# Patient Record
Sex: FEMALE | Race: AMERICAN INDIAN OR ALASKA NATIVE | Employment: UNEMPLOYED | URBAN - METROPOLITAN AREA
[De-identification: names, ages, dates, MRNs, and addresses within clinical notes are randomized per-mention and may not be internally consistent; named-entity substitution may affect disease eponyms.]

---

## 2017-05-23 ENCOUNTER — TRANSFERRED RECORDS (OUTPATIENT)
Dept: HEALTH INFORMATION MANAGEMENT | Facility: CLINIC | Age: 62
End: 2017-05-23

## 2017-05-23 ENCOUNTER — MEDICAL CORRESPONDENCE (OUTPATIENT)
Dept: HEALTH INFORMATION MANAGEMENT | Facility: CLINIC | Age: 62
End: 2017-05-23

## 2017-05-23 LAB
HBA1C MFR BLD: 5.9 % (ref 0–5.7)
HEP C HIM: NORMAL
PHQ9 SCORE: 3

## 2017-05-24 ENCOUNTER — TRANSFERRED RECORDS (OUTPATIENT)
Dept: HEALTH INFORMATION MANAGEMENT | Facility: CLINIC | Age: 62
End: 2017-05-24

## 2017-06-01 ENCOUNTER — TRANSFERRED RECORDS (OUTPATIENT)
Dept: HEALTH INFORMATION MANAGEMENT | Facility: CLINIC | Age: 62
End: 2017-06-01

## 2017-06-01 LAB — PAP SMEAR - HIM PATIENT REPORTED: NEGATIVE

## 2017-06-06 ENCOUNTER — TRANSFERRED RECORDS (OUTPATIENT)
Dept: HEALTH INFORMATION MANAGEMENT | Facility: CLINIC | Age: 62
End: 2017-06-06

## 2017-06-06 LAB
ALT SERPL-CCNC: 44 U/L (ref 6–29)
AST SERPL-CCNC: 21 U/L (ref 10–35)
CREAT SERPL-MCNC: 0.7 MG/DL (ref 0.5–0.99)
GFR SERPL CREATININE-BSD FRML MDRD: 93 ML/MIN/1.73M2
GLUCOSE SERPL-MCNC: 90 MG/DL (ref 65–99)
POTASSIUM SERPL-SCNC: 4.4 MMOL/L (ref 3.5–5.3)

## 2017-06-07 ENCOUNTER — TRANSFERRED RECORDS (OUTPATIENT)
Dept: HEALTH INFORMATION MANAGEMENT | Facility: CLINIC | Age: 62
End: 2017-06-07

## 2017-06-07 ENCOUNTER — PRE VISIT (OUTPATIENT)
Dept: RHEUMATOLOGY | Facility: CLINIC | Age: 62
End: 2017-06-07

## 2017-06-07 LAB — HBA1C MFR BLD: 5.8 % (ref 0–5.7)

## 2017-06-08 RX ORDER — ACETAMINOPHEN 500 MG
500-1000 TABLET ORAL EVERY 6 HOURS PRN
COMMUNITY
End: 2017-08-31

## 2017-06-08 RX ORDER — AZITHROMYCIN 250 MG/1
250 TABLET, FILM COATED ORAL
COMMUNITY
End: 2017-06-08

## 2017-06-08 RX ORDER — METFORMIN HCL 500 MG
500 TABLET, EXTENDED RELEASE 24 HR ORAL 2 TIMES DAILY WITH MEALS
COMMUNITY

## 2017-06-08 RX ORDER — IBUPROFEN 800 MG/1
800 TABLET, FILM COATED ORAL EVERY 8 HOURS PRN
COMMUNITY

## 2017-06-08 RX ORDER — GABAPENTIN 100 MG/1
100 CAPSULE ORAL 2 TIMES DAILY PRN
COMMUNITY

## 2017-06-08 RX ORDER — PREDNISONE 5 MG/1
10 TABLET ORAL SEE ADMIN INSTRUCTIONS
COMMUNITY
End: 2017-06-08

## 2017-06-08 NOTE — TELEPHONE ENCOUNTER
PREVISIT INFORMATION                                                    Lesly Campbell scheduled for future visit at [facility] specialty clinics.    Patient is scheduled to see Dr. Meza on 6/14/2017  Reason for visit: RA  Referring provider Janessa Farias   Has patient seen previous specialist? Yes.  [facility]  Over ten years ago cant remember providers name    Medical Records:  Patient signed to have records sent to us     REVIEW                                                      New patient packet mailed to patient: N/A  Medication reconciliation complete: Yes      Current Outpatient Prescriptions   Medication Sig Dispense Refill     acetaminophen (TYLENOL) 500 MG tablet Take 500-1,000 mg by mouth every 6 hours as needed for mild pain       ibuprofen (ADVIL/MOTRIN) 800 MG tablet Take 800 mg by mouth every 8 hours as needed for moderate pain       sodium chloride (OCEAN) 0.65 % nasal spray Spray 2 sprays in nostril daily as needed for congestion       metFORMIN (GLUCOPHAGE-XR) 500 MG 24 hr tablet Take 500 mg by mouth daily (with breakfast)       gabapentin (NEURONTIN) 100 MG capsule Take 100 mg by mouth 2 times daily as needed       ONDANSETRON PO Take 8 mg by mouth every 8 hours as needed for nausea       OMEPRAZOLE PO Take 20 mg by mouth every morning       Methotrexate Sodium (METHOTREXATE PO) Take 2.5 mg by mouth once a week       FOLIC ACID PO Take 1 mg by mouth daily         Allergies: Sulfa drugs        PLAN/FOLLOW-UP NEEDED                                                      Previsit review complete.  Patient will see provider at future scheduled appointment.     Patient Reminders Given:  Please, make sure you bring an updated list of your medications.   If you are having a procedure, please, present 15 minutes early.  If you need to cancel or reschedule,please call [phone].    Sancho Jackman

## 2017-06-08 NOTE — TELEPHONE ENCOUNTER
Patient plans to hold new methotrexate medication until evaluation with Dr. Meza.    Lizet Adame, RYANN, RN, PHN  Rheumatology Care Coordinator  [facility]

## 2017-06-14 ENCOUNTER — OFFICE VISIT (OUTPATIENT)
Dept: RHEUMATOLOGY | Facility: CLINIC | Age: 62
End: 2017-06-14
Payer: MEDICAID

## 2017-06-14 ENCOUNTER — RADIANT APPOINTMENT (OUTPATIENT)
Dept: GENERAL RADIOLOGY | Facility: CLINIC | Age: 62
End: 2017-06-14
Attending: STUDENT IN AN ORGANIZED HEALTH CARE EDUCATION/TRAINING PROGRAM
Payer: MEDICAID

## 2017-06-14 VITALS
WEIGHT: 156.2 LBS | TEMPERATURE: 98.2 F | DIASTOLIC BLOOD PRESSURE: 74 MMHG | SYSTOLIC BLOOD PRESSURE: 109 MMHG | HEART RATE: 71 BPM

## 2017-06-14 DIAGNOSIS — R53.82 CHRONIC FATIGUE: ICD-10-CM

## 2017-06-14 DIAGNOSIS — M05.79 RHEUMATOID ARTHRITIS INVOLVING MULTIPLE SITES WITH POSITIVE RHEUMATOID FACTOR (H): Primary | ICD-10-CM

## 2017-06-14 DIAGNOSIS — M05.79 RHEUMATOID ARTHRITIS INVOLVING MULTIPLE SITES WITH POSITIVE RHEUMATOID FACTOR (H): ICD-10-CM

## 2017-06-14 LAB
ALBUMIN SERPL-MCNC: 3.6 G/DL (ref 3.4–5)
ALP SERPL-CCNC: 69 U/L (ref 40–150)
ALT SERPL W P-5'-P-CCNC: 63 U/L (ref 0–50)
AST SERPL W P-5'-P-CCNC: 45 U/L (ref 0–45)
BASOPHILS # BLD AUTO: 0.1 10E9/L (ref 0–0.2)
BASOPHILS NFR BLD AUTO: 0.8 %
CRP SERPL-MCNC: 9.7 MG/L (ref 0–8)
DIFFERENTIAL METHOD BLD: NORMAL
EOSINOPHIL # BLD AUTO: 0.4 10E9/L (ref 0–0.7)
EOSINOPHIL NFR BLD AUTO: 5.2 %
ERYTHROCYTE [DISTWIDTH] IN BLOOD BY AUTOMATED COUNT: 14.7 % (ref 10–15)
ERYTHROCYTE [SEDIMENTATION RATE] IN BLOOD BY WESTERGREN METHOD: 29 MM/H (ref 0–30)
HCT VFR BLD AUTO: 40.3 % (ref 35–47)
HGB BLD-MCNC: 13.2 G/DL (ref 11.7–15.7)
LYMPHOCYTES # BLD AUTO: 2 10E9/L (ref 0.8–5.3)
LYMPHOCYTES NFR BLD AUTO: 28.2 %
MCH RBC QN AUTO: 30.2 PG (ref 26.5–33)
MCHC RBC AUTO-ENTMCNC: 32.8 G/DL (ref 31.5–36.5)
MCV RBC AUTO: 92 FL (ref 78–100)
MONOCYTES # BLD AUTO: 0.5 10E9/L (ref 0–1.3)
MONOCYTES NFR BLD AUTO: 6.4 %
NEUTROPHILS # BLD AUTO: 4.2 10E9/L (ref 1.6–8.3)
NEUTROPHILS NFR BLD AUTO: 59.4 %
PLATELET # BLD AUTO: 283 10E9/L (ref 150–450)
RBC # BLD AUTO: 4.37 10E12/L (ref 3.8–5.2)
TSH SERPL DL<=0.005 MIU/L-ACNC: 0.98 MU/L (ref 0.4–4)
WBC # BLD AUTO: 7.1 10E9/L (ref 4–11)

## 2017-06-14 PROCEDURE — 82306 VITAMIN D 25 HYDROXY: CPT | Performed by: STUDENT IN AN ORGANIZED HEALTH CARE EDUCATION/TRAINING PROGRAM

## 2017-06-14 PROCEDURE — 82040 ASSAY OF SERUM ALBUMIN: CPT | Performed by: STUDENT IN AN ORGANIZED HEALTH CARE EDUCATION/TRAINING PROGRAM

## 2017-06-14 PROCEDURE — 73130 X-RAY EXAM OF HAND: CPT | Mod: LT | Performed by: RADIOLOGY

## 2017-06-14 PROCEDURE — 86200 CCP ANTIBODY: CPT | Performed by: STUDENT IN AN ORGANIZED HEALTH CARE EDUCATION/TRAINING PROGRAM

## 2017-06-14 PROCEDURE — 87340 HEPATITIS B SURFACE AG IA: CPT | Performed by: STUDENT IN AN ORGANIZED HEALTH CARE EDUCATION/TRAINING PROGRAM

## 2017-06-14 PROCEDURE — 84460 ALANINE AMINO (ALT) (SGPT): CPT | Performed by: STUDENT IN AN ORGANIZED HEALTH CARE EDUCATION/TRAINING PROGRAM

## 2017-06-14 PROCEDURE — 84443 ASSAY THYROID STIM HORMONE: CPT | Performed by: STUDENT IN AN ORGANIZED HEALTH CARE EDUCATION/TRAINING PROGRAM

## 2017-06-14 PROCEDURE — 86140 C-REACTIVE PROTEIN: CPT | Performed by: STUDENT IN AN ORGANIZED HEALTH CARE EDUCATION/TRAINING PROGRAM

## 2017-06-14 PROCEDURE — 99204 OFFICE O/P NEW MOD 45 MIN: CPT | Performed by: STUDENT IN AN ORGANIZED HEALTH CARE EDUCATION/TRAINING PROGRAM

## 2017-06-14 PROCEDURE — 36415 COLL VENOUS BLD VENIPUNCTURE: CPT | Performed by: STUDENT IN AN ORGANIZED HEALTH CARE EDUCATION/TRAINING PROGRAM

## 2017-06-14 PROCEDURE — 86704 HEP B CORE ANTIBODY TOTAL: CPT | Performed by: STUDENT IN AN ORGANIZED HEALTH CARE EDUCATION/TRAINING PROGRAM

## 2017-06-14 PROCEDURE — 85025 COMPLETE CBC W/AUTO DIFF WBC: CPT | Performed by: STUDENT IN AN ORGANIZED HEALTH CARE EDUCATION/TRAINING PROGRAM

## 2017-06-14 PROCEDURE — 85652 RBC SED RATE AUTOMATED: CPT | Performed by: STUDENT IN AN ORGANIZED HEALTH CARE EDUCATION/TRAINING PROGRAM

## 2017-06-14 PROCEDURE — 86480 TB TEST CELL IMMUN MEASURE: CPT | Performed by: STUDENT IN AN ORGANIZED HEALTH CARE EDUCATION/TRAINING PROGRAM

## 2017-06-14 PROCEDURE — 84450 TRANSFERASE (AST) (SGOT): CPT | Performed by: STUDENT IN AN ORGANIZED HEALTH CARE EDUCATION/TRAINING PROGRAM

## 2017-06-14 PROCEDURE — 84075 ASSAY ALKALINE PHOSPHATASE: CPT | Performed by: STUDENT IN AN ORGANIZED HEALTH CARE EDUCATION/TRAINING PROGRAM

## 2017-06-14 RX ORDER — PREDNISONE 5 MG/1
5 TABLET ORAL DAILY
Qty: 90 TABLET | Refills: 0 | Status: SHIPPED | OUTPATIENT
Start: 2017-06-14 | End: 2017-08-31

## 2017-06-14 RX ORDER — AZITHROMYCIN 250 MG/1
TABLET, FILM COATED ORAL
COMMUNITY
Start: 2017-05-23 | End: 2017-08-31

## 2017-06-14 ASSESSMENT — PAIN SCALES - GENERAL: PAINLEVEL: MODERATE PAIN (4)

## 2017-06-14 NOTE — PROGRESS NOTES
HISTORY OF PRESENT ILLNESS:  Lesly Campbell is a 61-year-old female with a past medical history of rheumatoid arthritis, newly diagnosed diabetes, neuropathy seen in the clinic at consultation request of primary care Janessa Farias for evaluation and treatment of rheumatoid arthritis.  Ms. Campbell mentions that she was diagnosed with rheumatoid arthritis in 2001.  She was followed by Dr. Jimenez in [facility] and later by Dr. Meade at [facility].  She has taken methotrexate 7 tablets in the past.  She did mention about taking self injectables as well as infusions but could not recall the name of her medications.  Due to loss of insurance she could not take these medications and could not follow up with her rheumatologist.  Since 2010, she has not taken her methotrexate.  She follows up with a primary care at [facility] and her pain is managed with Tylenol 1000 mg and ibuprofen 800 mg on as needed basis.  According to her, she was doing fine since 2010, had no flareups but starting in 05/2017 she started complaining more pain in her hands and feet along with morning stiffness.  She also feels more fatigued and tired.  Her primary care did some labs including CBC, liver, kidney function and rheumatoid factor.  Her rheumatoid factor came back high at 54.  SILVINA was negative.  She had mild increase in ALT to 41.  Otherwise, had normal CRP and CBC.  Along with some mild stiffness and pain in her hands she also complains of a significant burning pain in her fingertips.  She was prescribed metformin and gabapentin by her primary care, but has not started taking these medications yet.  She was given a prednisone taper starting with 60 mg and tapering by 10 mg every 2 weeks to off.  She just finished her prednisone taper a few days ago.  Prednisone did help with her joint pains but it did not help with the numbness and tingling.  Plus, she did not like the side effects of prednisone.  She felt anxious and had  sleep disturbances on it.      She also had a recent strep throat and a fever.  She is taking azithromycin and is feeling better now.  Otherwise, she denies any loss of weight, fever, chills or night sweats.  No new skin rash, pleuritic chest pain, sicca symptoms, or Raynaud's.  No history of any arterial or venous blood clot.  She does mention about not having good night's sleep.  She wakes up in the middle of night.  She does snore at night but has never had any formal sleep evaluation done.      PAST MEDICAL HISTORY:  Rheumatoid arthritis, as described in HPI.  She has a history of abnormal LFTs in the past as well.      PAST SURGICAL HISTORY:  None.        FAMILY HISTORY:  She has a history of liver cancer and cirrhosis in her mother.  There is no history of any rheumatoid arthritis or autoimmune disease in the family.      ALLERGIES:  Sulfa drugs.      SOCIAL HISTORY:  She denies any smoking, drug abuse or alcohol abuse.      REVIEW OF SYMPTOMS:   CONSTITUTIONAL:  Denies any fever, chills, night sweats or fatigue.   SKIN:  No skin rash.   EYES:  No dryness or irritation.   ENT:  No recurrent sinus infections.  She just had an upper respiratory infection for which she is taking azithromycin.   RESPIRATORY:  No shortness of breath, dyspnea, cough or hemoptysis.   CARDIOVASCULAR:  No chest pain or palpitations.   GASTROINTESTINAL:  No nausea, vomiting, abdominal pain.   GENITOURINARY:  No dysuria.     NEUROLOGIC:  She does complain of burning sensation in her fingertips along with numbness and tingling.  Some numbness in her toes.   PSYCHIATRIC:  No mood disorder.  She does feel depressed and anxious at some times.   HEMATOLOGIC:  No history of easy bruising or petechia or purpura.  No abnormal bleeding.   ENDOCRINE:  No history of thyroid disease.  Newly diagnosed diabetes.      PHYSICAL EXAMINATION:   VITAL SIGNS:  Blood pressure of 109/74, pulse is 71, weight is 70.9 kilograms.   CONSTITUTIONAL:  She is  well-developed, appearing stated age and cooperative.   HEENT:  Normal extraocular movements.  Pupils equally round and reactive to light.  Conjunctivae and sclerae normal.  ENT normal external ears, nose, hearing, lips, teeth, gums, no mucous membrane lesions.  Normal saliva pool.    NECK:  No mass, thyroid enlargement.   RESPIRATORY:  Lungs are clear.   CARDIOVASCULAR:  Regular rate and rhythm.  No murmurs, rubs or gallops.   GASTROINTESTINAL:  No abdominal mass or tenderness.   GENITOURINARY:  Not tested.   LYMPH:  No cervical or supraclavicular, inguinal or epitrochlear nodes.   MUSCULOSKELETAL:  TMJ, neck, shoulder, elbow, wrist, MCP, PIP, DIP, spine, hip, knee, ankle and foot were examined.   She has no tenderness on exam over the PIP, DIP and MTP joints.  No synovitis noted.  She has mild tenderness over the first CMC joints bilaterally.  There is no wrist tenderness or synovitis on exam.  No tenderness over the elbows.  Normal range of motion.  She has no pain over the shoulders and normal shoulder movement.  She has no tenderness or knee effusions.  She has no tenderness on exam over the bilateral ankles.  No dactylitis tenosynovitis or enthesopathy.   SKIN:  There is no nail pitting, alopecia, rash, nodules or lesions.   NEUROLOGIC:  Normal cranial nerves, strength and sensation.   PSYCHIATRIC:  Normal judgment, orientation, memory and affect.      LABORATORY DATA:  Recent labs from primary care office done on 06/06/2017 showed creatinine of 0.7, BUN 15, AST 21, ALT raised to 44.  Lyme screen done, not back yet.  Rheumatoid factor high 54.  CRP was normal 0.13, SILVINA negative.  Hepatitis C is negative.  CBC in 05/2017 which showed normal hemoglobin.  No leukopenia.  Past labs in 2010, she had a CCP antibody which was high at 34.  rheumatoid factor was 16.      ASSESSMENT:   1.  Seropositive rheumatoid arthritis, positive RF, positive CCP.   2.  Elevated ALT.  History of abnormal liver enzymes in the past.    3.  Fatigue and morning stiffness.     4.  Numbness and tingling in the fingertips.   5.  Diabetes and neuropathy.      Ms. Campbell is a 61-year-old female with history of seropositive rheumatoid arthritis treated with methotrexate and intermittent prednisone.  She gives history of use of self injectable medication and infusions for her arthritis but could not recall the name of medication.  At present she complains of a flare of her arthritis.  She just finished a prednisone taper starting at 60 mg, which did make her feel a little better.  Her main complaint though today was the numbness and burning in her fingertips rather than significant joint pains.  She was instructed to start the metformin and gabapentin for diabetes and neuropathy, which she has not started yet.  Reviewing her past records and her examination shows that the reason for her joint pain can be related to underlying rheumatoid arthritis as well as the diabetic neuropathy.  She also feels fatigued, which may be related to underlying arthritis as well as disturbed sleep.        Methotrexate will be a good medication for her for the underlying arthritis, but she had recent LFTs which showed elevated ALT.  Would like to repeat her LFTs before prescribing methotrexate.  She can take some low-dose prednisone for symptom relief.  We will also do some blood tests including her inflammatory markers, hepatitis B surface antigen and core antibody and will repeat her CBC and liver function tests.  We will get x-ray of her bilateral hands to look for any evidence of erosive disease.  I also recommended her to avoid Tylenol for now.  Her elevated ALT might be related to overuse of Tylenol for pain control.      She has chronic fatigue problems.  I would like to check her thyroid profile and vitamin D level.  She also has disturbed sleep and snores at night.  It will be beneficial to get a formal sleep evaluation for possible sleep apnea.      PLAN:     1.   Blood tests.  We will do ESR, CRP, hepatitis B surface antigen, hepatitis B core antibody, CBC, liver and kidney tests.     2.  We will get x-ray of her bilateral hands as a baseline.     3.  We will also screen for TB with QuantiFERON Gold in case we like to start her on any biological therapy.    4.  For immediate symptom relief I will prescribe her low-dose prednisone 10 mg once a day.   5.  I recommended her to start treatment for diabetes and underlying neuropathy with metformin and gabapentin as prescribed by her primary.   6.  Follow up in 4 weeks.  We will discuss her rheumatoid arthritis treatment plan.         KAEL WALKER MD             D: 2017 12:38   T: 2017 14:00   MT:       Name:     FREDY BOYLE   MRN:      [id number]        Account:      [id number]   :      1955           Visit Date:   2017      Document: [id number]       cc: Janessa Farias NP

## 2017-06-14 NOTE — LETTER
6/14/2017      RE: Lesly Campbell  [address]  [place] [address]     Dear Colleague,    Thank you for referring your patient, Lesly Campbell, to the [facility]. Please see a copy of my visit note below.    HISTORY OF PRESENT ILLNESS:  Lesly Campbell is a 61-year-old female with a past medical history of rheumatoid arthritis, newly diagnosed diabetes, neuropathy seen in the clinic at consultation request of primary care Janessa Farias for evaluation and treatment of rheumatoid arthritis.  Ms. Campbell mentions that she was diagnosed with rheumatoid arthritis in 2001.  She was followed by Dr. Jimenez in [facility] and later by Dr. Meade at [facility].  She has taken methotrexate 7 tablets in the past.  She did mention about taking self injectables as well as infusions but could not recall the name of her medications.  Due to loss of insurance she could not take these medications and could not follow up with her rheumatologist.  Since 2010, she has not taken her methotrexate.  She follows up with a primary care at [facility] and her pain is managed with Tylenol 1000 mg and ibuprofen 800 mg on as needed basis.  According to her, she was doing fine since 2010, had no flareups but starting in 05/2017 she started complaining more pain in her hands and feet along with morning stiffness.  She also feels more fatigued and tired.  Her primary care did some labs including CBC, liver, kidney function and rheumatoid factor.  Her rheumatoid factor came back high at 54.  SILVINA was negative.  She had mild increase in ALT to 41.  Otherwise, had normal CRP and CBC.  Along with some mild stiffness and pain in her hands she also complains of a significant burning pain in her fingertips.  She was prescribed metformin and gabapentin by her primary care, but has not started taking these medications yet.  She was given a prednisone taper starting with 60 mg and tapering by 10 mg every 2  weeks to off.  She just finished her prednisone taper a few days ago.  Prednisone did help with her joint pains but it did not help with the numbness and tingling.  Plus, she did not like the side effects of prednisone.  She felt anxious and had sleep disturbances on it.      She also had a recent strep throat and a fever.  She is taking azithromycin and is feeling better now.  Otherwise, she denies any loss of weight, fever, chills or night sweats.  No new skin rash, pleuritic chest pain, sicca symptoms, or Raynaud's.  No history of any arterial or venous blood clot.  She does mention about not having good night's sleep.  She wakes up in the middle of night.  She does snore at night but has never had any formal sleep evaluation done.      PAST MEDICAL HISTORY:  Rheumatoid arthritis, as described in HPI.  She has a history of abnormal LFTs in the past as well.      PAST SURGICAL HISTORY:  None.        FAMILY HISTORY:  She has a history of liver cancer and cirrhosis in her mother.  There is no history of any rheumatoid arthritis or autoimmune disease in the family.      ALLERGIES:  Sulfa drugs.      SOCIAL HISTORY:  She denies any smoking, drug abuse or alcohol abuse.      REVIEW OF SYMPTOMS:   CONSTITUTIONAL:  Denies any fever, chills, night sweats or fatigue.   SKIN:  No skin rash.   EYES:  No dryness or irritation.   ENT:  No recurrent sinus infections.  She just had an upper respiratory infection for which she is taking azithromycin.   RESPIRATORY:  No shortness of breath, dyspnea, cough or hemoptysis.   CARDIOVASCULAR:  No chest pain or palpitations.   GASTROINTESTINAL:  No nausea, vomiting, abdominal pain.   GENITOURINARY:  No dysuria.     NEUROLOGIC:  She does complain of burning sensation in her fingertips along with numbness and tingling.  Some numbness in her toes.   PSYCHIATRIC:  No mood disorder.  She does feel depressed and anxious at some times.   HEMATOLOGIC:  No history of easy bruising or petechia or  purpura.  No abnormal bleeding.   ENDOCRINE:  No history of thyroid disease.  Newly diagnosed diabetes.      PHYSICAL EXAMINATION:   VITAL SIGNS:  Blood pressure of 109/74, pulse is 71, weight is 70.9 kilograms.   CONSTITUTIONAL:  She is well-developed, appearing stated age and cooperative.   HEENT:  Normal extraocular movements.  Pupils equally round and reactive to light.  Conjunctivae and sclerae normal.  ENT normal external ears, nose, hearing, lips, teeth, gums, no mucous membrane lesions.  Normal saliva pool.    NECK:  No mass, thyroid enlargement.   RESPIRATORY:  Lungs are clear.   CARDIOVASCULAR:  Regular rate and rhythm.  No murmurs, rubs or gallops.   GASTROINTESTINAL:  No abdominal mass or tenderness.   GENITOURINARY:  Not tested.   LYMPH:  No cervical or supraclavicular, inguinal or epitrochlear nodes.   MUSCULOSKELETAL:  TMJ, neck, shoulder, elbow, wrist, MCP, PIP, DIP, spine, hip, knee, ankle and foot were examined.   She has no tenderness on exam over the PIP, DIP and MTP joints.  No synovitis noted.  She has mild tenderness over the first CMC joints bilaterally.  There is no wrist tenderness or synovitis on exam.  No tenderness over the elbows.  Normal range of motion.  She has no pain over the shoulders and normal shoulder movement.  She has no tenderness or knee effusions.  She has no tenderness on exam over the bilateral ankles.  No dactylitis tenosynovitis or enthesopathy.   SKIN:  There is no nail pitting, alopecia, rash, nodules or lesions.   NEUROLOGIC:  Normal cranial nerves, strength and sensation.   PSYCHIATRIC:  Normal judgment, orientation, memory and affect.      LABORATORY DATA:  Recent labs from primary care office done on 06/06/2017 showed creatinine of 0.7, BUN 15, AST 21, ALT raised to 44.  Lyme screen done, not back yet.  Rheumatoid factor high 54.  CRP was normal 0.13, SILVINA negative.  Hepatitis C is negative.  CBC in 05/2017 which showed normal hemoglobin.  No leukopenia.  Past  labs in 2010, she had a CCP antibody which was high at 34.  rheumatoid factor was 16.      ASSESSMENT:   1.  Seropositive rheumatoid arthritis, positive RF, positive CCP.   2.  Elevated ALT.  History of abnormal liver enzymes in the past.   3.  Fatigue and morning stiffness.     4.  Numbness and tingling in the fingertips.   5.  Diabetes and neuropathy.      Ms. Campbell is a 61-year-old female with history of seropositive rheumatoid arthritis treated with methotrexate and intermittent prednisone.  She gives history of use of self injectable medication and infusions for her arthritis but could not recall the name of medication.  At present she complains of a flare of her arthritis.  She just finished a prednisone taper starting at 60 mg, which did make her feel a little better.  Her main complaint though today was the numbness and burning in her fingertips rather than significant joint pains.  She was instructed to start the metformin and gabapentin for diabetes and neuropathy, which she has not started yet.  Reviewing her past records and her examination shows that the reason for her joint pain can be related to underlying rheumatoid arthritis as well as the diabetic neuropathy.  She also feels fatigued, which may be related to underlying arthritis as well as disturbed sleep.        Methotrexate will be a good medication for her for the underlying arthritis, but she had recent LFTs which showed elevated ALT.  Would like to repeat her LFTs before prescribing methotrexate.  She can take some low-dose prednisone for symptom relief.  We will also do some blood tests including her inflammatory markers, hepatitis B surface antigen and core antibody and will repeat her CBC and liver function tests.  We will get x-ray of her bilateral hands to look for any evidence of erosive disease.  I also recommended her to avoid Tylenol for now.  Her elevated ALT might be related to overuse of Tylenol for pain control.      She has  chronic fatigue problems.  I would like to check her thyroid profile and vitamin D level.  She also has disturbed sleep and snores at night.  It will be beneficial to get a formal sleep evaluation for possible sleep apnea.      PLAN:     1.  Blood tests.  We will do ESR, CRP, hepatitis B surface antigen, hepatitis B core antibody, CBC, liver and kidney tests.     2.  We will get x-ray of her bilateral hands as a baseline.     3.  We will also screen for TB with QuantiFERON Gold in case we like to start her on any biological therapy.    4.  For immediate symptom relief I will prescribe her low-dose prednisone 10 mg once a day.   5.  I recommended her to start treatment for diabetes and underlying neuropathy with metformin and gabapentin as prescribed by her primary.   6.  Follow up in 4 weeks.  We will discuss her rheumatoid arthritis treatment plan.         KAEL MEZA MD             D: 2017 12:38   T: 2017 14:00   MT:       Name:     FREDY BOYLE   MRN:      [id number]        Account:      [id number]   :      1955           Visit Date:   2017      Document: [id number]       cc: Janessa Farias NP       Again, thank you for allowing me to participate in the care of your patient.      Sincerely,    Kael Meza MD

## 2017-06-14 NOTE — LETTER
Lesly Campbell  [address] [address]        June 14, 2017          Dear ,    We are writing to inform you of your test results.    {results letter list:[id number]}    No results found from the In Basket message.    Thank you very much for choosing [facility]. Please call my office if you have any questions or concerns.       Sincerely,    Aida Meza MD

## 2017-06-14 NOTE — PATIENT INSTRUCTIONS
-- Will do blood work today  -- X-rays of your hands   -- Liver function was abnormal before. Will recheck before prescribing MTX   -- Burning and numbness in your hands may be related to diabetes. So you can start treatment of Diabetes and numbness as prescribed by your primary   -- [facility]   -- Follow up in 4 weeks   -- Prednisone 10 mg once a day. Try to avoid tylenol.

## 2017-06-14 NOTE — NURSING NOTE
Lesly Campbell's goals for this visit include:   She requests these members of her care team be copied on today's visit information:     PCP: Janessa Farias    Referring Provider:  Janessa Farias NP  [address] [place] [address]    Chief Complaint   Patient presents with     Consult     Rhem Arthritis       Initial /74 (BP Location: Left arm, Patient Position: Chair)  Pulse 71  Temp 98.2  F (36.8  C)  Wt 70.9 kg (156 lb 3.2 oz) There is no height or weight on file to calculate BMI.  Medication Reconciliation: complete

## 2017-06-14 NOTE — MR AVS SNAPSHOT
After Visit Summary   6/14/2017    Lesly Campbell    MRN: [id number]           Patient Information     Date Of Birth          1955        Visit Information        Provider Department      6/14/2017 11:00 AM Aida Meza MD [facility]        Today's Diagnoses     Rheumatoid arthritis involving multiple sites with positive rheumatoid factor (H)    -  1    Chronic fatigue          Care Instructions    -- Will do blood work today  -- X-rays of your hands   -- Liver function was abnormal before. Will recheck before prescribing MTX   -- Burning and numbness in your hands may be related to diabetes. So you can start treatment of Diabetes and numbness as prescribed by your primary   -- [facility]   -- Follow up in 4 weeks   -- Prednisone 10 mg once a day. Try to avoid tylenol.           Follow-ups after your visit        Follow-up notes from your care team     Return in about 4 weeks (around 7/12/2017).      Your next 10 appointments already scheduled     Jul 14, 2017 11:00 AM CDT   Return Visit with Aida Meza MD   [facility] ([facility])    [address] [place] [id number] [phone]              Future tests that were ordered for you today     Open Future Orders        Priority Expected Expires Ordered    TSH with free T4 reflex Routine  6/14/2018 6/14/2017    Vitamin D Deficiency Routine  6/14/2018 6/14/2017    ESR Routine  6/14/2018 6/14/2017    ALT Routine  6/14/2018 6/14/2017    AST Routine  6/14/2018 6/14/2017    Albumin level Routine  6/14/2018 6/14/2017    Alkaline phosphatase Routine  6/14/2018 6/14/2017    CRP inflammation Routine  6/14/2018 6/14/2017    Cyclic Citrullinated Peptide Antibody IgG Routine  6/14/2018 6/14/2017    Hepatitis B surface antigen Routine  6/14/2018 6/14/2017    Hepatitis B core antibody Routine  6/14/2018 6/14/2017    CBC with platelets differential Routine   2018    X-ray bl hand 3+ vw Routine 2017 1/10/2018 2017            Who to contact     If you have questions or need follow up information about today's clinic visit or your schedule please contact [facility] directly at [phone].  Normal or non-critical lab and imaging results will be communicated to you by MyChart, letter or phone within 4 business days after the clinic has received the results. If you do not hear from us within 7 days, please contact the clinic through [email or web address] or phone. If you have a critical or abnormal lab result, we will notify you by phone as soon as possible.  Submit refill requests through Access Information Management or call your pharmacy and they will forward the refill request to us. Please allow 3 business days for your refill to be completed.          Additional Information About Your Visit        Access Information Management Information     Access Information Management is an electronic gateway that provides easy, online access to your medical records. With Access Information Management, you can request a clinic appointment, read your test results, renew a prescription or communicate with your care team.     To sign up for Access Information Management visit the website at [email or web address]   You will be asked to enter the access code listed below, as well as some personal information. Please follow the directions to create your username and password.     Your access code is: [email or web address]  Expires: 2017 12:08 PM     Your access code will  in 90 days. If you need help or a new code, please contact your [facility] or call [phone] for assistance.        Care EveryWhere ID     This is your Care EveryWhere ID. This could be used by other organizations to access your [place] medical records  [id number]        Your Vitals Were     Pulse Temperature                71 98.2  F (36.8  C)           Blood Pressure from Last 3 Encounters:   17 109/74    Weight from Last 3 Encounters:    06/14/17 70.9 kg (156 lb 3.2 oz)                 Today's Medication Changes          These changes are accurate as of: 6/14/17 12:08 PM.  If you have any questions, ask your nurse or doctor.               Start taking these medicines.        Dose/Directions    predniSONE 5 MG tablet   Commonly known as:  DELTASONE   Used for:  Rheumatoid arthritis involving multiple sites with positive rheumatoid factor (H), Chronic fatigue   Started by:  Aida Meza MD        Dose:  5 mg   Take 1 tablet (5 mg) by mouth daily   Quantity:  90 tablet   Refills:  0            Where to get your medicines      These medications were sent to [facility] - [place], [place] - [address], [address]  [address], [address], [facility] [address]     Phone:  [phone]     predniSONE 5 MG tablet                Primary Care Provider Office Phone # Fax #    Janessa SHEN Farias [phone]        [facility] [address] [facility] [address]        Thank you!     Thank you for choosing [facility]  for your care. Our goal is always to provide you with excellent care. Hearing back from our patients is one way we can continue to improve our services. Please take a few minutes to complete the written survey that you may receive in the mail after your visit with us. Thank you!             Your Updated Medication List - Protect others around you: Learn how to safely use, store and throw away your medicines at www.disposemymeds.org.          This list is accurate as of: 6/14/17 12:08 PM.  Always use your most recent med list.                   Brand Name Dispense Instructions for use    acetaminophen 500 MG tablet    TYLENOL     Take 500-1,000 mg by mouth every 6 hours as needed for mild pain       azithromycin 250 MG tablet    ZITHROMAX         FOLIC ACID PO      Take 1 mg by mouth daily       gabapentin 100 MG capsule    NEURONTIN     Take 100 mg by mouth 2  times daily as needed       ibuprofen 800 MG tablet    ADVIL/MOTRIN     Take 800 mg by mouth every 8 hours as needed for moderate pain       metFORMIN 500 MG 24 hr tablet    GLUCOPHAGE-XR     Take 500 mg by mouth daily (with breakfast)       METHOTREXATE PO      Take 2.5 mg by mouth once a week       OMEPRAZOLE PO      Take 20 mg by mouth every morning       ONDANSETRON PO      Take 8 mg by mouth every 8 hours as needed for nausea       predniSONE 5 MG tablet    DELTASONE    90 tablet    Take 1 tablet (5 mg) by mouth daily       sodium chloride 0.65 % nasal spray    OCEAN     Spray 2 sprays in nostril daily as needed for congestion

## 2017-06-15 ENCOUNTER — TELEPHONE (OUTPATIENT)
Dept: NURSING | Facility: CLINIC | Age: 62
End: 2017-06-15

## 2017-06-15 LAB
CCP AB SER IA-ACNC: 18 U/ML
DEPRECATED CALCIDIOL+CALCIFEROL SERPL-MC: 24 UG/L (ref 20–75)
HBV CORE AB SERPL QL IA: NONREACTIVE
HBV SURFACE AG SERPL QL IA: NONREACTIVE

## 2017-06-15 NOTE — TELEPHONE ENCOUNTER
Call placed to patient to review recent lab results 6/14/2017, reviewing the positivie test for Rheumatoid Arthritis, along with abnormal ALT liver test.  Reviewed with patient the recommendations of Dr. Meza to stop taking tylenol.  Reviewed that at this time, methotrexate will not be started due to the abnormal liver function  This writer reviewed the importance of continuing the prednisone as ordered.  Encouraged patient to call if pain continues or worsens, as Dr. Meza can increase the dose.      Assisted patient with scheduling follow up appointment for July 3rd, 2017.      Aida Meza MD  P Mg  Rheum Pt Care                   [facility],     You have positive tests for Rheumatoid arthritis. Your Xrays are normal , no signs of bone damage. Your liver function tests is abnormal so do not want to start Methotrexate at present. Stop taking Tylenol. Prednisone should be able to control the symptoms. If you still have pain we can increase the dose. Let us know. Will repeat the liver tests on your subsequent visit.       Team : can you Prepone her appointment to first week of July.

## 2017-06-16 LAB
M TB TUBERC IFN-G BLD QL: NEGATIVE
M TB TUBERC IFN-G/MITOGEN IGNF BLD: 0 IU/ML

## 2017-06-22 ENCOUNTER — TELEPHONE (OUTPATIENT)
Dept: RHEUMATOLOGY | Facility: CLINIC | Age: 62
End: 2017-06-22

## 2017-06-22 DIAGNOSIS — R73.03 PRE-DIABETES: ICD-10-CM

## 2017-06-30 PROBLEM — R73.03 PRE-DIABETES: Status: ACTIVE | Noted: 2017-06-22

## 2017-07-05 ENCOUNTER — OFFICE VISIT (OUTPATIENT)
Dept: RHEUMATOLOGY | Facility: CLINIC | Age: 62
End: 2017-07-05
Payer: COMMERCIAL

## 2017-07-05 DIAGNOSIS — M05.9 SEROPOSITIVE RHEUMATOID ARTHRITIS (H): Primary | ICD-10-CM

## 2017-07-05 LAB
ALBUMIN SERPL-MCNC: 3.7 G/DL (ref 3.4–5)
ALP SERPL-CCNC: 62 U/L (ref 40–150)
ALT SERPL W P-5'-P-CCNC: 54 U/L (ref 0–50)
AST SERPL W P-5'-P-CCNC: 33 U/L (ref 0–45)
BILIRUB DIRECT SERPL-MCNC: <0.1 MG/DL (ref 0–0.2)
BILIRUB SERPL-MCNC: 0.3 MG/DL (ref 0.2–1.3)
CRP SERPL-MCNC: <2.9 MG/L (ref 0–8)
ERYTHROCYTE [SEDIMENTATION RATE] IN BLOOD BY WESTERGREN METHOD: 12 MM/H (ref 0–30)
PROT SERPL-MCNC: 7.5 G/DL (ref 6.8–8.8)

## 2017-07-05 PROCEDURE — 85652 RBC SED RATE AUTOMATED: CPT | Performed by: STUDENT IN AN ORGANIZED HEALTH CARE EDUCATION/TRAINING PROGRAM

## 2017-07-05 PROCEDURE — 86140 C-REACTIVE PROTEIN: CPT | Performed by: STUDENT IN AN ORGANIZED HEALTH CARE EDUCATION/TRAINING PROGRAM

## 2017-07-05 PROCEDURE — 99214 OFFICE O/P EST MOD 30 MIN: CPT | Performed by: STUDENT IN AN ORGANIZED HEALTH CARE EDUCATION/TRAINING PROGRAM

## 2017-07-05 PROCEDURE — 80076 HEPATIC FUNCTION PANEL: CPT | Performed by: STUDENT IN AN ORGANIZED HEALTH CARE EDUCATION/TRAINING PROGRAM

## 2017-07-05 PROCEDURE — 36415 COLL VENOUS BLD VENIPUNCTURE: CPT | Performed by: STUDENT IN AN ORGANIZED HEALTH CARE EDUCATION/TRAINING PROGRAM

## 2017-07-05 NOTE — LETTER
Lesly Campbell  [address] [facility]  [place] [address]        July 14, 2017          Dear ,    We are writing to inform you of your test results.    Test results indicate you may require additional follow up, see comment below.    Your liver tests are abnormal . Better than before.    Resulted Orders   Hepatic panel   Result Value Ref Range    Bilirubin Direct <0.1 0.0 - 0.2 mg/dL    Bilirubin Total 0.3 0.2 - 1.3 mg/dL    Albumin 3.7 3.4 - 5.0 g/dL    Protein Total 7.5 6.8 - 8.8 g/dL    Alkaline Phosphatase 62 40 - 150 U/L    ALT 54 (H) 0 - 50 U/L    AST 33 0 - 45 U/L   CRP inflammation   Result Value Ref Range    CRP Inflammation <2.9 0.0 - 8.0 mg/L   ESR   Result Value Ref Range    Sed Rate 12 0 - 30 mm/h       Thank you very much for choosing [facility]. Please call my office if you have any questions or concerns.     Sincerely,    Aida Meza MD

## 2017-07-05 NOTE — PATIENT INSTRUCTIONS
-- I will do liver test today. If they are normal will start you on MTX.     -- Otherwise will consider other DMARD'S like Sulfasalazine or Biologic therapy     -- RTC in 8 weeks

## 2017-07-05 NOTE — NURSING NOTE
Lesly Campbell's goals for this visit include: Cc  She requests these members of her care team be copied on today's visit information: no    PCP: Janessa Farias    Referring Provider:  No referring provider defined for this encounter.    Chief Complaint   Patient presents with     Arthritis       Initial There were no vitals taken for this visit. There is no height or weight on file to calculate BMI.  Medication Reconciliation: complete

## 2017-07-05 NOTE — PROGRESS NOTES
Rheumatology Clinic Visit     Lesly Campbell MRN# [id number]   YOB: 1955 Age: 61 year old     Date of Visit: July 5, 2017  Primary care provider: Janessa Farias          Assessment and Plan:   ASSESSMENT:     1.  Seropositive rheumatoid arthritis, positive RF, positive CCP.   2.  Elevated ALT.  History of abnormal liver enzymes in the past.   3.  Fatigue and morning stiffness.     4.  Numbness and tingling in the fingertips.   5.  Diabetes and neuropathy.    Mrs. Campbell presented today for management of RA. She is taking 5 mg prednisone for the past 3 weeks. Her joint pains have improved. She still has morning stiffness but it's better than before. She needs DMARD therapy. Because of elevated LFTs on previous visit methotrexate was not started.  We will repeat LFTs today. Most likely they were elevated due to Tylenol use. If they will be normal, with start her on methotrexate. Otherwise sulfasalazine is another option. Sulfa drugs is listed as allergies but she reports that she gets GI upset with sulfa drugs, not skin rash or other hypersensitivity reaction. Hence trial of sulfasalazine can still be considered if methotrexate is not an option. Plaquenil monotherapy will not be effective especially her being seropositive and uncontrolled for many years.    Otherwise biologic agents like TNF inhibitors will be considered if non-biologic DMARDs are contraindicated.    Plan   -- Labs - CRP, ESR and LFTs  -- If LFTs are back to normal. Start her on 4 tablets of methotrexate once a week.  -- otherwise sulfasalazine starting with the low-dose 500 mg twice a day will be considered.  -- She will continue 5 mg prednisone for now. She can taper it down to 2.5 mg depending on her symptoms.  -- Most likely she will need combination therapy with 2 DMARD's, possibly 1 biologic agent.  -- RTC in 8 weeks              Active Problem List:     Patient Active Problem List    Diagnosis Date Noted     Pre-diabetes  06/22/2017     Priority: Medium     See telephone note and scanned documents.               History of Present Illness:   Lesly Campbell is a 61 year old female past medical history of rheumatoid arthritis, newly diagnosed diabetes, neuropathy seen in the clinic at consultation request of primary care Janessa Farisa for evaluation and treatment of rheumatoid arthritis. She is coming today for follow up.     Interim History - 7/5/17 - She is taking 5 mg prednisone. She has noticed significant improvement in joint pains especially in her hands, wrists and feet. Morning stiffness is better and reduced to less than 1 hour. She is not taking Tylenol at present. Takes ibuprofen 800 mg as needed basis, less frequent than before after start of prednisone. She still has numbness and tingling in her fingertips and heel and toes. She was recently diagnosed with diabetes and started metformin and gabapentin 100 mg.    Denies any new fever, chills, weight loss. Denies any raynauds, malar rash, photosensitivity, recurrent mouth/genital ulcers, sicca symptoms, pleuritic chest pains, recurrent sinusitis/rhinitis, swallowing difficulty, hearing or visual changes recently.     HPI from Initial consult  Ms. Campbell was diagnosed with rheumatoid arthritis in 2001.  She was followed by Dr. Jimenez in [facility] and later by Dr. Meade at [facility].  She has taken methotrexate 7 tablets in the past.  She did mention about taking self injectables as well as infusions but could not recall the name of her medications.  Due to loss of insurance she could not take these medications and could not follow up with her rheumatologist.  Since 2010, she has not taken her methotrexate.  She follows up with a primary care at [facility] and her pain is managed with Tylenol 1000 mg and ibuprofen 800 mg on as needed basis.  According to her, she was doing fine since 2010, had no flareups but starting in 05/2017 she started complaining  more pain in her hands and feet along with morning stiffness.  She also feels more fatigued and tired.  Her primary care did some labs including CBC, liver, kidney function and rheumatoid factor.  Her rheumatoid factor came back high at 54.  SILVINA was negative.  She had mild increase in ALT to 41.  Otherwise, had normal CRP and CBC.  Along with some mild stiffness and pain in her hands she also complains of a significant burning pain in her fingertips.  She was prescribed metformin and gabapentin by her primary care, but has not started taking these medications yet.  She was given a prednisone taper starting with 60 mg and tapering by 10 mg every 2 weeks to off.  She just finished her prednisone taper a few days ago.  Prednisone did help with her joint pains but it did not help with the numbness and tingling.  Plus, she did not like the side effects of prednisone.  She felt anxious and had sleep disturbances on it.              Review of Systems:   Review Of Systems  Constitutional: denies fever, chills, night sweats and weight loss.  Skin: No skin rash.  Eyes: No dryness or irritation in eyes. No episode of eye inflammation or redness.   Ears/Nose/Throat: no recurrent sinus infections.  Respiratory: No shortness of breath, dyspnea on exertion, cough, or hemoptysis  Cardiovascular: no chest pain or palpitations.  Gastrointestinal: no nausea, vomiting, abdominal pain.  Normal bowel movements.  Genitourinary: no dysuria, frequency  or hematuria.  Musculoskeletal: as in HPI  Neurologic: + numbness, tingling.  Psychiatric: no mood disorders.  Hematologic/Lymphatic/Immunologic: no history of easy bruising, petechia or purpura.  No abnormal bleeding.   Endocrine: no h/o thyroid disease, + Diabetes.                  Past Medical History:who     Past Medical History:   Diagnosis Date     Epigastric abdominal pain      Myalgia      Myositis      Neuropathy (H)      Pharyngitis      Prediabetes      Rheumatoid arthritis (H)       Past Surgical History:   Procedure Laterality Date     NO HISTORY OF SURGERY              Social History:     Social History     Occupational History     Not on file.     Social History Main Topics     Smoking status: Former Smoker     Quit date: 2008     Smokeless tobacco: Not on file     Alcohol use Yes     Drug use: No     Sexual activity: Not on file            Family History:     Family History   Problem Relation Age of Onset     Cirrhosis Mother      Liver Cancer Mother             Allergies:     Allergies   Allergen Reactions     Sulfa Drugs GI Disturbance     Antibiotics caused GI upset, diarrhea, vomiting, and cramps            Medications:     Current Outpatient Prescriptions   Medication Sig Dispense Refill     predniSONE (DELTASONE) 5 MG tablet Take 1 tablet (5 mg) by mouth daily 90 tablet 0     acetaminophen (TYLENOL) 500 MG tablet Take 500-1,000 mg by mouth every 6 hours as needed for mild pain       ibuprofen (ADVIL/MOTRIN) 800 MG tablet Take 800 mg by mouth every 8 hours as needed for moderate pain       sodium chloride (OCEAN) 0.65 % nasal spray Spray 2 sprays in nostril daily as needed for congestion       metFORMIN (GLUCOPHAGE-XR) 500 MG 24 hr tablet Take 500 mg by mouth daily (with breakfast)       gabapentin (NEURONTIN) 100 MG capsule Take 100 mg by mouth 2 times daily as needed       ONDANSETRON PO Take 8 mg by mouth every 8 hours as needed for nausea       OMEPRAZOLE PO Take 20 mg by mouth every morning       Methotrexate Sodium (METHOTREXATE PO) Take 2.5 mg by mouth once a week       FOLIC ACID PO Take 1 mg by mouth daily       azithromycin (ZITHROMAX) 250 MG tablet               Physical Exam:   Blood pressure (P) 126/78, pulse (P) 88, temperature (P) 97.9  F (36.6  C), temperature source (P) Oral, weight (P) 70.9 kg (156 lb 3.2 oz).  Wt Readings from Last 4 Encounters:   07/05/17 (P) 70.9 kg (156 lb 3.2 oz)   06/14/17 70.9 kg (156 lb 3.2 oz)       Constitutional: well-developed,  appearing stated age; cooperative  Eyes: nl EOM, PERRLA, vision, conjunctiva, sclera  ENT: nl external ears, nose, hearing, lips, teeth, gums, throat  No mucous membrane lesions, normal saliva pool  Neck: no mass or thyroid enlargement  Resp: lungs clear to auscultation, nl to palpation  CV: RRR, no murmurs, rubs or gallops, no edema  GI: no ABD mass or tenderness, no HSM  : not tested  Lymph: no cervical, supraclavicular, inguinal or epitrochlear nodes    MS: All TMJ, neck, shoulder, elbow, wrist, MCP/PIP/DIP, spine, hip, knee, ankle, and foot MTP/IP joints were examined.  -- s/t over 3 MCP over right hand. No active synovitis or deformity present over other MCP, PIP joints bilaterally. Full ROM.  Normal  strength.   -- tenderness on MTP squeeze. No synovitis tenderness over he MTP and ankles. No knee effusions.  -- No dactylitis,  tenosynovitis, enthesopathy.    Skin: no nail pitting, alopecia, rash, nodules or lesions  Neuro: nl cranial nerves, strength, sensation, DTRs.   Psych: nl judgement, orientation, memory, affect.         Data:     Results for orders placed or performed in visit on 06/14/17   X-ray bl hand 3+ vw    Narrative    Exam: Bilateral hands and wrists, 3 views each. 6/14/2017.    COMPARISON: None.    CLINICAL HISTORY: Rheumatoid arthritis.    FINDINGS: AP, oblique, and lateral views of the bilateral hands and  wrists were obtained. Bones are well aligned. Joint spaces are  well-maintained. No erosive changes. No displaced fractures. No soft  tissue abnormalities.      Impression    IMPRESSION: No erosive changes in the bilateral hands or wrists.    MARY GRACE DOMINGUEZ MD       Recent Labs   Lab Test  06/14/17   1215   WBC  7.1   RBC  4.37   HGB  13.2   HCT  40.3   MCV  92   RDW  14.7   PLT  283   ALBUMIN  3.6   CRP  9.7*      Recent Labs   Lab Test  06/14/17   1215   TSH  0.98     Hemoglobin   Date Value Ref Range Status   06/14/2017 13.2 11.7 - 15.7 g/dL Final     Sed Rate   Date Value Ref  Range Status   06/14/2017 29 0 - 30 mm/h Final     CRP Inflammation   Date Value Ref Range Status   06/14/2017 9.7 (H) 0.0 - 8.0 mg/L Final     AST   Date Value Ref Range Status   06/14/2017 45 0 - 45 U/L Final     Albumin   Date Value Ref Range Status   06/14/2017 3.6 3.4 - 5.0 g/dL Final     Alkaline Phosphatase   Date Value Ref Range Status   06/14/2017 69 40 - 150 U/L Final     ALT   Date Value Ref Range Status   06/14/2017 63 (H) 0 - 50 U/L Final     Recent Labs   Lab Test  06/14/17   1215   WBC  7.1   HGB  13.2   HCT  40.3   MCV  92   PLT  283   TSH  0.98   AST  45   ALT  63*   ALKPHOS  69     Other studies:   X-ray hands  FINDINGS: AP, oblique, and lateral views of the bilateral hands and  wrists were obtained. Bones are well aligned. Joint spaces are  well-maintained. No erosive changes. No displaced fractures. No soft  tissue abnormalities.         IMPRESSION: No erosive changes in the bilateral hands or wrists.    Outside studies reviewed:   Primary care at [facility] office labs done on 06/06/2017 showed creatinine of 0.7, BUN 15, AST 21, ALT raised to 44.  Lyme screen done, not back yet.  Rheumatoid factor high 54.  CRP was normal 0.13, SILVINA negative.  Hepatitis C is negative.  CBC in 05/2017 which showed normal hemoglobin.  No leukopenia.  Past labs in 2010, she had a CCP antibody which was high at 34.  rheumatoid factor was 16.     Reviewed Rheumatology lab flowsheet    Aida Meza MD  [facility]   Pager - [phone]

## 2017-07-05 NOTE — MR AVS SNAPSHOT
After Visit Summary   7/5/2017    Lesly Campbell    MRN: [id number]           Patient Information     Date Of Birth          1955        Visit Information        Provider Department      7/5/2017 11:00 AM Aida Meza MD [facility]        Today's Diagnoses     Seropositive rheumatoid arthritis (H)    -  1      Care Instructions    -- I will do liver test today. If they are normal will start you on MTX.     -- Otherwise will consider other DMARD'S like Sulfasalazine or Biologic therapy     -- RTC in 8 weeks           Follow-ups after your visit        Follow-up notes from your care team     Return in about 8 weeks (around 8/30/2017).      Your next 10 appointments already scheduled     Jul 14, 2017 11:00 AM CDT   Return Visit with Aida Meza MD   [facility] ([facility])    [address] [place] [address] [id number] [phone]            Sep 11, 2017 11:00 AM CDT   Return Visit with Aida Meza MD   [facility] ([facility])    [address] [place] [address] [phone] [phone]              Who to contact     If you have questions or need follow up information about today's clinic visit or your schedule please contact [facility] directly at [phone].  Normal or non-critical lab and imaging results will be communicated to you by MyChart, letter or phone within 4 business days after the clinic has received the results. If you do not hear from us within 7 days, please contact the clinic through MyChart or phone. If you have a critical or abnormal lab result, we will notify you by phone as soon as possible.  Submit refill requests through [facility] or call your pharmacy and they will forward the refill request to us. Please allow 3 business days for your refill to be completed.          Additional Information About Your Visit        MyChart Information      [employer] is an electronic gateway that provides easy, online access to your medical records. With Diffon, you can request a clinic appointment, read your test results, renew a prescription or communicate with your care team.     To sign up for [employer] visit the website at [email or web address]   You will be asked to enter the access code listed below, as well as some personal information. Please follow the directions to create your username and password.     Your access code is: [email or web address]  Expires: 2017 12:08 PM     Your access code will  in 90 days. If you need help or a new code, please contact your [facility] or call [phone] for assistance.        Care EveryWhere ID     This is your Care EveryWhere ID. This could be used by other organizations to access your [place] medical records  [id number]         Blood Pressure from Last 3 Encounters:   17 (P) 126/78   17 109/74    Weight from Last 3 Encounters:   17 (P) 70.9 kg (156 lb 3.2 oz)   17 70.9 kg (156 lb 3.2 oz)              We Performed the Following     CRP inflammation     ESR     Hepatic panel        Primary Care Provider Office Phone # Fax #    Janessa Farias [phone]        [facility]ITY [address] [facility] [address]        Equal Access to Services     SHANNON GREENE : Hadii aad ku hadasho Soomaali, waaxda luqadaha, qaybta kaalmada adeegyada, chi stephenson haytalia najera . So [facility] [phone].    ATENCIÓN: Si habla español, tiene a york disposición servicios gratuitos de asistencia lingüística. Kiraname al [phone].    We comply with applicable federal civil rights laws and [place] laws. We do not discriminate on the basis of race, color, national origin, age, disability sex, sexual orientation or gender identity.            Thank you!     Thank you for choosing [facility]  for your care. Our goal is always  to provide you with excellent care. Hearing back from our patients is one way we can continue to improve our services. Please take a few minutes to complete the written survey that you may receive in the mail after your visit with us. Thank you!             Your Updated Medication List - Protect others around you: Learn how to safely use, store and throw away your medicines at www.disposemymeds.org.          This list is accurate as of: 7/5/17 11:33 AM.  Always use your most recent med list.                   Brand Name Dispense Instructions for use Diagnosis    acetaminophen 500 MG tablet    TYLENOL     Take 500-1,000 mg by mouth every 6 hours as needed for mild pain        azithromycin 250 MG tablet    ZITHROMAX      Rheumatoid arthritis involving multiple sites with positive rheumatoid factor (H), Chronic fatigue       FOLIC ACID PO      Take 1 mg by mouth daily        gabapentin 100 MG capsule    NEURONTIN     Take 100 mg by mouth 2 times daily as needed        ibuprofen 800 MG tablet    ADVIL/MOTRIN     Take 800 mg by mouth every 8 hours as needed for moderate pain        metFORMIN 500 MG 24 hr tablet    GLUCOPHAGE-XR     Take 500 mg by mouth daily (with breakfast)        METHOTREXATE PO      Take 2.5 mg by mouth once a week        OMEPRAZOLE PO      Take 20 mg by mouth every morning        ONDANSETRON PO      Take 8 mg by mouth every 8 hours as needed for nausea        predniSONE 5 MG tablet    DELTASONE    90 tablet    Take 1 tablet (5 mg) by mouth daily    Rheumatoid arthritis involving multiple sites with positive rheumatoid factor (H), Chronic fatigue       sodium chloride 0.65 % nasal spray    OCEAN     Spray 2 sprays in nostril daily as needed for congestion

## 2017-07-14 ENCOUNTER — TELEPHONE (OUTPATIENT)
Dept: RHEUMATOLOGY | Facility: CLINIC | Age: 62
End: 2017-07-14

## 2017-07-14 DIAGNOSIS — M05.79 RHEUMATOID ARTHRITIS INVOLVING MULTIPLE SITES WITH POSITIVE RHEUMATOID FACTOR (H): ICD-10-CM

## 2017-07-14 DIAGNOSIS — Z79.899 HIGH RISK MEDICATION USE: Primary | ICD-10-CM

## 2017-07-14 DIAGNOSIS — R53.82 CHRONIC FATIGUE: ICD-10-CM

## 2017-07-14 DIAGNOSIS — Z79.899 ON SULFASALAZINE THERAPY: ICD-10-CM

## 2017-07-14 PROBLEM — G62.9 NEUROPATHY: Status: ACTIVE | Noted: 2017-07-14

## 2017-07-14 PROBLEM — M05.9 SEROPOSITIVE RHEUMATOID ARTHRITIS (H): Status: ACTIVE | Noted: 2017-07-14

## 2017-07-14 RX ORDER — SULFASALAZINE 500 MG/1
TABLET, DELAYED RELEASE ORAL
Qty: 90 TABLET | Refills: 1 | Status: SHIPPED | OUTPATIENT
Start: 2017-07-14 | End: 2017-08-02 | Stop reason: SINTOL

## 2017-07-28 DIAGNOSIS — Z79.899 HIGH RISK MEDICATION USE: ICD-10-CM

## 2017-07-28 DIAGNOSIS — M05.79 RHEUMATOID ARTHRITIS INVOLVING MULTIPLE SITES WITH POSITIVE RHEUMATOID FACTOR (H): ICD-10-CM

## 2017-07-28 DIAGNOSIS — R53.82 CHRONIC FATIGUE: ICD-10-CM

## 2017-07-28 DIAGNOSIS — Z79.899 ON SULFASALAZINE THERAPY: ICD-10-CM

## 2017-07-28 LAB
ALBUMIN SERPL-MCNC: 3.8 G/DL (ref 3.4–5)
ALP SERPL-CCNC: 64 U/L (ref 40–150)
ALT SERPL W P-5'-P-CCNC: 71 U/L (ref 0–50)
AST SERPL W P-5'-P-CCNC: 46 U/L (ref 0–45)
BASOPHILS # BLD AUTO: 0 10E9/L (ref 0–0.2)
BASOPHILS NFR BLD AUTO: 0.5 %
BILIRUB DIRECT SERPL-MCNC: <0.1 MG/DL (ref 0–0.2)
BILIRUB SERPL-MCNC: 0.3 MG/DL (ref 0.2–1.3)
CREAT SERPL-MCNC: 0.64 MG/DL (ref 0.52–1.04)
DIFFERENTIAL METHOD BLD: NORMAL
EOSINOPHIL # BLD AUTO: 0.3 10E9/L (ref 0–0.7)
EOSINOPHIL NFR BLD AUTO: 4.5 %
ERYTHROCYTE [DISTWIDTH] IN BLOOD BY AUTOMATED COUNT: 14.9 % (ref 10–15)
GFR SERPL CREATININE-BSD FRML MDRD: NORMAL ML/MIN/1.7M2
HCT VFR BLD AUTO: 41.9 % (ref 35–47)
HGB BLD-MCNC: 13.4 G/DL (ref 11.7–15.7)
LYMPHOCYTES # BLD AUTO: 2.1 10E9/L (ref 0.8–5.3)
LYMPHOCYTES NFR BLD AUTO: 27.7 %
MCH RBC QN AUTO: 29.8 PG (ref 26.5–33)
MCHC RBC AUTO-ENTMCNC: 32 G/DL (ref 31.5–36.5)
MCV RBC AUTO: 93 FL (ref 78–100)
MONOCYTES # BLD AUTO: 0.6 10E9/L (ref 0–1.3)
MONOCYTES NFR BLD AUTO: 7.8 %
NEUTROPHILS # BLD AUTO: 4.5 10E9/L (ref 1.6–8.3)
NEUTROPHILS NFR BLD AUTO: 59.5 %
PLATELET # BLD AUTO: 318 10E9/L (ref 150–450)
PROT SERPL-MCNC: 7.6 G/DL (ref 6.8–8.8)
RBC # BLD AUTO: 4.49 10E12/L (ref 3.8–5.2)
WBC # BLD AUTO: 7.6 10E9/L (ref 4–11)

## 2017-07-28 PROCEDURE — 85025 COMPLETE CBC W/AUTO DIFF WBC: CPT | Performed by: STUDENT IN AN ORGANIZED HEALTH CARE EDUCATION/TRAINING PROGRAM

## 2017-07-28 PROCEDURE — 82565 ASSAY OF CREATININE: CPT | Performed by: STUDENT IN AN ORGANIZED HEALTH CARE EDUCATION/TRAINING PROGRAM

## 2017-07-28 PROCEDURE — 80076 HEPATIC FUNCTION PANEL: CPT | Performed by: STUDENT IN AN ORGANIZED HEALTH CARE EDUCATION/TRAINING PROGRAM

## 2017-07-28 PROCEDURE — 36415 COLL VENOUS BLD VENIPUNCTURE: CPT | Performed by: STUDENT IN AN ORGANIZED HEALTH CARE EDUCATION/TRAINING PROGRAM

## 2017-07-31 ENCOUNTER — CARE COORDINATION (OUTPATIENT)
Dept: RHEUMATOLOGY | Facility: CLINIC | Age: 62
End: 2017-07-31

## 2017-07-31 ENCOUNTER — TELEPHONE (OUTPATIENT)
Dept: RHEUMATOLOGY | Facility: CLINIC | Age: 62
End: 2017-07-31

## 2017-07-31 DIAGNOSIS — M05.9 SEROPOSITIVE RHEUMATOID ARTHRITIS (H): ICD-10-CM

## 2017-07-31 DIAGNOSIS — Z79.899 HIGH RISK MEDICATION USE: ICD-10-CM

## 2017-07-31 DIAGNOSIS — R79.89 ELEVATED LFTS: Primary | ICD-10-CM

## 2017-07-31 NOTE — PROGRESS NOTES
Message  Received: Today       Aida Meza MD  [facility]                     -- Her LFT are high. Most likely drug related.  Will get Abdominal Ultrasound to rule out any hepatic abnormality.     -- Stop SSZ . Will need biologic agent ( anti-TNF inhibitor ) like Enbrel, 50 mg SQ/Week. Can you call her and discuss about Enbrel.     -- Will need immunizations - Pneumococcal and shingles vaccine. After giving shingles vaccine will wait for at least 2 - 3 weeks before starting Enbrel.     -- She can take 5 mg prednisone in the interim for RA.     -- Repeat LFT in 2 Weeks.       Notified of Dr. Meza's recommendations, verbalizes understanding and agrees with plan.     Reports improvement in rash and feeling not as tired/fatigued since she stopped taking sulfasalazine.     She reports that she tired Enbrel for about a year with good management of RA symptoms, ordered by Dr. Jc. She does not remember the date or year when she was on Enbrel.    Also, she reports receiving vaccines for shingles and pneumococcal from [facility] or [facility].   --Need to check [facility] for immunizations.     She will start taking prednisone 5 mg daily today for RA symptoms.     --Need abdominal US orders to schedule.     Routed to Dr. Meza for ultrasound orders.    Lizet Adame, RYANN, RN, PHN  Rheumatology Care Coordinator  [facility]

## 2017-07-31 NOTE — PROGRESS NOTES
-- Her LFT are high. Most likely drug related.  Will get Abdominal Ultrasound to rule out any hepatic abnormality.     -- Stop SSZ . Will need biologic agent ( anti-TNF inhibitor ) like Enbrel, 50 mg SQ/Week. Can you call her and discuss about Enbrel.     -- Will need immunizations - Pneumococcal and shingles vaccine. After giving shingles vaccine will wait for at least 2 - 3 weeks before starting Enbrel.     -- She can take 5 mg prednisone in the interim for RA.     -- Repeat LFT in 2 Weeks.

## 2017-07-31 NOTE — TELEPHONE ENCOUNTER
Prior Authorization Specialty Medication Request    Medication/Dose: Etanercept (ENBREL SURECLICK) 50 MG/ML autoinjector  Diagnosis and ICD: Associated Diagnoses   Seropositive rheumatoid arthritis (H) [M05.9]           New/Renewal/Insurance Change PA: NEW    Important Lab Values: see chart    Previously Tried and Failed Therapies: see Dr. Meza's notes    Rationale:     Would you like to include any research articles?    If yes please include the hyperlink(s) below or fax @ [phone].    (Include Name and MRN)    If you received a fax notification from an outside Pharmacy;  Pharmacy Name:  Pharmacy #:  Pharmacy Fax:    Routed to PA team to initiate PA with covermymeds    Lizet Adame, RYANN, RN, PHN  Rheumatology Care Coordinator  [facility]

## 2017-08-01 ENCOUNTER — TELEPHONE (OUTPATIENT)
Dept: RHEUMATOLOGY | Facility: CLINIC | Age: 62
End: 2017-08-01

## 2017-08-01 DIAGNOSIS — M05.9 SEROPOSITIVE RHEUMATOID ARTHRITIS (H): ICD-10-CM

## 2017-08-01 DIAGNOSIS — G62.9 NEUROPATHY: ICD-10-CM

## 2017-08-01 DIAGNOSIS — R73.03 PRE-DIABETES: ICD-10-CM

## 2017-08-01 DIAGNOSIS — R79.89 ELEVATED LFTS: Primary | ICD-10-CM

## 2017-08-01 NOTE — TELEPHONE ENCOUNTER
[facility] Call Center    Phone Message    Name of Caller: Natasha    Phone Number: [phone]    Best time to return call: any    May a detailed message be left on voicemail: yes    Relation to patient: Other Name: Natasha  Relationship: [employer]  Is there legal documentation in chart to discuss information with this person: Yes. Legal Documentation is verified by [facility].      Reason for Call: Natasha from [facility] called to let the Clinic know that the Prior Auth coverage was denied, will send fax    Action Taken: Message routed to:  Adult Clinics: Rheumatology p [id number]

## 2017-08-01 NOTE — TELEPHONE ENCOUNTER
Patient notified that the US order has been placed by Dr. Meza.  Patient will call to schedule this.

## 2017-08-02 NOTE — PROGRESS NOTES
Patient scheduled for US on  at 9AM in [place]. Nothing by mouth 8 hours prior to procedure, okay to take medication and small slips of water. Pt will complete repeat labs on same day.     Also, received approval on Enbrel and will be shipped to home tomorrow. Pt states that she feels comfortable giving self-injections and will sign up for enbrel support online. She asks if she should wait to start medication until repeat labs and ultrasound are completed?     Plus, she said that her immunization are updated but would like them reviewed.     [facility] [place] Immunization Information Connection        Client Information VFC Eligible: No       Client Name (First - MI - Last)  Gender [facility] [place] Tracking Schedule Chart #   FREDY BOYLE  1955 LEAH SILVERAURELIA [facility]           Address [address] [phone]          Comments                 History      Vaccine Group Date Administered Series Trade Name Dose Owned? Reaction Hist    H1N1 Novel Flu  11/10/2009       Yes     Influenza  2006  Booster      Yes     Influenza  2009  Booster      Yes     Influenza  09/15/2010  Booster   Full    No     Influenza  10/15/2011  Booster   Full    No     Influenza  10/11/2012  Booster   Full    No     Influenza  10/21/2013  Booster  FluLaval 3+ yrs     Yes     Influenza  10/09/2014  Booster   Full    No     Influenza  2015  Booster   Full    No     Influenza  10/12/2016  Booster  Fluzone quad 3+ yrs  Full    No     Pneumo-poly  2014  1 of 2   Full    No     Td/Tdap  2010  1 of 4  Boostrix 10+ yrs  Full    No     Zoster/shingles  2013  1 of 1  Zostavax  Full    No         Current Age: 61 years, 9 months, 10 days   Vaccines Recommended by Selected Tracking Schedule       Vaccine Group Earliest Date Recommended Date Overdue Date Latest Date    Influenza  2016 2017 10/12/2017     Pneumo-conj  10/23/2019 10/23/2019 10/23/2021     Pneumo-poly   10/23/2020 10/23/2020 10/23/2021     Td/Tdap  09/17/2010 09/17/2010 11/12/2010     Zoster/shingles  Complete             Dr. Meza,  Do you want her to wait on starting Enbrel until she completes US and repeat labs on 8/9?     Immunizations: completed singles vaccine 2013 and Pneumococcal polysaccharide vaccine (PPSV23)  2014 but not Pneumococcal conjugate vaccine (PCV13).    Lizet Adame, BSN, RN, PHN  Rheumatology Care Coordinator  [facility]

## 2017-08-03 NOTE — PROGRESS NOTES
Date: 8/3/2017    Time of Call: 2:15 PM     Diagnosis:  RA     [ VORB ] Ordering provider: Dr. Meza  Order: ok to start Enbrel. Needs pneumococcal conjugate vaccine (PCV13) now.      Order received by: TIAGO Stephens RN     Follow-up/additional notes: attempted to contact patient, left detailed voicemail re: Dr. Meza's recommendation, advised to call back if any questions or concerns.

## 2017-08-03 NOTE — TELEPHONE ENCOUNTER
Patient notified of Dr. Meza's recommendations re: starting Enbrel and to get PCV13 now. Patient will start Enbrel on Friday and will get immunization at the  pharmacy next Wednesday.     Spoke with pharmacist at [facility], pharmacy will give immunizations. Asked to place order for prevnar 13. Order completed and sent to provider for co-sign.    RYAN StephensN, RN, PHN  Rheumatology Care Coordinator  [facility]

## 2017-08-03 NOTE — TELEPHONE ENCOUNTER
Patient called and had a couple questions for the Rheumatology care team, specifically asked for Lizet Adame. Would like clinic to call her back as soon as possible to address her questions

## 2017-08-09 ENCOUNTER — RADIANT APPOINTMENT (OUTPATIENT)
Dept: ULTRASOUND IMAGING | Facility: CLINIC | Age: 62
End: 2017-08-09
Attending: STUDENT IN AN ORGANIZED HEALTH CARE EDUCATION/TRAINING PROGRAM
Payer: COMMERCIAL

## 2017-08-09 ENCOUNTER — TELEPHONE (OUTPATIENT)
Dept: RHEUMATOLOGY | Facility: CLINIC | Age: 62
End: 2017-08-09

## 2017-08-09 DIAGNOSIS — R79.89 ELEVATED LFTS: ICD-10-CM

## 2017-08-09 DIAGNOSIS — M05.9 SEROPOSITIVE RHEUMATOID ARTHRITIS (H): ICD-10-CM

## 2017-08-09 DIAGNOSIS — R73.03 PRE-DIABETES: ICD-10-CM

## 2017-08-09 DIAGNOSIS — G62.9 NEUROPATHY: ICD-10-CM

## 2017-08-09 DIAGNOSIS — Z79.899 HIGH RISK MEDICATION USE: ICD-10-CM

## 2017-08-09 LAB
ALBUMIN SERPL-MCNC: 3.7 G/DL (ref 3.4–5)
ALP SERPL-CCNC: 57 U/L (ref 40–150)
ALT SERPL W P-5'-P-CCNC: 65 U/L (ref 0–50)
AST SERPL W P-5'-P-CCNC: 32 U/L (ref 0–45)
BILIRUB DIRECT SERPL-MCNC: <0.1 MG/DL (ref 0–0.2)
BILIRUB SERPL-MCNC: 0.3 MG/DL (ref 0.2–1.3)
PROT SERPL-MCNC: 7.7 G/DL (ref 6.8–8.8)

## 2017-08-09 PROCEDURE — 36415 COLL VENOUS BLD VENIPUNCTURE: CPT | Performed by: STUDENT IN AN ORGANIZED HEALTH CARE EDUCATION/TRAINING PROGRAM

## 2017-08-09 PROCEDURE — 80076 HEPATIC FUNCTION PANEL: CPT | Performed by: STUDENT IN AN ORGANIZED HEALTH CARE EDUCATION/TRAINING PROGRAM

## 2017-08-09 PROCEDURE — 76700 US EXAM ABDOM COMPLETE: CPT

## 2017-08-09 NOTE — TELEPHONE ENCOUNTER
Chart reviewed. Patient due for PCV 13 and to start Enbrel.  Spoke with patient today, in clinic for abdominal ultrasound, labs, PCV 13 vaccine. She gave her first dose of Enbrel on Sunday, noticed injection site discomfort afterwards into her arm on left side. no other symptoms noted. She reports history of using syringes and new to the autoinjector. She would like Rx changed to syringes at next .   Advised to monitor and report to clinic if site injection symptoms worsen.     Prescription updated and sent to provider for review/co-sign.  specialty pharmacy notified for next refill.       Lizet Adame, RYANN, RN, PHN  Rheumatology Care Coordinator  [facility]

## 2017-08-13 NOTE — TELEPHONE ENCOUNTER
"Received call from PCP care coordinator, after reviewing consult note from rheumatologist, wanted to clarify diagnosis. She reported patient is newly \"pre-diabetic\" not type 2 diabetic. Only treating with metformin to prevent type 2 diabetes. See faxed documents.      RYAN StephensN, RN, PHN  Rheumatology Care Coordinator  [facility]    " normal...

## 2017-08-29 ENCOUNTER — TELEPHONE (OUTPATIENT)
Dept: RHEUMATOLOGY | Facility: CLINIC | Age: 62
End: 2017-08-29

## 2017-08-29 DIAGNOSIS — M05.9 SEROPOSITIVE RHEUMATOID ARTHRITIS (H): ICD-10-CM

## 2017-08-29 NOTE — TELEPHONE ENCOUNTER
[facility] Call Center    Phone Message    Name of Caller: Terra    Phone Number: Other phone number:  [phone]    Best time to return call: Any    May a detailed message be left on voicemail: yes    Relation to patient: Other Name: Terra  Relationship: [facility]  Is there legal documentation in chart to discuss information with this person: NA      Reason for Call: Other: Terra is calling to request a verbal or written order for new Rx for etanercept (ENBREL) 50 MG/ML injection [[id number]] (Order [id number]) . She states that the last time patient filled the Rx there was a defective pack and [facility] has agreeded to replace free of charge but needs that verbal order to do so. Please advise.      Action Taken: Message routed to:  Adult Clinics: Rheumatology p [id number]

## 2017-08-29 NOTE — TELEPHONE ENCOUNTER
Called and spoke to Pharmacist Rudolph at [facility], and advised with medication administration per patient med list directions for replacement Enbrel syringe,  is ok to mail to patient. Pharmacist  will send replacement syringe out to patient.

## 2017-08-31 NOTE — TELEPHONE ENCOUNTER
Spoke with patient, reports missing last dose of Enbrel due to miss fire of autoinjector, last week. Next dose due tomorrow. Reported new symptom of swelling in ankles, dull ache in left leg but not painful. She went for long car ride [facility] about 4 hrs, weather hot/humided out. She had 2 drinks on Monday, Tuesday night she had 3 beers. Yesterday the swelling started after she returned home. Today, she describes non pitting edema and some improvement. She asks if it is related to the Enbrel. Plus, she stopped her Prednisone five days go. Prednisone caused weight gain and felt bloated.       Date: 8/31/2017    Time of Call: 9:55 AM     Diagnosis:  Unspecified edema     [ VORB ] Ordering provider: Dr. Meza  Order:   -- Check if any pain in legs. Advise to elevate legs, walk and monitor. Sometimes prolonged sitting can cause some swelling.  -- If lower extremity edema return, seek medical attention for evaluation of symptoms to make sure there is no blood clot.   -- Labs prior to next office visit: CBC, ALT, AST, albumin, creatinine, CRP, ESR     Order received by: TIAGO Stephens RN     Follow-up/additional notes: Patient notified of Dr. Meza's recommendations, verbalizes understanding and agrees with plan.   --She requests abdominal ultrasound results.

## 2017-08-31 NOTE — TELEPHONE ENCOUNTER
[facility] Call Center    Phone Message    Name of Caller: FREDY BOYLE    Phone Number: Home number on file [phone] (home)    Best time to return call: TODAY    May a detailed message be left on voicemail: yes    Relation to patient: Self    Reason for Call: Other: Pt has question regarding possible reaction from ENBREL medication.  (Lizet took call.  Requested message to be sent up)     Action Taken: Message routed to:  Adult Clinics: Rheumatology p [id number]

## 2017-09-11 ENCOUNTER — TELEPHONE (OUTPATIENT)
Dept: RHEUMATOLOGY | Facility: CLINIC | Age: 62
End: 2017-09-11

## 2017-09-11 ENCOUNTER — OFFICE VISIT (OUTPATIENT)
Dept: RHEUMATOLOGY | Facility: CLINIC | Age: 62
End: 2017-09-11
Payer: COMMERCIAL

## 2017-09-11 VITALS
OXYGEN SATURATION: 97 % | SYSTOLIC BLOOD PRESSURE: 124 MMHG | BODY MASS INDEX: 30.72 KG/M2 | TEMPERATURE: 98.2 F | HEART RATE: 93 BPM | HEIGHT: 61 IN | DIASTOLIC BLOOD PRESSURE: 77 MMHG | RESPIRATION RATE: 16 BRPM | WEIGHT: 162.7 LBS

## 2017-09-11 DIAGNOSIS — M05.9 SEROPOSITIVE RHEUMATOID ARTHRITIS (H): ICD-10-CM

## 2017-09-11 DIAGNOSIS — M05.741 RHEUMATOID ARTHRITIS INVOLVING BOTH HANDS WITH POSITIVE RHEUMATOID FACTOR (H): ICD-10-CM

## 2017-09-11 DIAGNOSIS — R74.8 LIVER ENZYME ELEVATION: Primary | ICD-10-CM

## 2017-09-11 DIAGNOSIS — M05.742 RHEUMATOID ARTHRITIS INVOLVING BOTH HANDS WITH POSITIVE RHEUMATOID FACTOR (H): ICD-10-CM

## 2017-09-11 LAB
ALBUMIN SERPL-MCNC: 3.8 G/DL (ref 3.4–5)
ALP SERPL-CCNC: 63 U/L (ref 40–150)
ALT SERPL W P-5'-P-CCNC: 88 U/L (ref 0–50)
AST SERPL W P-5'-P-CCNC: 43 U/L (ref 0–45)
BASOPHILS # BLD AUTO: 0.1 10E9/L (ref 0–0.2)
BASOPHILS NFR BLD AUTO: 1.3 %
BILIRUB DIRECT SERPL-MCNC: <0.1 MG/DL (ref 0–0.2)
BILIRUB SERPL-MCNC: 0.3 MG/DL (ref 0.2–1.3)
CREAT SERPL-MCNC: 0.59 MG/DL (ref 0.52–1.04)
CRP SERPL-MCNC: <2.9 MG/L (ref 0–8)
DIFFERENTIAL METHOD BLD: NORMAL
EOSINOPHIL # BLD AUTO: 0.3 10E9/L (ref 0–0.7)
EOSINOPHIL NFR BLD AUTO: 4 %
ERYTHROCYTE [DISTWIDTH] IN BLOOD BY AUTOMATED COUNT: 14.4 % (ref 10–15)
ERYTHROCYTE [SEDIMENTATION RATE] IN BLOOD BY WESTERGREN METHOD: 9 MM/H (ref 0–30)
GFR SERPL CREATININE-BSD FRML MDRD: >90 ML/MIN/1.7M2
HCT VFR BLD AUTO: 42 % (ref 35–47)
HGB BLD-MCNC: 13.7 G/DL (ref 11.7–15.7)
LYMPHOCYTES # BLD AUTO: 2.1 10E9/L (ref 0.8–5.3)
LYMPHOCYTES NFR BLD AUTO: 29.6 %
MCH RBC QN AUTO: 30 PG (ref 26.5–33)
MCHC RBC AUTO-ENTMCNC: 32.6 G/DL (ref 31.5–36.5)
MCV RBC AUTO: 92 FL (ref 78–100)
MONOCYTES # BLD AUTO: 0.6 10E9/L (ref 0–1.3)
MONOCYTES NFR BLD AUTO: 8.5 %
NEUTROPHILS # BLD AUTO: 3.9 10E9/L (ref 1.6–8.3)
NEUTROPHILS NFR BLD AUTO: 56.6 %
PLATELET # BLD AUTO: 312 10E9/L (ref 150–450)
PROT SERPL-MCNC: 7.4 G/DL (ref 6.8–8.8)
RBC # BLD AUTO: 4.56 10E12/L (ref 3.8–5.2)
WBC # BLD AUTO: 6.9 10E9/L (ref 4–11)

## 2017-09-11 PROCEDURE — 82565 ASSAY OF CREATININE: CPT | Performed by: STUDENT IN AN ORGANIZED HEALTH CARE EDUCATION/TRAINING PROGRAM

## 2017-09-11 PROCEDURE — 85025 COMPLETE CBC W/AUTO DIFF WBC: CPT | Performed by: STUDENT IN AN ORGANIZED HEALTH CARE EDUCATION/TRAINING PROGRAM

## 2017-09-11 PROCEDURE — 99214 OFFICE O/P EST MOD 30 MIN: CPT | Performed by: STUDENT IN AN ORGANIZED HEALTH CARE EDUCATION/TRAINING PROGRAM

## 2017-09-11 PROCEDURE — 86140 C-REACTIVE PROTEIN: CPT | Performed by: STUDENT IN AN ORGANIZED HEALTH CARE EDUCATION/TRAINING PROGRAM

## 2017-09-11 PROCEDURE — 85652 RBC SED RATE AUTOMATED: CPT | Performed by: STUDENT IN AN ORGANIZED HEALTH CARE EDUCATION/TRAINING PROGRAM

## 2017-09-11 PROCEDURE — 36415 COLL VENOUS BLD VENIPUNCTURE: CPT | Performed by: STUDENT IN AN ORGANIZED HEALTH CARE EDUCATION/TRAINING PROGRAM

## 2017-09-11 PROCEDURE — 80076 HEPATIC FUNCTION PANEL: CPT | Performed by: STUDENT IN AN ORGANIZED HEALTH CARE EDUCATION/TRAINING PROGRAM

## 2017-09-11 ASSESSMENT — PAIN SCALES - GENERAL: PAINLEVEL: NO PAIN (0)

## 2017-09-11 NOTE — TELEPHONE ENCOUNTER
Prior Authorization Specialty Medication Request    Medication/Dose: Enbrel Injectable  Diagnosis and ICD: Seropositive Rheumatoid Arthritis H MO5.9  New/Renewal/Insurance Change PA:  Kamlesh       Previously Tried and Failed Therapies:      Rationale: Plaquenil monotherapy will not be effective especially her being seropositive an uncontrolled.     Would you like to include any research articles? Not at this time   If yes please include the hyperlink(s) below or fax @ [phone].    (Include Name and MRN)    If you received a fax notification from an outside Pharmacy;  Pharmacy Name: [facility]   Pharmacy #:[phone]      If pharmacy does not cover Enbrel, what alternative drug will they cover?    Thank you.

## 2017-09-11 NOTE — LETTER
September 20, 2017       TO: Lesly Campbell  [address] [facility] [address]         Insurance Name: [facility]  Patient: Lesly Campbell  ID#:[id number]      To Whom this May Concern,    I am writing to request authorization to use the etanercept (ENBREL) 50 MG/ML injectable medication which is medical necessary for my patient, Lesly Campbell.  I am a Rheumatologist at the [facility], and I treat Lesly Campbell for chronic problems related to Seropositive rheumatoid arthritis, positive RF, positive CCP, since June, 2017.   Ms. Campbell presented today for management of RA. She is on Enbrel 50 mg subcutaneous once a week for the last 2 months. Her joint pains have improved. She still has morning stiffness but it's better than before. She is off of prednisone now. She was given sulfasalazine before but could not tolerate it due to GI side effects. Her insurance is not able to cover Enbrel so we need to appeal it or switch to another biologic agent which is covered. But will not be sure if another biologic will be effective.      She has elevated LFTs mostly ALT since June of this year. Sulfasalazine use made it worse. She is off of sulfasalazine now for the past 2 months even then her liver function tests have not improved. The ultrasound showed Fatty infiltration of the liver. I will consider GI referral for further evaluation. Will get F actin antibody for autoimmune hepatitis.     Plan: Continue Enbrel 50 mg q. once a week.  Therefore, I am requesting approval for etanercept (ENBREL) 50 MG/ML injectable medication as the best choice of therapy and are requesting that you approve our decision to provide care to our patient, allowing us to provide the best treatment option available.   Thank you for your immediate attention to this issue and would appreciate haste in your determination.    Sincerely,      Aida Meza MD Ph.D/jl  Rheumatolgist  Division of Rheumatic and  Autoimmune Diseases  [facility]  [address] [address] [place] [id number]

## 2017-09-11 NOTE — Clinical Note
Hi Dr. Meza,  Please review this letter and make any changes that you feel should be made prior to me faxing to insurance company.  This is an appeal letter for [employer].    Thank you  Jacquelyn

## 2017-09-11 NOTE — MR AVS SNAPSHOT
After Visit Summary   9/11/2017    Lesly Campbell    MRN: [id number]           Patient Information     Date Of Birth          1955        Visit Information        Provider Department      9/11/2017 11:00 AM Aida Meza MD [facility]        Care Instructions    -- Your liver function tests is still high    -- I will do tests for autoimmune hepatitis     -- Will give hepatology referral     -- Continue Enbrel , will change depending upon insurance coverage            Follow-ups after your visit        Follow-up notes from your care team     Return in about 3 months (around 12/11/2017).      Your next 10 appointments already scheduled     Dec 11, 2017 11:00 AM CST   Return Visit with Aida Meza MD   [facility] ([facility])    [address] [place] [id number] [phone]              Who to contact     If you have questions or need follow up information about today's clinic visit or your schedule please contact [facility] directly at [phone].  Normal or non-critical lab and imaging results will be communicated to you by MyChart, letter or phone within 4 business days after the clinic has received the results. If you do not hear from us within 7 days, please contact the clinic through AirPOShart or phone. If you have a critical or abnormal lab result, we will notify you by phone as soon as possible.  Submit refill requests through [employer] or call your pharmacy and they will forward the refill request to us. Please allow 3 business days for your refill to be completed.          Additional Information About Your Visit        AirPOSharStat Information     Sebacia is an electronic gateway that provides easy, online access to your medical records. With [facility], you can request a clinic appointment, read your test results, renew a prescription or communicate with your care team.     To sign up for [employer]  "visit the website at [email or web address]   You will be asked to enter the access code listed below, as well as some personal information. Please follow the directions to create your username and password.     Your access code is: [email or web address]  Expires: 2017 12:08 PM     Your access code will  in 90 days. If you need help or a new code, please contact your [facility] or call [phone] for assistance.        Care EveryWhere ID     This is your Care EveryWhere ID. This could be used by other organizations to access your [facility] medical records  [id number]        Your Vitals Were     Pulse Temperature Respirations Height Pulse Oximetry BMI (Body Mass Index)    93 98.2  F (36.8  C) (Oral) 16 1.549 m (5' 1\") 97% 30.74 kg/m2       Blood Pressure from Last 3 Encounters:   17 124/77   17 (P) 126/78   17 109/74    Weight from Last 3 Encounters:   17 73.8 kg (162 lb 11.2 oz)   17 (P) 70.9 kg (156 lb 3.2 oz)   17 70.9 kg (156 lb 3.2 oz)              Today, you had the following     No orders found for display       Primary Care Provider Office Phone # Fax #    Janessa Farias [phone]        [place] [address] [place] [address]        Equal Access to Services     SHANNON GREENE AH: Hadii aad ku hadasho Soomaali, waaxda luqadaha, qaybta kaalmada adeegyada, chi najera . So [facility] [phone].    ATENCIÓN: Si habla español, tiene a york disposición servicios gratuitos de asistencia lingüística. Llame al [phone].    We comply with applicable federal civil rights laws and [place] laws. We do not discriminate on the basis of race, color, national origin, age, disability sex, sexual orientation or gender identity.            Thank you!     Thank you for choosing [facility]  for your care. Our goal is always to provide you with excellent care. Hearing " back from our patients is one way we can continue to improve our services. Please take a few minutes to complete the written survey that you may receive in the mail after your visit with us. Thank you!             Your Updated Medication List - Protect others around you: Learn how to safely use, store and throw away your medicines at www.disposemymeds.org.          This list is accurate as of: 9/11/17 11:24 AM.  Always use your most recent med list.                   Brand Name Dispense Instructions for use Diagnosis    etanercept 50 MG/ML injection    ENBREL    4 Syringe    Inject 0.98 mLs (50 mg) Subcutaneous once a week Hold for signs of infection, and then seek medical attention.    Seropositive rheumatoid arthritis (H)       gabapentin 100 MG capsule    NEURONTIN     Take 100 mg by mouth 2 times daily as needed        ibuprofen 800 MG tablet    ADVIL/MOTRIN     Take 800 mg by mouth every 8 hours as needed for moderate pain        metFORMIN 500 MG 24 hr tablet    GLUCOPHAGE-XR     Take 500 mg by mouth 2 times daily (with meals)        OMEPRAZOLE PO      Take 20 mg by mouth every morning        ONDANSETRON PO      Take 8 mg by mouth every 8 hours as needed for nausea        sodium chloride 0.65 % nasal spray    OCEAN     Spray 2 sprays in nostril daily as needed for congestion

## 2017-09-11 NOTE — PROGRESS NOTES
Rheumatology Clinic Visit     Lesly Campbell MRN# [id number]   YOB: 1955 Age: 61 year old     Date of Visit: September 11th, 2017  Primary care provider: Janessa Farias          Assessment and Plan:   ASSESSMENT:     --. Seropositive rheumatoid arthritis, positive RF, positive CCP.   --. Elevated ALT.  History of abnormal liver enzymes in the past.   --  Fatty liver on abdominal ultrasound  -- Fatigue and morning stiffness.     -- Numbness and tingling in the fingertips.   --. Diabetes and neuropathy.    Ms. Campbell presented today for management of RA. She is on Enbrel 50 mg subcutaneous once a week for the last 2 months. Her joint pains have improved. She still has morning stiffness but it's better than before. She is off of prednisone now. She was given sulfasalazine before but could not tolerate it due to GI side effects. Her insurance is not able to cover Enbrel so we need to switch to another biologic agent which is covered. Will  contact her insurance company.    She has elevated LFTs mostly ALT since June of this year. Sulfasalazine use made it worse. She is off of sulfasalazine now for the past 2 months even then her liver function tests have not improved. The ultrasound showed Fatty infiltration of the liver. I will consider GI referral for further evaluation. Will get F actin antibody for autoimmune hepatitis.         Plan     -- Labs - F actin antibody. CBC, ESR, CRP, creatinine done today and normal. ALT level is elevated at 88.     --  GI referral for elevated LFTs evaluation.     -- Continue Enbrel 50 mg q. once a week. Will check with insurance company regarding alternative to Enbrel which is covered.    -- RTC in 3 months               Active Problem List:     Patient Active Problem List    Diagnosis Date Noted     Elevated LFTs 09/25/2017     Priority: Medium     Seropositive rheumatoid arthritis (H) 07/14/2017     Priority: Medium     Neuropathy (H) 07/14/2017     Priority: Medium      Pre-diabetes 06/22/2017     Priority: Medium     See telephone note and scanned documents.               History of Present Illness:   Lesly Campbell is a 61 year old female past medical history of rheumatoid arthritis, newly diagnosed diabetes, neuropathy seen in the clinic at consultation request of primary care Janessa Farias for evaluation and treatment of rheumatoid arthritis. She is coming today for follow up.     Interim History : 9/11/17 -  She is on Enbrel 50 mg SQ for 2 months. She denies any side effects with Enbrel use, no local injection site reaction. She has noticed improvement in joint pains and morning stiffness. She is not on prednisone. She occasionally uses ibuprofen. She does complain of fatigue. Denies any abdominal pain, change in appetite, weight loss or diarrhea.       Interim History - 7/5/17 - She is taking 5 mg prednisone. She has noticed significant improvement in joint pains especially in her hands, wrists and feet. Morning stiffness is better and reduced to less than 1 hour. She is not taking Tylenol at present. Takes ibuprofen 800 mg as needed basis, less frequent than before after start of prednisone. She still has numbness and tingling in her fingertips and heel and toes. She was recently diagnosed with diabetes and started metformin and gabapentin 100 mg.    Denies any new fever, chills, weight loss. Denies any raynauds, malar rash, photosensitivity, recurrent mouth/genital ulcers, sicca symptoms, pleuritic chest pains, recurrent sinusitis/rhinitis, swallowing difficulty, hearing or visual changes recently.     HPI from Initial consult  Ms. Campbell was diagnosed with rheumatoid arthritis in 2001.  She was followed by Dr. Jimenez in [facility] and later by Dr. Meade at [facility].  She has taken methotrexate 7 tablets in the past.  She did mention about taking self injectables as well as infusions but could not recall the name of her medications.  Due to loss of insurance she could  not take these medications and could not follow up with her rheumatologist.  Since 2010, she has not taken her methotrexate.  She follows up with a primary care at [facility] and her pain is managed with Tylenol 1000 mg and ibuprofen 800 mg on as needed basis.  According to her, she was doing fine since 2010, had no flareups but starting in 05/2017 she started complaining more pain in her hands and feet along with morning stiffness.  She also feels more fatigued and tired.  Her primary care did some labs including CBC, liver, kidney function and rheumatoid factor.  Her rheumatoid factor came back high at 54.  SILVINA was negative.  She had mild increase in ALT to 41.  Otherwise, had normal CRP and CBC.  Along with some mild stiffness and pain in her hands she also complains of a significant burning pain in her fingertips.  She was prescribed metformin and gabapentin by her primary care, but has not started taking these medications yet.  She was given a prednisone taper starting with 60 mg and tapering by 10 mg every 2 weeks to off.  She just finished her prednisone taper a few days ago.  Prednisone did help with her joint pains but it did not help with the numbness and tingling.  Plus, she did not like the side effects of prednisone.  She felt anxious and had sleep disturbances on it.              Review of Systems:   Review Of Systems  Constitutional: denies fever, chills, night sweats and weight loss.  Skin: No skin rash.  Eyes: No dryness or irritation in eyes. No episode of eye inflammation or redness.   Ears/Nose/Throat: no recurrent sinus infections.  Respiratory: No shortness of breath, dyspnea on exertion, cough, or hemoptysis  Cardiovascular: no chest pain or palpitations.  Gastrointestinal: no nausea, vomiting, abdominal pain.  Normal bowel movements.  Genitourinary: no dysuria, frequency  or hematuria.  Musculoskeletal: as in HPI  Neurologic: + numbness, tingling.  Psychiatric: no mood  disorders.  Hematologic/Lymphatic/Immunologic: no history of easy bruising, petechia or purpura.  No abnormal bleeding.   Endocrine: no h/o thyroid disease, + Diabetes.                  Past Medical History:[facility]     Past Medical History:   Diagnosis Date     Elevated LFTs      Epigastric abdominal pain      Myalgia      Myositis      Neuropathy (H)      Pharyngitis      Prediabetes      Rheumatoid arthritis (H)      Past Surgical History:   Procedure Laterality Date     NO HISTORY OF SURGERY              Social History:     Social History     Occupational History     Not on file.     Social History Main Topics     Smoking status: Former Smoker     Quit date: 2008     Smokeless tobacco: Not on file     Alcohol use Yes     Drug use: No     Sexual activity: Not on file            Family History:     Family History   Problem Relation Age of Onset     Cirrhosis Mother      Liver Cancer Mother             Allergies:     Allergies   Allergen Reactions     Prednisone      bloating     Sulfa Drugs GI Disturbance     Antibiotics caused GI upset, diarrhea, vomiting, and cramps            Medications:     Current Outpatient Prescriptions   Medication Sig Dispense Refill     etanercept (ENBREL) 50 MG/ML injection Inject 0.98 mLs (50 mg) Subcutaneous once a week Hold for signs of infection, and then seek medical attention. 4 Syringe 5     metFORMIN (GLUCOPHAGE-XR) 500 MG 24 hr tablet Take 500 mg by mouth 2 times daily (with meals)        OMEPRAZOLE PO Take 20 mg by mouth every morning       methocarbamol (ROBAXIN) 500 MG tablet Take 1 tablet (500 mg) by mouth 4 times daily as needed for muscle spasms 30 tablet 0     ibuprofen (ADVIL/MOTRIN) 800 MG tablet Take 800 mg by mouth every 8 hours as needed for moderate pain       sodium chloride (OCEAN) 0.65 % nasal spray Spray 2 sprays in nostril daily as needed for congestion       gabapentin (NEURONTIN) 100 MG capsule Take 100 mg by mouth 2 times daily as needed       ONDANSETRON PO  "Take 8 mg by mouth every 8 hours as needed for nausea              Physical Exam:   Blood pressure 124/77, pulse 93, temperature 98.2  F (36.8  C), temperature source Oral, resp. rate 16, height 1.549 m (5' 1\"), weight 73.8 kg (162 lb 11.2 oz), SpO2 97 %.  Wt Readings from Last 4 Encounters:   09/19/17 73.5 kg (162 lb)   09/11/17 73.8 kg (162 lb 11.2 oz)   07/05/17 (P) 70.9 kg (156 lb 3.2 oz)   06/14/17 70.9 kg (156 lb 3.2 oz)       Constitutional: well-developed, appearing stated age; cooperative  Eyes: nl EOM, PERRLA, vision, conjunctiva, sclera  ENT: nl external ears, nose, hearing, lips, teeth, gums, throat  No mucous membrane lesions, normal saliva pool  Neck: no mass or thyroid enlargement  Resp: lungs clear to auscultation, nl to palpation  CV: RRR, no murmurs, rubs or gallops, no edema  GI: no ABD mass or tenderness, no HSM  : not tested  Lymph: no cervical, supraclavicular, inguinal or epitrochlear nodes    MS: All TMJ, neck, shoulder, elbow, wrist, MCP/PIP/DIP, spine, hip, knee, ankle, and foot MTP/IP joints were examined.  --  No active synovitis or deformity present over MCP, PIP, wrists, elbows, shoulders bilaterally. Full ROM.  Normal  strength.   -- No tenderness on MTP squeeze. No synovitis tenderness over MTP and ankles. No knee effusions.  -- No dactylitis,  tenosynovitis, enthesopathy.    Skin: no nail pitting, alopecia, rash, nodules or lesions  Neuro: nl cranial nerves, strength, sensation, DTRs.   Psych: nl judgement, orientation, memory, affect.         Data:     Results for orders placed or performed in visit on 09/11/17   Creatinine   Result Value Ref Range    Creatinine 0.59 0.52 - 1.04 mg/dL    GFR Estimate >90 >60 mL/min/1.7m2    GFR Estimate If Black >90 >60 mL/min/1.7m2   Erythrocyte sedimentation rate auto   Result Value Ref Range    Sed Rate 9 0 - 30 mm/h   CBC with platelets differential   Result Value Ref Range    WBC 6.9 4.0 - 11.0 10e9/L    RBC Count 4.56 3.8 - 5.2 10e12/L "    Hemoglobin 13.7 11.7 - 15.7 g/dL    Hematocrit 42.0 35.0 - 47.0 %    MCV 92 78 - 100 fl    MCH 30.0 26.5 - 33.0 pg    MCHC 32.6 31.5 - 36.5 g/dL    RDW 14.4 10.0 - 15.0 %    Platelet Count 312 150 - 450 10e9/L    Diff Method Automated Method     % Neutrophils 56.6 %    % Lymphocytes 29.6 %    % Monocytes 8.5 %    % Eosinophils 4.0 %    % Basophils 1.3 %    Absolute Neutrophil 3.9 1.6 - 8.3 10e9/L    Absolute Lymphocytes 2.1 0.8 - 5.3 10e9/L    Absolute Monocytes 0.6 0.0 - 1.3 10e9/L    Absolute Eosinophils 0.3 0.0 - 0.7 10e9/L    Absolute Basophils 0.1 0.0 - 0.2 10e9/L   CRP inflammation   Result Value Ref Range    CRP Inflammation <2.9 0.0 - 8.0 mg/L   Hepatic panel   Result Value Ref Range    Bilirubin Direct <0.1 0.0 - 0.2 mg/dL    Bilirubin Total 0.3 0.2 - 1.3 mg/dL    Albumin 3.8 3.4 - 5.0 g/dL    Protein Total 7.4 6.8 - 8.8 g/dL    Alkaline Phosphatase 63 40 - 150 U/L    ALT 88 (H) 0 - 50 U/L    AST 43 0 - 45 U/L       Recent Labs   Lab Test  06/14/17   1215   WBC  7.1   RBC  4.37   HGB  13.2   HCT  40.3   MCV  92   RDW  14.7   PLT  283   ALBUMIN  3.6   CRP  9.7*      Recent Labs   Lab Test  06/14/17   1215   TSH  0.98     Hemoglobin   Date Value Ref Range Status   09/11/2017 13.7 11.7 - 15.7 g/dL Final   07/28/2017 13.4 11.7 - 15.7 g/dL Final   06/14/2017 13.2 11.7 - 15.7 g/dL Final     Sed Rate   Date Value Ref Range Status   09/11/2017 9 0 - 30 mm/h Final   07/05/2017 12 0 - 30 mm/h Final   06/14/2017 29 0 - 30 mm/h Final     CRP Inflammation   Date Value Ref Range Status   09/11/2017 <2.9 0.0 - 8.0 mg/L Final   07/05/2017 <2.9 0.0 - 8.0 mg/L Final   06/14/2017 9.7 (H) 0.0 - 8.0 mg/L Final     AST   Date Value Ref Range Status   09/11/2017 43 0 - 45 U/L Final   08/09/2017 32 0 - 45 U/L Final   07/28/2017 46 (H) 0 - 45 U/L Final     Albumin   Date Value Ref Range Status   09/11/2017 3.8 3.4 - 5.0 g/dL Final   08/09/2017 3.7 3.4 - 5.0 g/dL Final   07/28/2017 3.8 3.4 - 5.0 g/dL Final     Alkaline  Phosphatase   Date Value Ref Range Status   09/11/2017 63 40 - 150 U/L Final   08/09/2017 57 40 - 150 U/L Final   07/28/2017 64 40 - 150 U/L Final     ALT   Date Value Ref Range Status   09/11/2017 88 (H) 0 - 50 U/L Final   08/09/2017 65 (H) 0 - 50 U/L Final   07/28/2017 71 (H) 0 - 50 U/L Final     Recent Labs   Lab Test  09/11/17   1035  08/09/17   0927  07/28/17   1158   06/14/17   1215   WBC  6.9   --   7.6   --   7.1   HGB  13.7   --   13.4   --   13.2   HCT  42.0   --   41.9   --   40.3   MCV  92   --   93   --   92   PLT  312   --   318   --   283   TSH   --    --    --    --   0.98   AST  43  32  46*   < >  45   ALT  88*  65*  71*   < >  63*   ALKPHOS  63  57  64   < >  69    < > = values in this interval not displayed.     Other studies:   X-ray hands  FINDINGS: AP, oblique, and lateral views of the bilateral hands and  wrists were obtained. Bones are well aligned. Joint spaces are  well-maintained. No erosive changes. No displaced fractures. No soft  tissue abnormalities.         IMPRESSION: No erosive changes in the bilateral hands or wrists.    Outside studies reviewed:   Primary care at [facility] office labs done on 06/06/2017 showed creatinine of 0.7, BUN 15, AST 21, ALT raised to 44.  Lyme screen done, not back yet.  Rheumatoid factor high 54.  CRP was normal 0.13, SILVINA negative.  Hepatitis C is negative.  CBC in 05/2017 which showed normal hemoglobin.  No leukopenia.  Past labs in 2010, she had a CCP antibody which was high at 34.  rheumatoid factor was 16.     Reviewed Rheumatology lab flowsheet    Aida Meza MD  [facility]   Pager - [phone]

## 2017-09-11 NOTE — LETTER
9/11/2017      RE: Lesly Campbell  [address] [facility] [address]       Rheumatology Clinic Visit     Lesly Campbell MRN# [id number]   YOB: 1955 Age: 61 year old     Date of Visit: September 11th, 2017  Primary care provider: Janessa Farias          Assessment and Plan:   ASSESSMENT:     --. Seropositive rheumatoid arthritis, positive RF, positive CCP.   --. Elevated ALT.  History of abnormal liver enzymes in the past.   --  Fatty liver on abdominal ultrasound  -- Fatigue and morning stiffness.     -- Numbness and tingling in the fingertips.   --. Diabetes and neuropathy.    Ms. Campbell presented today for management of RA. She is on Enbrel 50 mg subcutaneous once a week for the last 2 months. Her joint pains have improved. She still has morning stiffness but it's better than before. She is off of prednisone now. She was given sulfasalazine before but could not tolerate it due to GI side effects. Her insurance is not able to cover Enbrel so we need to switch to another biologic agent which is covered. Will  contact her insurance company.    She has elevated LFTs mostly ALT since June of this year. Sulfasalazine use made it worse. She is off of sulfasalazine now for the past 2 months even then her liver function tests have not improved. The ultrasound showed Fatty infiltration of the liver. I will consider GI referral for further evaluation. Will get F actin antibody for autoimmune hepatitis.         Plan     -- Labs - F actin antibody. CBC, ESR, CRP, creatinine done today and normal. ALT level is elevated at 88.     --  GI referral for elevated LFTs evaluation.     -- Continue Enbrel 50 mg q. once a week. Will check with insurance company regarding alternative to Enbrel which is covered.    -- RTC in 3 months               Active Problem List:     Patient Active Problem List    Diagnosis Date Noted     Elevated LFTs 09/25/2017     Priority: Medium     Seropositive rheumatoid  arthritis (H) 07/14/2017     Priority: Medium     Neuropathy (H) 07/14/2017     Priority: Medium     Pre-diabetes 06/22/2017     Priority: Medium     See telephone note and scanned documents.               History of Present Illness:   Lesly Campbell is a 61 year old female past medical history of rheumatoid arthritis, newly diagnosed diabetes, neuropathy seen in the clinic at consultation request of primary care Janessa Farias for evaluation and treatment of rheumatoid arthritis. She is coming today for follow up.     Interim History : 9/11/17 -  She is on Enbrel 50 mg SQ for 2 months. She denies any side effects with Enbrel use, no local injection site reaction. She has noticed improvement in joint pains and morning stiffness. She is not on prednisone. She occasionally uses ibuprofen. She does complain of fatigue. Denies any abdominal pain, change in appetite, weight loss or diarrhea.       Interim History - 7/5/17 - She is taking 5 mg prednisone. She has noticed significant improvement in joint pains especially in her hands, wrists and feet. Morning stiffness is better and reduced to less than 1 hour. She is not taking Tylenol at present. Takes ibuprofen 800 mg as needed basis, less frequent than before after start of prednisone. She still has numbness and tingling in her fingertips and heel and toes. She was recently diagnosed with diabetes and started metformin and gabapentin 100 mg.    Denies any new fever, chills, weight loss. Denies any raynauds, malar rash, photosensitivity, recurrent mouth/genital ulcers, sicca symptoms, pleuritic chest pains, recurrent sinusitis/rhinitis, swallowing difficulty, hearing or visual changes recently.     HPI from Initial consult  Ms. Campbell was diagnosed with rheumatoid arthritis in 2001.  She was followed by Dr. Jimenez in [facility] and later by Dr. Meade at [facility].  She has taken methotrexate 7 tablets in the past.  She did mention about taking self injectables as well  as infusions but could not recall the name of her medications.  Due to loss of insurance she could not take these medications and could not follow up with her rheumatologist.  Since 2010, she has not taken her methotrexate.  She follows up with a primary care at [facility] and her pain is managed with Tylenol 1000 mg and ibuprofen 800 mg on as needed basis.  According to her, she was doing fine since 2010, had no flareups but starting in 05/2017 she started complaining more pain in her hands and feet along with morning stiffness.  She also feels more fatigued and tired.  Her primary care did some labs including CBC, liver, kidney function and rheumatoid factor.  Her rheumatoid factor came back high at 54.  SILVINA was negative.  She had mild increase in ALT to 41.  Otherwise, had normal CRP and CBC.  Along with some mild stiffness and pain in her hands she also complains of a significant burning pain in her fingertips.  She was prescribed metformin and gabapentin by her primary care, but has not started taking these medications yet.  She was given a prednisone taper starting with 60 mg and tapering by 10 mg every 2 weeks to off.  She just finished her prednisone taper a few days ago.  Prednisone did help with her joint pains but it did not help with the numbness and tingling.  Plus, she did not like the side effects of prednisone.  She felt anxious and had sleep disturbances on it.              Review of Systems:   Review Of Systems  Constitutional: denies fever, chills, night sweats and weight loss.  Skin: No skin rash.  Eyes: No dryness or irritation in eyes. No episode of eye inflammation or redness.   Ears/Nose/Throat: no recurrent sinus infections.  Respiratory: No shortness of breath, dyspnea on exertion, cough, or hemoptysis  Cardiovascular: no chest pain or palpitations.  Gastrointestinal: no nausea, vomiting, abdominal pain.  Normal bowel movements.  Genitourinary: no dysuria, frequency   or hematuria.  Musculoskeletal: as in HPI  Neurologic: + numbness, tingling.  Psychiatric: no mood disorders.  Hematologic/Lymphatic/Immunologic: no history of easy bruising, petechia or purpura.  No abnormal bleeding.   Endocrine: no h/o thyroid disease, + Diabetes.                  Past Medical History:who     Past Medical History:   Diagnosis Date     Elevated LFTs      Epigastric abdominal pain      Myalgia      Myositis      Neuropathy (H)      Pharyngitis      Prediabetes      Rheumatoid arthritis (H)      Past Surgical History:   Procedure Laterality Date     NO HISTORY OF SURGERY              Social History:     Social History     Occupational History     Not on file.     Social History Main Topics     Smoking status: Former Smoker     Quit date: 2008     Smokeless tobacco: Not on file     Alcohol use Yes     Drug use: No     Sexual activity: Not on file            Family History:     Family History   Problem Relation Age of Onset     Cirrhosis Mother      Liver Cancer Mother             Allergies:     Allergies   Allergen Reactions     Prednisone      bloating     Sulfa Drugs GI Disturbance     Antibiotics caused GI upset, diarrhea, vomiting, and cramps            Medications:     Current Outpatient Prescriptions   Medication Sig Dispense Refill     etanercept (ENBREL) 50 MG/ML injection Inject 0.98 mLs (50 mg) Subcutaneous once a week Hold for signs of infection, and then seek medical attention. 4 Syringe 5     metFORMIN (GLUCOPHAGE-XR) 500 MG 24 hr tablet Take 500 mg by mouth 2 times daily (with meals)        OMEPRAZOLE PO Take 20 mg by mouth every morning       methocarbamol (ROBAXIN) 500 MG tablet Take 1 tablet (500 mg) by mouth 4 times daily as needed for muscle spasms 30 tablet 0     ibuprofen (ADVIL/MOTRIN) 800 MG tablet Take 800 mg by mouth every 8 hours as needed for moderate pain       sodium chloride (OCEAN) 0.65 % nasal spray Spray 2 sprays in nostril daily as needed for congestion        "gabapentin (NEURONTIN) 100 MG capsule Take 100 mg by mouth 2 times daily as needed       ONDANSETRON PO Take 8 mg by mouth every 8 hours as needed for nausea              Physical Exam:   Blood pressure 124/77, pulse 93, temperature 98.2  F (36.8  C), temperature source Oral, resp. rate 16, height 1.549 m (5' 1\"), weight 73.8 kg (162 lb 11.2 oz), SpO2 97 %.  Wt Readings from Last 4 Encounters:   09/19/17 73.5 kg (162 lb)   09/11/17 73.8 kg (162 lb 11.2 oz)   07/05/17 (P) 70.9 kg (156 lb 3.2 oz)   06/14/17 70.9 kg (156 lb 3.2 oz)       Constitutional: well-developed, appearing stated age; cooperative  Eyes: nl EOM, PERRLA, vision, conjunctiva, sclera  ENT: nl external ears, nose, hearing, lips, teeth, gums, throat  No mucous membrane lesions, normal saliva pool  Neck: no mass or thyroid enlargement  Resp: lungs clear to auscultation, nl to palpation  CV: RRR, no murmurs, rubs or gallops, no edema  GI: no ABD mass or tenderness, no HSM  : not tested  Lymph: no cervical, supraclavicular, inguinal or epitrochlear nodes    MS: All TMJ, neck, shoulder, elbow, wrist, MCP/PIP/DIP, spine, hip, knee, ankle, and foot MTP/IP joints were examined.  --  No active synovitis or deformity present over MCP, PIP, wrists, elbows, shoulders bilaterally. Full ROM.  Normal  strength.   -- No tenderness on MTP squeeze. No synovitis tenderness over MTP and ankles. No knee effusions.  -- No dactylitis,  tenosynovitis, enthesopathy.    Skin: no nail pitting, alopecia, rash, nodules or lesions  Neuro: nl cranial nerves, strength, sensation, DTRs.   Psych: nl judgement, orientation, memory, affect.         Data:     Results for orders placed or performed in visit on 09/11/17   Creatinine   Result Value Ref Range    Creatinine 0.59 0.52 - 1.04 mg/dL    GFR Estimate >90 >60 mL/min/1.7m2    GFR Estimate If Black >90 >60 mL/min/1.7m2   Erythrocyte sedimentation rate auto   Result Value Ref Range    Sed Rate 9 0 - 30 mm/h   CBC with platelets " differential   Result Value Ref Range    WBC 6.9 4.0 - 11.0 10e9/L    RBC Count 4.56 3.8 - 5.2 10e12/L    Hemoglobin 13.7 11.7 - 15.7 g/dL    Hematocrit 42.0 35.0 - 47.0 %    MCV 92 78 - 100 fl    MCH 30.0 26.5 - 33.0 pg    MCHC 32.6 31.5 - 36.5 g/dL    RDW 14.4 10.0 - 15.0 %    Platelet Count 312 150 - 450 10e9/L    Diff Method Automated Method     % Neutrophils 56.6 %    % Lymphocytes 29.6 %    % Monocytes 8.5 %    % Eosinophils 4.0 %    % Basophils 1.3 %    Absolute Neutrophil 3.9 1.6 - 8.3 10e9/L    Absolute Lymphocytes 2.1 0.8 - 5.3 10e9/L    Absolute Monocytes 0.6 0.0 - 1.3 10e9/L    Absolute Eosinophils 0.3 0.0 - 0.7 10e9/L    Absolute Basophils 0.1 0.0 - 0.2 10e9/L   CRP inflammation   Result Value Ref Range    CRP Inflammation <2.9 0.0 - 8.0 mg/L   Hepatic panel   Result Value Ref Range    Bilirubin Direct <0.1 0.0 - 0.2 mg/dL    Bilirubin Total 0.3 0.2 - 1.3 mg/dL    Albumin 3.8 3.4 - 5.0 g/dL    Protein Total 7.4 6.8 - 8.8 g/dL    Alkaline Phosphatase 63 40 - 150 U/L    ALT 88 (H) 0 - 50 U/L    AST 43 0 - 45 U/L       Recent Labs   Lab Test  06/14/17   1215   WBC  7.1   RBC  4.37   HGB  13.2   HCT  40.3   MCV  92   RDW  14.7   PLT  283   ALBUMIN  3.6   CRP  9.7*      Recent Labs   Lab Test  06/14/17   1215   TSH  0.98     Hemoglobin   Date Value Ref Range Status   09/11/2017 13.7 11.7 - 15.7 g/dL Final   07/28/2017 13.4 11.7 - 15.7 g/dL Final   06/14/2017 13.2 11.7 - 15.7 g/dL Final     Sed Rate   Date Value Ref Range Status   09/11/2017 9 0 - 30 mm/h Final   07/05/2017 12 0 - 30 mm/h Final   06/14/2017 29 0 - 30 mm/h Final     CRP Inflammation   Date Value Ref Range Status   09/11/2017 <2.9 0.0 - 8.0 mg/L Final   07/05/2017 <2.9 0.0 - 8.0 mg/L Final   06/14/2017 9.7 (H) 0.0 - 8.0 mg/L Final     AST   Date Value Ref Range Status   09/11/2017 43 0 - 45 U/L Final   08/09/2017 32 0 - 45 U/L Final   07/28/2017 46 (H) 0 - 45 U/L Final     Albumin   Date Value Ref Range Status   09/11/2017 3.8 3.4 - 5.0 g/dL  Final   08/09/2017 3.7 3.4 - 5.0 g/dL Final   07/28/2017 3.8 3.4 - 5.0 g/dL Final     Alkaline Phosphatase   Date Value Ref Range Status   09/11/2017 63 40 - 150 U/L Final   08/09/2017 57 40 - 150 U/L Final   07/28/2017 64 40 - 150 U/L Final     ALT   Date Value Ref Range Status   09/11/2017 88 (H) 0 - 50 U/L Final   08/09/2017 65 (H) 0 - 50 U/L Final   07/28/2017 71 (H) 0 - 50 U/L Final     Recent Labs   Lab Test  09/11/17   1035  08/09/17   0927  07/28/17   1158   06/14/17   1215   WBC  6.9   --   7.6   --   7.1   HGB  13.7   --   13.4   --   13.2   HCT  42.0   --   41.9   --   40.3   MCV  92   --   93   --   92   PLT  312   --   318   --   283   TSH   --    --    --    --   0.98   AST  43  32  46*   < >  45   ALT  88*  65*  71*   < >  63*   ALKPHOS  63  57  64   < >  69    < > = values in this interval not displayed.     Other studies:   X-ray hands  FINDINGS: AP, oblique, and lateral views of the bilateral hands and  wrists were obtained. Bones are well aligned. Joint spaces are  well-maintained. No erosive changes. No displaced fractures. No soft  tissue abnormalities.         IMPRESSION: No erosive changes in the bilateral hands or wrists.    Outside studies reviewed:   Primary care at [facility] office labs done on 06/06/2017 showed creatinine of 0.7, BUN 15, AST 21, ALT raised to 44.  Lyme screen done, not back yet.  Rheumatoid factor high 54.  CRP was normal 0.13, SILVINA negative.  Hepatitis C is negative.  CBC in 05/2017 which showed normal hemoglobin.  No leukopenia.  Past labs in 2010, she had a CCP antibody which was high at 34.  rheumatoid factor was 16.     Reviewed Rheumatology lab flowsheet    Aida Meza MD  [facility]   Pager - [phone]

## 2017-09-11 NOTE — TELEPHONE ENCOUNTER
[facility] Prior Authorization Team   Phone: [phone]  Fax: [phone]    PA Initiation    Medication: Enbrel Injectable 50 mg  Insurance Company: [employer] - Phone [phone] Fax [phone]  Pharmacy Filling the Rx: [facility] - [place], [place] - [address]  Filling Pharmacy Phone: [phone]  Filling Pharmacy Fax: [phone]  Start Date: 9/11/2017    Waiting for questions to generate in order to complete prior authorization initiation.    Note: Enbrel was delivered to patient's home on 8/30/17, patient received 28 day supply. This is a proactive request as previous claim paid through patient's [facility] insurance as a transition fill.

## 2017-09-11 NOTE — PATIENT INSTRUCTIONS
-- Your liver function tests is still high    -- I will do tests for autoimmune hepatitis     -- Will give hepatology referral     -- Continue Enbrel , will change depending upon insurance coverage

## 2017-09-11 NOTE — NURSING NOTE
"Lesly Campbell's goals for this visit include:   Chief Complaint   Patient presents with     RECHECK     Follow up RA       She requests these members of her care team be copied on today's visit information: PCP    PCP: Janessa Farias    Referring Provider:  No referring provider defined for this encounter.    Chief Complaint   Patient presents with     RECHECK     Follow up RA       Initial /77 (BP Location: Left arm, Patient Position: Chair, Cuff Size: Adult Regular)  Pulse 93  Temp 98.2  F (36.8  C) (Oral)  Resp 16  Ht 1.549 m (5' 1\")  Wt 73.8 kg (162 lb 11.2 oz)  SpO2 97%  BMI 30.74 kg/m2 Estimated body mass index is 30.74 kg/(m^2) as calculated from the following:    Height as of this encounter: 1.549 m (5' 1\").    Weight as of this encounter: 73.8 kg (162 lb 11.2 oz).  Medication Reconciliation: complete       Medication Refills: none      Baylee Goldman CMA        "

## 2017-09-11 NOTE — Clinical Note
Dr. Meza, we have to write a letter of medical necessity to continue to use Enbrel.     Please review and edit. Plus, [facility] is requesting a signature with letter.    RYAN StephensN, RN, PHN  Rheumatology Care Coordinator  [facility]

## 2017-09-12 DIAGNOSIS — R74.8 LIVER ENZYME ELEVATION: ICD-10-CM

## 2017-09-12 PROCEDURE — 36415 COLL VENOUS BLD VENIPUNCTURE: CPT | Performed by: STUDENT IN AN ORGANIZED HEALTH CARE EDUCATION/TRAINING PROGRAM

## 2017-09-12 PROCEDURE — 83516 IMMUNOASSAY NONANTIBODY: CPT | Mod: 90 | Performed by: STUDENT IN AN ORGANIZED HEALTH CARE EDUCATION/TRAINING PROGRAM

## 2017-09-12 PROCEDURE — 99000 SPECIMEN HANDLING OFFICE-LAB: CPT | Performed by: STUDENT IN AN ORGANIZED HEALTH CARE EDUCATION/TRAINING PROGRAM

## 2017-09-12 NOTE — TELEPHONE ENCOUNTER
PRIOR AUTHORIZATION DENIED    Medication: Enbrel Injectable 50 mg     Denial Date: 7/31/2017    Denial Rational: Received notification from [employer] that prior authorization request for Enbrel was denied on 7/31/17, new request must be submitted as an appeal.    Appeal Information: Appeals can be faxed to [facility] at [phone].

## 2017-09-14 LAB — SMA IGG SER-ACNC: 5 UNITS (ref 0–19)

## 2017-09-14 NOTE — TELEPHONE ENCOUNTER
I spoke to Ksenia at [facility], verified that Enbrel appeal has not yet been received for this patient. The correct fax number is [phone] for appeals. I manually re-faxed Enbrel appeal to [facility] for review. Phone number to check on appeal status is [phone].    [facility] Prior Authorization Team   Phone: [phone]  Fax: [phone]

## 2017-09-15 NOTE — TELEPHONE ENCOUNTER
Received notification back from [place] stating that they received the appeal request for Enbrel injection for Lesly but it is invalid because it did not include an appeal statement with the provider name signature. Please provide a signed letter of medical necessity with physicians signature so we can submit appeal.    [facility] Prior Authorization Team   Phone: [phone]  Fax: [phone]

## 2017-09-19 ENCOUNTER — OFFICE VISIT (OUTPATIENT)
Dept: URGENT CARE | Facility: URGENT CARE | Age: 62
End: 2017-09-19
Payer: COMMERCIAL

## 2017-09-19 VITALS
SYSTOLIC BLOOD PRESSURE: 141 MMHG | HEART RATE: 59 BPM | BODY MASS INDEX: 30.61 KG/M2 | WEIGHT: 162 LBS | DIASTOLIC BLOOD PRESSURE: 76 MMHG | TEMPERATURE: 97.1 F | OXYGEN SATURATION: 98 %

## 2017-09-19 DIAGNOSIS — R20.2 PARESTHESIA OF RIGHT UPPER EXTREMITY: ICD-10-CM

## 2017-09-19 DIAGNOSIS — M54.16 LUMBAR RADICULOPATHY: Primary | ICD-10-CM

## 2017-09-19 PROCEDURE — 99203 OFFICE O/P NEW LOW 30 MIN: CPT | Performed by: PHYSICIAN ASSISTANT

## 2017-09-19 RX ORDER — METHOCARBAMOL 500 MG/1
500 TABLET, FILM COATED ORAL 4 TIMES DAILY PRN
Qty: 30 TABLET | Refills: 0 | Status: SHIPPED | OUTPATIENT
Start: 2017-09-19 | End: 2017-11-30

## 2017-09-19 ASSESSMENT — ENCOUNTER SYMPTOMS
EYE PAIN: 0
CONSTITUTIONAL NEGATIVE: 1
DIAPHORESIS: 0
WEIGHT LOSS: 0
TINGLING: 1
COUGH: 0
RESPIRATORY NEGATIVE: 1
HEMOPTYSIS: 0
FEVER: 0
CARDIOVASCULAR NEGATIVE: 1
PALPITATIONS: 0

## 2017-09-19 NOTE — NURSING NOTE
"Chief Complaint   Patient presents with     Back Pain     X 1 day     Musculoskeletal Problem     this morning, right arm       Initial /76  Pulse 59  Temp 97.1  F (36.2  C) (Oral)  Wt 162 lb (73.5 kg)  SpO2 98%  BMI 30.61 kg/m2 Estimated body mass index is 30.61 kg/(m^2) as calculated from the following:    Height as of 9/11/17: 5' 1\" (1.549 m).    Weight as of this encounter: 162 lb (73.5 kg).  Medication Reconciliation: complete   Jane Mayorga CMA      "

## 2017-09-19 NOTE — PATIENT INSTRUCTIONS
Restart the gabapentin for numbness and tingling.    Continue with the ibuprofen for back pain.    Check with primary care doctor about EMG test and MRI of the low back.

## 2017-09-19 NOTE — MR AVS SNAPSHOT
After Visit Summary   9/19/2017    Lesly Campbell    MRN: [id number]           Patient Information     Date Of Birth          1955        Visit Information        Provider Department      9/19/2017 5:00 PM Sakina Bustillo PA-C [facility]        Today's Diagnoses     Lumbar radiculopathy    -  1    Paresthesia of right upper extremity          Care Instructions    Restart the gabapentin for numbness and tingling.    Continue with the ibuprofen for back pain.    Check with primary care doctor about EMG test and MRI of the low back.          Follow-ups after your visit        Your next 10 appointments already scheduled     Dec 11, 2017 11:00 AM CST   [facility] Visit with Aida [facility]za MD   [facility]    [address] [place] [id number] [phone]            Dec 28, 2017  1:40 PM CST   New Visit with Darryl Ibarra MD   [facility]    [address] [place] [id number] [phone]              Who to contact     If you have questions or need follow up information about today's clinic visit or your schedule please contact [facility] directly at [phone].  Normal or non-critical lab and imaging results will be communicated to you by MyChart, letter or phone within 4 business days after the clinic has received the results. If you do not hear from us within 7 days, please contact the clinic through [employer] or phone. If you have a critical or abnormal lab result, we will notify you by phone as soon as possible.  Submit refill requests through [employer] or call your pharmacy and they will forward the refill request to us. Please allow 3 business days for your refill to be completed.          Additional Information About Your Visit        [employer] Information     [employer] lets you send messages to your doctor, view your test results, renew your  "prescriptions, schedule appointments and more. To sign up, go to [email or web address] . Click on \"Log in\" on the left side of the screen, which will take you to the Welcome page. Then click on \"Sign up Now\" on the right side of the page.     You will be asked to enter the access code listed below, as well as some personal information. Please follow the directions to create your username and password.     Your access code is: [email or web address]  Expires: 2017  5:45 PM     Your access code will  in 90 days. If you need help or a new code, please call your [facility] or [phone].        Care EveryWhere ID     This is your Care EveryWhere ID. This could be used by other organizations to access your [facility] medical records  [id number]        Your Vitals Were     Pulse Temperature Pulse Oximetry BMI (Body Mass Index)          59 97.1  F (36.2  C) (Oral) 98% 30.61 kg/m2         Blood Pressure from Last 3 Encounters:   17 141/76   17 124/77   17 (P) 126/78    Weight from Last 3 Encounters:   17 162 lb (73.5 kg)   17 162 lb 11.2 oz (73.8 kg)   17 (P) 156 lb 3.2 oz (70.9 kg)              Today, you had the following     No orders found for display         Today's Medication Changes          These changes are accurate as of: 17  5:45 PM.  If you have any questions, ask your nurse or doctor.               Start taking these medicines.        Dose/Directions    methocarbamol 500 MG tablet   Commonly known as:  ROBAXIN   Used for:  Lumbar radiculopathy   Started by:  Sakina Bustillo PA-C        Dose:  500 mg   Take 1 tablet (500 mg) by mouth 4 times daily as needed for muscle spasms   Quantity:  30 tablet   Refills:  0            Where to get your medicines      These medications were sent to [facility] [address], [address]  [address] [address], [place] [address], [facility] [address]     Phone:  [phone]     methocarbamol 500 MG tablet    "             Primary Care Provider Office Phone # Fax #    Janessa FariasSHEN [phone]        [facility] [address] [address]        Equal Access to Services     SHANNON GREENE : Hadii lauro rivera joyceo Sodakotaali, waaxda luqadaha, qaybta kaalmada adelarsda, chi mabry laGracielatalia falcon. So [facility] [phone].    ATENCIÓN: Si habla español, tiene a york disposición servicios gratuitos de asistencia lingüística. Llame al [phone].    We comply with applicable federal civil rights laws and [place] laws. We do not discriminate on the basis of race, color, national origin, age, disability sex, sexual orientation or gender identity.            Thank you!     Thank you for choosing [facility]  for your [facility]. Our goal is always to provide you with excellent care. Hearing back from our patients is one way we can continue to improve our services. Please take a few minutes to complete the written survey that you may receive in the mail after your visit with us. Thank you!             Your Updated Medication List - Protect others around you: Learn how to safely use, store and throw away your medicines at www.disposemymeds.org.          This list is accurate as of: 9/19/17  5:45 PM.  Always use your most recent med list.                   Brand Name Dispense Instructions for use Diagnosis    etanercept 50 MG/ML injection    ENBREL    4 Syringe    Inject 0.98 mLs (50 mg) Subcutaneous once a week Hold for signs of infection, and then seek medical attention.    Seropositive rheumatoid arthritis (H)       gabapentin 100 MG capsule    NEURONTIN     Take 100 mg by mouth 2 times daily as needed        ibuprofen 800 MG tablet    ADVIL/MOTRIN     Take 800 mg by mouth every 8 hours as needed for moderate pain        metFORMIN 500 MG 24 hr tablet    GLUCOPHAGE-XR     Take 500 mg by mouth 2 times daily (with meals)        methocarbamol 500 MG tablet    ROBAXIN    30  tablet    Take 1 tablet (500 mg) by mouth 4 times daily as needed for muscle spasms    Lumbar radiculopathy       OMEPRAZOLE PO      Take 20 mg by mouth every morning        ONDANSETRON PO      Take 8 mg by mouth every 8 hours as needed for nausea        sodium chloride 0.65 % nasal spray    OCEAN     Spray 2 sprays in nostril daily as needed for congestion

## 2017-09-19 NOTE — PROGRESS NOTES
SUBJECTIVE:                                                      HPI  Lesly Campbell is a 61 year old female who presents to clinic today for the following health issues:  Musculoskeletal problem/pain    Duration: X 1 day    Description  Location: right arm, no radicular pain  low back pain radiating to B thighs    Intensity:  moderate    Accompanying signs and symptoms:  Also reports numbness and tingling but no weakness.  No bladder or bowel dysfunction.  No swelling, redness, drainage or fevers.  No trauma or injuries.      History  Previous similar problem: has known hx of neuropathy which was thought to have been due to her prediabetes.  Symptoms feel similar to this.  Has not had any other workup done.  Was previously on gabapentin in the past.  Also reports hx of RA and currently on enbrel for this.    Previous evaluation:  none    Precipitating or alleviating factors:  Trauma or overuse: no   Aggravating factors include: walking and use and is relieved with rest.    Therapies tried and outcome: ibuprofen 800mg without any relief      Reviewed PMH, SOH and FMH  Patient Active Problem List   Diagnosis     Pre-diabetes     Seropositive rheumatoid arthritis (H)     Neuropathy (H)     Current Outpatient Prescriptions   Medication Sig Dispense Refill     etanercept (ENBREL) 50 MG/ML injection Inject 0.98 mLs (50 mg) Subcutaneous once a week Hold for signs of infection, and then seek medical attention. 4 Syringe 5     ibuprofen (ADVIL/MOTRIN) 800 MG tablet Take 800 mg by mouth every 8 hours as needed for moderate pain       sodium chloride (OCEAN) 0.65 % nasal spray Spray 2 sprays in nostril daily as needed for congestion       metFORMIN (GLUCOPHAGE-XR) 500 MG 24 hr tablet Take 500 mg by mouth 2 times daily (with meals)        gabapentin (NEURONTIN) 100 MG capsule Take 100 mg by mouth 2 times daily as needed       ONDANSETRON PO Take 8 mg by mouth every 8 hours as needed for nausea       OMEPRAZOLE PO Take  20 mg by mouth every morning       Allergies   Allergen Reactions     Prednisone      bloating     Sulfa Drugs GI Disturbance     Antibiotics caused GI upset, diarrhea, vomiting, and cramps       Review of Systems   Constitutional: Negative.  Negative for diaphoresis, fever and weight loss.   HENT: Negative.    Eyes: Negative for pain.   Respiratory: Negative.  Negative for cough and hemoptysis.    Cardiovascular: Negative.  Negative for chest pain and palpitations.   Musculoskeletal: Positive for joint pain.   Skin: Negative.    Neurological: Positive for tingling.   Endo/Heme/Allergies: Negative for environmental allergies.   All other systems reviewed and are negative.      Physical Exam   Constitutional: She is oriented to person, place, and time and well-developed, well-nourished, and in no distress. No distress.   Cardiovascular: Normal rate, regular rhythm, normal heart sounds and intact distal pulses.  Exam reveals no gallop and no friction rub.    No murmur heard.  Pulmonary/Chest: Effort normal and breath sounds normal. No respiratory distress. She has no wheezes. She has no rales.   Musculoskeletal:        Cervical back: Normal. She exhibits normal range of motion, no tenderness, no bony tenderness and no spasm.        Lumbar back: She exhibits tenderness and spasm. She exhibits normal range of motion, no bony tenderness, no swelling, no edema, no deformity, no laceration and normal pulse.        Right upper arm: Normal. She exhibits no tenderness, no bony tenderness, no swelling, no edema, no deformity and no laceration.   Negative phalens and tinnels sign.   Neurological: She is alert and oriented to person, place, and time. She has normal motor skills, normal sensation, normal strength and normal reflexes. She has a normal Straight Leg Raise Test. Gait normal. Gait normal.   Able to walk on toes and heels without difficulty.   Skin: Skin is warm, dry and intact.   Distal pulses are 2+ and symmetric.   No peripheral edema.   Psychiatric: Mood, memory, affect and judgment normal.   Nursing note and vitals reviewed.        Assessment/Plan:  Lumbar radiculopathy:  ?disk herniation vs nerve root impingement vs diabetic neuropathy.  No red flags.  Will give trial of methocarbamol as directed.  And have her continue with the ibuprofen and gabapentin.  She has declined prednisone due to side effects.  Discussed risks and benefits of medication along with side effects, direction for use.  Avoid driving or operating machinery due to sedation.  Recheck with PCP if symptoms worsen or if symptoms do not improve. May need further workup with MRI and EMG testing.  -     methocarbamol (ROBAXIN) 500 MG tablet; Take 1 tablet (500 mg) by mouth 4 times daily as needed for muscle spasms    Paresthesia of right upper extremity:  Same treatment as above.  ?disk herniation vs nerve root impingement/entrapment.  F/u with PCP regarding abnormal             Sakina See NARA Bustillo

## 2017-09-21 ENCOUNTER — TRANSFERRED RECORDS (OUTPATIENT)
Dept: HEALTH INFORMATION MANAGEMENT | Facility: CLINIC | Age: 62
End: 2017-09-21

## 2017-09-25 PROBLEM — R79.89 ELEVATED LFTS: Status: ACTIVE | Noted: 2017-09-25

## 2017-09-25 NOTE — TELEPHONE ENCOUNTER
Letter completed and signed. Faxed to Sakina Lopez with PA team at [phone], fax confirmation received.    Lizet Adame, RYANN, RN, PHN  Rheumatology Care Coordinator  [facility]

## 2017-09-25 NOTE — TELEPHONE ENCOUNTER
Appeal letter set up. Routed to provider for review and signature.    Lizet Adame, RYANN, RN, PHN  Rheumatology Care Coordinator  [facility]

## 2017-09-27 NOTE — TELEPHONE ENCOUNTER
Medication Appeal Initiation    We have initiated an appeal for the requested medication:  Medication: Enbrel Injectable 50 mg - Appeal initiated  Appeal Start Date:  9/12/2017  Insurance Company: [facility] - Phone [phone] Fax [phone]  Comments:  I received letter of medical necessity and attached it to previous appeal paperwork and manually faxed Enbrel appeal to [facility] for review (fax number [phone]). Phone number to check on appeal status is [phone].

## 2017-10-03 NOTE — TELEPHONE ENCOUNTER
MEDICATION APPEAL APPROVED    Medication: Enbrel Injectable 50 mg - Appeal approved  Authorization Effective Date: 9/28/2017  Authorization Expiration Date: 9/28/2018  Approved Dose/Quantity: inject 1 syringe every week  Reference #: [id number]   Insurance Company: [employer] - Phone [phone] Fax [phone]  Expected CoPay:    Unknown   CoPay Card Available:unknown      Foundation Assistance Needed:    Which Pharmacy is filling the prescription (Not needed for infusion/clinic administered): [facility] [address] [facility]   Phone: [phone]  Fax: [phone]

## 2017-10-10 ENCOUNTER — RADIANT APPOINTMENT (OUTPATIENT)
Dept: GENERAL RADIOLOGY | Facility: CLINIC | Age: 62
End: 2017-10-10
Attending: NURSE PRACTITIONER
Payer: COMMERCIAL

## 2017-10-10 ENCOUNTER — TELEPHONE (OUTPATIENT)
Dept: NEUROSURGERY | Facility: CLINIC | Age: 62
End: 2017-10-10

## 2017-10-10 ENCOUNTER — OFFICE VISIT (OUTPATIENT)
Dept: NEUROSURGERY | Facility: CLINIC | Age: 62
End: 2017-10-10
Payer: COMMERCIAL

## 2017-10-10 VITALS
HEART RATE: 113 BPM | HEIGHT: 61 IN | BODY MASS INDEX: 31.26 KG/M2 | WEIGHT: 165.6 LBS | TEMPERATURE: 96.9 F | OXYGEN SATURATION: 94 % | DIASTOLIC BLOOD PRESSURE: 81 MMHG | SYSTOLIC BLOOD PRESSURE: 138 MMHG | RESPIRATION RATE: 16 BRPM

## 2017-10-10 DIAGNOSIS — M54.50 ACUTE BILATERAL LOW BACK PAIN WITHOUT SCIATICA: Primary | ICD-10-CM

## 2017-10-10 DIAGNOSIS — M54.50 ACUTE BILATERAL LOW BACK PAIN WITHOUT SCIATICA: ICD-10-CM

## 2017-10-10 PROCEDURE — 99203 OFFICE O/P NEW LOW 30 MIN: CPT | Performed by: NURSE PRACTITIONER

## 2017-10-10 PROCEDURE — 72100 X-RAY EXAM L-S SPINE 2/3 VWS: CPT

## 2017-10-10 ASSESSMENT — PAIN SCALES - GENERAL: PAINLEVEL: MODERATE PAIN (5)

## 2017-10-10 NOTE — PROGRESS NOTES
"Dr. Guicho Mckeon  [facility]  Neurosurgery Clinic Visit        CC: Low back pain.    Primary care Provider: Janessa Farias      Reason For Visit:   The patient presents for evaluation of low back pain.      HPI: Lesly Campbell is a 61 year old female with low back pain.  She states she has had mild low back pain over the years, however, about 3 weeks ago she woke up with pain that has remained constant.  She describes it as a dull ache, \"The only way I can describe it is like back labor.\"  She states it is minimal pain while seated.  The pain is aggravated with walking and prolonged standing.  She did try yoga and states it seemed to help, \"But the day after it was worse.\"  She denies lumbar radicular symptoms.  She denies foot drop.  She denies changes to bowel or bladder.  She has not had recent PT.    Pain at its worst 9  Pain right now:  5    Past Medical History:   Diagnosis Date     Elevated LFTs      Epigastric abdominal pain      Myalgia      Myositis      Neuropathy      Pharyngitis      Prediabetes      Rheumatoid arthritis (H)        Past Medical History reviewed with patient during visit.    Past Surgical History:   Procedure Laterality Date     NO HISTORY OF SURGERY       Past Surgical History reviewed with patient during visit.    Current Outpatient Prescriptions   Medication     methocarbamol (ROBAXIN) 500 MG tablet     etanercept (ENBREL) 50 MG/ML injection     ibuprofen (ADVIL/MOTRIN) 800 MG tablet     sodium chloride (OCEAN) 0.65 % nasal spray     metFORMIN (GLUCOPHAGE-XR) 500 MG 24 hr tablet     gabapentin (NEURONTIN) 100 MG capsule     ONDANSETRON PO     OMEPRAZOLE PO     No current facility-administered medications for this visit.        Allergies   Allergen Reactions     Prednisone      bloating     Sulfa Drugs GI Disturbance     Antibiotics caused GI upset, diarrhea, vomiting, and cramps       Social History     Social History     Marital status: Single     Spouse " "name: N/A     Number of children: N/A     Years of education: N/A     Social History Main Topics     Smoking status: Former Smoker     Quit date: 2008     Smokeless tobacco: Never Used     Alcohol use Yes     Drug use: No     Sexual activity: Not Asked     Other Topics Concern     None     Social History Narrative       Family History   Problem Relation Age of Onset     Cirrhosis Mother      Liver Cancer Mother          ROS: 10 point ROS neg other than the symptoms noted above in the HPI.    Vital Signs: /81  Pulse 113  Temp 96.9  F (36.1  C) (Oral)  Resp 16  Ht 5' 1\" (1.549 m)  Wt 165 lb 9.6 oz (75.1 kg)  SpO2 94%  BMI 31.29 kg/m2    Examination:  Constitutional:  Alert, well nourished, NAD.  HEENT: Normocephalic, atraumatic.   Pulm:  Without shortness of breath   CV:  No pitting edema of BLE.    Neurological:  Awake  Alert  Oriented x 3  Speech clear  Cranial nerves II - XII intact    Motor exam   Shoulder Abduction:  Right:  5/5   Left:  5/5  Biceps:                      Right:  5/5   Left:  5/5  Triceps:                     Right:  5/5   Left:  5/5  Wrist Extensors:       Right:  5/5   Left:  5/5  Wrist Flexors:           Right:  5/5   Left:  5/5  Intrinsics:                   Right:  5/5   Left:  5/5   Hip Flexor:                Right: 5/5  Left:  5/5  Hip Adductor:             Right:  5/5  Left:  5/5  Hip Abductor:             Right:  5/5  Left:  5/5  Gastroc Soleus:        Right:  5/5  Left:  5/5  Tib/Ant:                      Right:  5/5  Left:  5/5  EHL:                          Right:  5/5  Left:  5/5   Sensation normal to bilateral upper and lower extremities  DTRs 2+ symmetric  Clonus negative    Gait: Able to stand from a seated position. Normal non-antalgic, non-myelopathic gait.  Able to heel/toe walk without loss of balance  Cervical examination reveals good range of motion.  No tenderness to palpation of the cervical spine or paraspinous muscles bilaterally.    Lumbar examination " "reveals no tenderness of the spine or paraspinous muscles. FROM without difficulty.  Hip height is symmetrical. Negative SI joint, sciatic notch or greater trochanteric tenderness to palpation bilaterally.  Straight leg raise is negative bilaterally.      Imaging: None      Assessment/Plan:   Low back pain    Lesly Campbell is a 61 year old female with low back pain.  She states she has had mild low back pain over the years, however, about 3 weeks ago she woke up with pain that has remained constant.  She describes it as a dull ache, \"The only way I can describe it is like back labor.\"  She states it is minimal pain while seated.  The pain is aggravated with walking and prolonged standing.  She did try yoga and states it seemed to help, \"But the day after it was worse.\"  She denies lumbar radicular symptoms.  She denies foot drop.  She denies changes to bowel or bladder.  She has not had recent PT.  We reviewed her history, and considering this is rather acute, discussed getting an Xray of her low back.  She is agreeable.  We discussed PT as well but she declined at this time.  We will call her with results of Xray.    Patient Instructions   Lumbar Xray  We will call you with results.  Please contact the clinic if pain persists at [phone].        Willow B[facility]  [facility]  [address] [address]    Tel [phone]  Pager [phone]    "

## 2017-10-10 NOTE — TELEPHONE ENCOUNTER
Reason for Call:  Other call back    Detailed comments: patient asking for results to be mailed to patient. Patient address verified. Patient also states that she longer wants the referral for PT. Patient decided that she will not go.    Phone Number Patient can be reached at: Home number on file [phone] (home)    Best Time: any time    Can we leave a detailed message on this number? YES    Call taken on 10/10/2017 at 12:58 PM by Cony Montaño

## 2017-10-10 NOTE — NURSING NOTE
"Lesly Campbell is a 61 year old female who presents for:  Chief Complaint   Patient presents with     Back Pain     Low back pain. Onset: 9/18/17. NKI. Pain just came on. She was seen and given a muscle relaxer, that helped. She now only takes it as needed. At first it was on the right side but now on bilateral low back. Denies any N/T. No treatments. She has RA.         Initial Vitals:  /81  Pulse 113  Temp 96.9  F (36.1  C) (Oral)  Resp 16  Ht 5' 1\" (1.549 m)  Wt 165 lb 9.6 oz (75.1 kg)  SpO2 94%  BMI 31.29 kg/m2 Estimated body mass index is 31.29 kg/(m^2) as calculated from the following:    Height as of this encounter: 5' 1\" (1.549 m).    Weight as of this encounter: 165 lb 9.6 oz (75.1 kg).. Body surface area is 1.8 meters squared. BP completed using cuff size: regular  Moderate Pain (5)    Do you feel safe in your environment?  Yes  Do you need any refills today? No    Nursing Comments: Low back pain. Onset: 9/18/17. NKI. Pain just came on. She was seen and given a muscle relaxer, that helped. She now only takes it as needed. At first it was on the right side but now on bilateral low back. Denies any N/T. No treatments. She has RA.         Kimberly Ferreira"

## 2017-10-10 NOTE — TELEPHONE ENCOUNTER
Spoke to Lesly.  She was provided with the phone number for health information management to obtain reports and images from today's visit.  Will message care team to let them know that she is not interested in PT at this time.  Lesly has no other questions.

## 2017-10-10 NOTE — PATIENT INSTRUCTIONS
Lumbar Xray  We will call you with results.  Please contact the clinic if pain persists at [phone].

## 2017-10-10 NOTE — MR AVS SNAPSHOT
After Visit Summary   10/10/2017    Lesly Campbell    MRN: [id number]           Patient Information     Date Of Birth          1955        Visit Information        Provider Department      10/10/2017 10:00 AM Willow Barrow NP [facility]        Today's Diagnoses     Acute bilateral low back pain without sciatica    -  1      Care Instructions    Lumbar Xray  We will call you with results.  Please contact the clinic if pain persists at [phone].            Follow-ups after your visit        Your next 10 appointments already scheduled     Dec 11, 2017 11:00 AM CST   [facility] Visit [facility]ida [facility] MD   [facility]    [address] [place] [id number] [phone]            Dec 28, 2017  1:40 PM CST   New Visit with Darryl Ibarra MD   [facility])    [id number] [place] [id number] [phone]              Future tests that were ordered for you today     Open Future Orders        Priority Expected Expires Ordered    XR Lumbar Spine 2/3 Views Routine 10/10/2017 10/10/2018 10/10/2017            Who to contact     If you have questions or need follow up information about today's clinic visit or your schedule please contact [facility] directly at [phone].  Normal or non-critical lab and imaging results will be communicated to you by MyChart, letter or phone within 4 business days after the clinic has received the results. If you do not hear from us within 7 days, please contact the clinic through MyChart or phone. If you have a critical or abnormal lab result, we will notify you by phone as soon as possible.  Submit refill requests through Vinny or call your pharmacy and they will forward the refill request to us. Please allow 3 business days for your refill to be completed.          Additional Information About Your Visit    "     MyChart Information     [facility] lets you send messages to your doctor, view your test results, renew your prescriptions, schedule appointments and more. To sign up, go to [email or web address] . Click on \"Log in\" on the left side of the screen, which will take you to the Welcome page. Then click on \"Sign up Now\" on the right side of the page.     You will be asked to enter the access code listed below, as well as some personal information. Please follow the directions to create your username and password.     Your access code is: [email or web address]  Expires: 2017  5:45 PM     Your access code will  in 90 days. If you need help or a new code, please call your [place] clinic or [phone].        Care EveryWhere ID     This is your Care EveryWhere ID. This could be used by other organizations to access your [place] medical records  [id number]        Your Vitals Were     Pulse Temperature Respirations Height Pulse Oximetry BMI (Body Mass Index)    113 96.9  F (36.1  C) (Oral) 16 5' 1\" (1.549 m) 94% 31.29 kg/m2       Blood Pressure from Last 3 Encounters:   10/10/17 138/81   17 141/76   17 124/77    Weight from Last 3 Encounters:   10/10/17 165 lb 9.6 oz (75.1 kg)   17 162 lb (73.5 kg)   17 162 lb 11.2 oz (73.8 kg)               Primary Care Provider Office Phone # Fax #    Janessa Farias [phone]        [facility] [address] [facility] [address]        Equal Access to Services     SHANNON GREENE AH: Hadii lauro Lopez, wafcoda luqadaha, qaybta kaalmada ty, chi falcon. So [facility] [phone].    ATENCIÓN: Si habla español, tiene a york disposición servicios gratuitos de asistencia lingüística. Llame al [phone].    We comply with applicable federal civil rights laws and [place] laws. We do not discriminate on the basis of race, color, national origin, age, disability, sex, sexual " orientation, or gender identity.            Thank you!     Thank you for choosing [facility]  for your care. Our goal is always to provide you with excellent care. Hearing back from our patients is one way we can continue to improve our services. Please take a few minutes to complete the written survey that you may receive in the mail after your visit with us. Thank you!             Your Updated Medication List - Protect others around you: Learn how to safely use, store and throw away your medicines at www.disposemymeds.org.          This list is accurate as of: 10/10/17 10:17 AM.  Always use your most recent med list.                   Brand Name Dispense Instructions for use Diagnosis    etanercept 50 MG/ML injection    ENBREL    4 Syringe    Inject 0.98 mLs (50 mg) Subcutaneous once a week Hold for signs of infection, and then seek medical attention.    Seropositive rheumatoid arthritis (H)       gabapentin 100 MG capsule    NEURONTIN     Take 100 mg by mouth 2 times daily as needed        ibuprofen 800 MG tablet    ADVIL/MOTRIN     Take 800 mg by mouth every 8 hours as needed for moderate pain        metFORMIN 500 MG 24 hr tablet    GLUCOPHAGE-XR     Take 500 mg by mouth 2 times daily (with meals)        methocarbamol 500 MG tablet    ROBAXIN    30 tablet    Take 1 tablet (500 mg) by mouth 4 times daily as needed for muscle spasms    Lumbar radiculopathy       OMEPRAZOLE PO      Take 20 mg by mouth every morning        ONDANSETRON PO      Take 8 mg by mouth every 8 hours as needed for nausea        sodium chloride 0.65 % nasal spray    OCEAN     Spray 2 sprays in nostril daily as needed for congestion

## 2017-10-23 ENCOUNTER — TRANSFERRED RECORDS (OUTPATIENT)
Dept: HEALTH INFORMATION MANAGEMENT | Facility: CLINIC | Age: 62
End: 2017-10-23

## 2017-11-08 ENCOUNTER — TELEPHONE (OUTPATIENT)
Dept: RHEUMATOLOGY | Facility: CLINIC | Age: 62
End: 2017-11-08

## 2017-11-08 NOTE — TELEPHONE ENCOUNTER
Spoke with patient, reports having site reactions for the last 4-5 injections of Enbrel. Burning sensation while during each injection, lasts about 10-15 seconds after. Pt reports site feel itchy and tender. No rash or other systemic reactions symptoms at this time. Reports taking Enbrel medication out of the fridge 20-45 mins prior to each injection. Pt scheduled for follow-up at the end of December, states that she doesn't know if she can wait that long to be seen due to the weekly injections.     Nurse advises to take medication out longer than 45 mins until it is at room temp, call Enbrel Support line at [phone] to see how long she can take medication out prior to self-injection or any other tips to decrease site reaction symptoms.   Nurse suggests to apply ice before and after injection to minimize local site reactions. Nurse recommends follow-up for evaluation of medication and symptoms.    Next injection due on Monday 11/13.    RYAN StephensN, RN, PHN  Rheumatology Care Coordinator  [facility]

## 2017-11-08 NOTE — TELEPHONE ENCOUNTER
[facility] Call Center    Phone Message    Name of Caller: Lesly    Phone Number: Home number on file [phone] (home)    Best time to return call: any    May a detailed message be left on voicemail: yes    Relation to patient: Self    Reason for Call: Other: Patient is requesting a call back. Patient states she has a couple questions that would be easier to speak wiht the clinic regarding. Please advise.      Action Taken: Message routed to:  Adult Clinics: Rheumatology p [phone]

## 2017-11-15 ENCOUNTER — OFFICE VISIT (OUTPATIENT)
Dept: RHEUMATOLOGY | Facility: CLINIC | Age: 62
End: 2017-11-15
Payer: COMMERCIAL

## 2017-11-15 VITALS
HEART RATE: 85 BPM | DIASTOLIC BLOOD PRESSURE: 71 MMHG | OXYGEN SATURATION: 96 % | BODY MASS INDEX: 31.14 KG/M2 | SYSTOLIC BLOOD PRESSURE: 133 MMHG | TEMPERATURE: 97 F | WEIGHT: 164.8 LBS

## 2017-11-15 DIAGNOSIS — M05.9 SEROPOSITIVE RHEUMATOID ARTHRITIS (H): Primary | ICD-10-CM

## 2017-11-15 PROCEDURE — 99214 OFFICE O/P EST MOD 30 MIN: CPT | Performed by: STUDENT IN AN ORGANIZED HEALTH CARE EDUCATION/TRAINING PROGRAM

## 2017-11-15 RX ORDER — LORATADINE 10 MG/1
TABLET ORAL DAILY
COMMUNITY
Start: 2017-11-09

## 2017-11-15 ASSESSMENT — PAIN SCALES - GENERAL: PAINLEVEL: MILD PAIN (2)

## 2017-11-15 NOTE — PATIENT INSTRUCTIONS
-- Will continue ENBREL for now,     -- If burning persists then will try Plaquenil. Other medications are injectable and Xeljanz which is tablet form can effect the liver.     -- RTC in 6 weeks.

## 2017-11-15 NOTE — LETTER
11/15/2017      RE: Lesly Campbell  [address] [facility] [address]       Rheumatology Clinic Visit     Lesly Campbell MRN# [id number]   YOB: 1955 Age: 61 year old     Date of Visit: November 15, 2017    Primary care provider: Janessa Farias          Assessment and Plan:   ASSESSMENT:     --. Seropositive rheumatoid arthritis, positive RF, positive CCP.   --. Elevated ALT.  History of abnormal liver enzymes in the past.   --  Fatty liver on abdominal ultrasound  -- Fatigue and morning stiffness.     -- Numbness and tingling in the fingertips.   --. Diabetes and neuropathy.    Ms. Campbell presented today for management of RA. She is on Enbrel 50 mg subcutaneous once a week for the last 3 months. Her joint pains and morning stiffness have improved. She was given sulfasalazine before but could not tolerate it due to GI side effects. Over all she is doing better with Enbrel but got site reaction and burning pain. She also does not like to give herself injections every week and was wondering about oral medications.     She has elevated LFTs mostly ALT since June of this year. Sulfasalazine use made it worse. She is off of sulfasalazine now for the past 3 months even then her liver function tests have not improved. The ultrasound showed Fatty infiltration of the liver. I will consider GI referral for further evaluation.  F actin antibody for autoimmune hepatitis is negative.     I recommended her to try slowly injecting Enbrel, sometimes that helps. She is willing to give it try for few more injections. If she will still have problems then will consider another DMARD agent. Humira can be considered since it is every 2 weeks. If she would like to do only oral medications then Plaquenil can be given. Xeljanz can cause liver problems.       Plan     -- Labs -  CBC, ESR, CRP, creatinine done on 9/12/17 were normal except for ALT level of 88.     -- She has appointment with Dr Ibarra for  liver function abnormality next month.     -- Continue Enbrel 50 mg q. once a week. Will consider other medications like Plaquenil if does not want to be on Injectables. Xeljanz can cause hepatic problems.     -- RTC in 6 weeks.               Active Problem List:     Patient Active Problem List    Diagnosis Date Noted     Elevated LFTs 09/25/2017     Priority: Medium     Seropositive rheumatoid arthritis (H) 07/14/2017     Priority: Medium     Neuropathy 07/14/2017     Priority: Medium     Pre-diabetes 06/22/2017     Priority: Medium     See telephone note and scanned documents.               History of Present Illness:   Lesly Campbell is a 61 year old female past medical history of rheumatoid arthritis, newly diagnosed diabetes, neuropathy seen in the clinic at consultation request of primary care Janessa Farias for evaluation and treatment of rheumatoid arthritis. She is coming today for follow up.     Interim History : November 15, 2017 - She is on Enbrel 50 mg subcutaneous weekly and has noticed improvement in joint pain and stiffness. Numbness and tingling in her hands improved. She was experiencing site reaction with Enbrel, she tried to injecting it slowly over 20-30 seconds which decreased the pain and burning sensation. She still does not want to be on injectables and was wondering about other oral medication options. She still has abnormal ALT and will be seeing Dr. Ibarra in GI clinic next month.     Interim History : 9/11/17 -  She is on Enbrel 50 mg SQ for 2 months. She denies any side effects with Enbrel use, no local injection site reaction. She has noticed improvement in joint pains and morning stiffness. She is not on prednisone. She occasionally uses ibuprofen. She does complain of fatigue. Denies any abdominal pain, change in appetite, weight loss or diarrhea. She was given sulfasalazine before Enbrel but could not tolerate it due to GI side effects.       Interim History - 7/5/17 - She is  taking 5 mg prednisone. She has noticed significant improvement in joint pains especially in her hands, wrists and feet. Morning stiffness is better and reduced to less than 1 hour. She is not taking Tylenol at present. Takes ibuprofen 800 mg as needed basis, less frequent than before after start of prednisone. She still has numbness and tingling in her fingertips and heel and toes. She was recently diagnosed with diabetes and started metformin and gabapentin 100 mg.    Denies any new fever, chills, weight loss. Denies any raynauds, malar rash, photosensitivity, recurrent mouth/genital ulcers, sicca symptoms, pleuritic chest pains, recurrent sinusitis/rhinitis, swallowing difficulty, hearing or visual changes recently.     HPI from Initial consult  Ms. Campebll was diagnosed with rheumatoid arthritis in 2001.  She was followed by Dr. Jimenez in [facility] and later by Dr. Meade at [facility].  She has taken methotrexate 7 tablets in the past.  She did mention about taking self injectables as well as infusions but could not recall the name of her medications.  Due to loss of insurance she could not take these medications and could not follow up with her rheumatologist.  Since 2010, she has not taken her methotrexate.  She follows up with a primary care at [facility] and her pain is managed with Tylenol 1000 mg and ibuprofen 800 mg on as needed basis.  According to her, she was doing fine since 2010, had no flareups but starting in 05/2017 she started complaining more pain in her hands and feet along with morning stiffness.  She also feels more fatigued and tired.  Her primary care did some labs including CBC, liver, kidney function and rheumatoid factor.  Her rheumatoid factor came back high at 54.  SILVINA was negative.  She had mild increase in ALT to 41.  Otherwise, had normal CRP and CBC.  Along with some mild stiffness and pain in her hands she also complains of a significant burning pain in her  fingertips.  She was prescribed metformin and gabapentin by her primary care, but has not started taking these medications yet.  She was given a prednisone taper starting with 60 mg and tapering by 10 mg every 2 weeks to off.  She just finished her prednisone taper a few days ago.  Prednisone did help with her joint pains but it did not help with the numbness and tingling.  Plus, she did not like the side effects of prednisone.  She felt anxious and had sleep disturbances on it.              Review of Systems:   Review Of Systems  Constitutional: denies fever, chills, night sweats and weight loss.  Skin: No skin rash. Burning Sensation at the Site of Enbrel Injection.   Eyes: No dryness or irritation in eyes. No episode of eye inflammation or redness.   Ears/Nose/Throat: no recurrent sinus infections.  Respiratory: No shortness of breath, dyspnea on exertion, cough, or hemoptysis  Cardiovascular: no chest pain or palpitations.  Gastrointestinal: no nausea, vomiting, abdominal pain.  Normal bowel movements.  Genitourinary: no dysuria, frequency  or hematuria.  Musculoskeletal: as in HPI  Neurologic: + numbness, tingling.  Psychiatric: no mood disorders.  Hematologic/Lymphatic/Immunologic: no history of easy bruising, petechia or purpura.  No abnormal bleeding.   Endocrine: no h/o thyroid disease, + Diabetes.                  Past Medical History:[facility]     Past Medical History:   Diagnosis Date     Elevated LFTs      Epigastric abdominal pain      Myalgia      Myositis      Neuropathy      Pharyngitis      Prediabetes      Rheumatoid arthritis (H)      Past Surgical History:   Procedure Laterality Date     NO HISTORY OF SURGERY              Social History:     Social History     Occupational History     Not on file.     Social History Main Topics     Smoking status: Former Smoker     Quit date: 2008     Smokeless tobacco: Never Used     Alcohol use Yes     Drug use: No     Sexual activity: Not on file            Family  History:     Family History   Problem Relation Age of Onset     Cirrhosis Mother      Liver Cancer Mother             Allergies:     Allergies   Allergen Reactions     Prednisone      bloating     Sulfa Drugs GI Disturbance     Antibiotics caused GI upset, diarrhea, vomiting, and cramps            Medications:     Current Outpatient Prescriptions   Medication Sig Dispense Refill     etanercept (ENBREL) 50 MG/ML injection Inject 0.98 mLs (50 mg) Subcutaneous once a week Hold for signs of infection, and then seek medical attention. 4 Syringe 5     ibuprofen (ADVIL/MOTRIN) 800 MG tablet Take 800 mg by mouth every 8 hours as needed for moderate pain       sodium chloride (OCEAN) 0.65 % nasal spray Spray 2 sprays in nostril daily as needed for congestion       metFORMIN (GLUCOPHAGE-XR) 500 MG 24 hr tablet Take 500 mg by mouth 2 times daily (with meals)        gabapentin (NEURONTIN) 100 MG capsule Take 100 mg by mouth 2 times daily as needed       OMEPRAZOLE PO Take 20 mg by mouth every morning       loratadine (CLARITIN) 10 MG tablet        methocarbamol (ROBAXIN) 500 MG tablet Take 1 tablet (500 mg) by mouth 4 times daily as needed for muscle spasms (Patient not taking: Reported on 11/15/2017) 30 tablet 0     ONDANSETRON PO Take 8 mg by mouth every 8 hours as needed for nausea              Physical Exam:   Blood pressure 133/71, pulse 85, temperature 97  F (36.1  C), weight 74.8 kg (164 lb 12.8 oz), SpO2 96 %.  Wt Readings from Last 4 Encounters:   11/15/17 74.8 kg (164 lb 12.8 oz)   10/10/17 75.1 kg (165 lb 9.6 oz)   09/19/17 73.5 kg (162 lb)   09/11/17 73.8 kg (162 lb 11.2 oz)       Constitutional: well-developed, appearing stated age; cooperative  Eyes: nl EOM, PERRLA, vision, conjunctiva, sclera  ENT: nl external ears, nose, hearing, lips, teeth, gums, throat  No mucous membrane lesions, normal saliva pool  Neck: no mass or thyroid enlargement  Resp: lungs clear to auscultation, nl to palpation  CV: RRR, no murmurs,  rubs or gallops, no edema  GI: no ABD mass or tenderness, no HSM  : not tested  Lymph: no cervical, supraclavicular, inguinal or epitrochlear nodes    MS: All TMJ, neck, shoulder, elbow, wrist, MCP/PIP/DIP, spine, hip, knee, ankle, and foot MTP/IP joints were examined.  --  No active synovitis or deformity present over MCP, PIP, wrists, elbows, shoulders bilaterally. Full ROM.  Normal  strength.   -- No tenderness on MTP squeeze. No synovitis tenderness over MTP and ankles. No knee effusions.  -- No dactylitis,  tenosynovitis, enthesopathy.    Skin: no nail pitting, alopecia, rash, nodules or lesions  Neuro: nl cranial nerves, strength, sensation, DTRs.   Psych: nl judgement, orientation, memory, affect.         Data:     Results for orders placed or performed in visit on 10/10/17   XR Lumbar Spine 2/3 Views    Narrative    XR LUMBAR SPINE 2-3 VIEWS 10/10/2017 10:40 AM    COMPARISON: None.    HISTORY: Low back pain.      Impression    IMPRESSION: Vertebral heights are preserved without evidence of  fracture. Moderate to severe disc space loss at L5-S1 and to a lesser  extent at L3-4. No significant listhesis identified at any level.    AMELIA GALVAN MD       Recent Labs   Lab Test  06/14/17   1215   WBC  7.1   RBC  4.37   HGB  13.2   HCT  40.3   MCV  92   RDW  14.7   PLT  283   ALBUMIN  3.6   CRP  9.7*      Recent Labs   Lab Test  06/14/17   1215   TSH  0.98     Hemoglobin   Date Value Ref Range Status   09/11/2017 13.7 11.7 - 15.7 g/dL Final   07/28/2017 13.4 11.7 - 15.7 g/dL Final   06/14/2017 13.2 11.7 - 15.7 g/dL Final     Sed Rate   Date Value Ref Range Status   09/11/2017 9 0 - 30 mm/h Final   07/05/2017 12 0 - 30 mm/h Final   06/14/2017 29 0 - 30 mm/h Final     CRP Inflammation   Date Value Ref Range Status   09/11/2017 <2.9 0.0 - 8.0 mg/L Final   07/05/2017 <2.9 0.0 - 8.0 mg/L Final   06/14/2017 9.7 (H) 0.0 - 8.0 mg/L Final     AST   Date Value Ref Range Status   09/11/2017 43 0 - 45 U/L Final    08/09/2017 32 0 - 45 U/L Final   07/28/2017 46 (H) 0 - 45 U/L Final     Albumin   Date Value Ref Range Status   09/11/2017 3.8 3.4 - 5.0 g/dL Final   08/09/2017 3.7 3.4 - 5.0 g/dL Final   07/28/2017 3.8 3.4 - 5.0 g/dL Final     Alkaline Phosphatase   Date Value Ref Range Status   09/11/2017 63 40 - 150 U/L Final   08/09/2017 57 40 - 150 U/L Final   07/28/2017 64 40 - 150 U/L Final     ALT   Date Value Ref Range Status   09/11/2017 88 (H) 0 - 50 U/L Final   08/09/2017 65 (H) 0 - 50 U/L Final   07/28/2017 71 (H) 0 - 50 U/L Final     Recent Labs   Lab Test  09/11/17   1035  08/09/17   0927  07/28/17   1158   06/14/17   1215   WBC  6.9   --   7.6   --   7.1   HGB  13.7   --   13.4   --   13.2   HCT  42.0   --   41.9   --   40.3   MCV  92   --   93   --   92   PLT  312   --   318   --   283   TSH   --    --    --    --   0.98   AST  43  32  46*   < >  45   ALT  88*  65*  71*   < >  63*   ALKPHOS  63  57  64   < >  69    < > = values in this interval not displayed.     Other studies:   X-ray hands  FINDINGS: AP, oblique, and lateral views of the bilateral hands and  wrists were obtained. Bones are well aligned. Joint spaces are  well-maintained. No erosive changes. No displaced fractures. No soft  tissue abnormalities.         IMPRESSION: No erosive changes in the bilateral hands or wrists.    Outside studies reviewed:   Primary care at [facility] office labs done on 06/06/2017 showed creatinine of 0.7, BUN 15, AST 21, ALT raised to 44.  Lyme screen done, not back yet.  Rheumatoid factor high 54.  CRP was normal 0.13, SILVINA negative.  Hepatitis C is negative.  CBC in 05/2017 which showed normal hemoglobin.  No leukopenia.  Past labs in 2010, she had a CCP antibody which was high at 34.  rheumatoid factor was 16.     Reviewed Rheumatology lab flowsheet    Aida Meza MD  [facility]   Pager - [phone]      Aida Meza MD

## 2017-11-15 NOTE — MR AVS SNAPSHOT
After Visit Summary   11/15/2017    Lesly Campbell    MRN: [id number]           Patient Information     Date Of Birth          1955        Visit Information        Provider Department      11/15/2017 1:30 PM Aida Meza MD [facility]        Today's Diagnoses     Seropositive rheumatoid arthritis (H)    -  1      Care Instructions    -- Will continue ENBREL for now,     -- If burning persists then will try Plaquenil. Other medications are injectable and Xeljanz which is tablet form can effect the liver.     -- RTC in 6 weeks.           Follow-ups after your visit        Your next 10 appointments already scheduled     Dec 28, 2017  1:00 PM CST   LAB with LAB FIRST FLOOR [facility])    [address] [place] [id number]   [phone]           Please do not eat 10-12 hours before your appointment if you are coming in fasting for labs on lipids, cholesterol, or glucose (sugar). This does not apply to pregnant women. Water, hot tea and black coffee (with nothing added) are okay. Do not drink other fluids, diet soda or chew gum.            Dec 28, 2017  1:40 PM CST   New Visit with Darryl Ibarra MD   [facility] ([facility])    [address] [place] [address]   [phone]            Dec 28, 2017  2:30 PM CST   Return Visit with Aida Meza MD   [facility])    [address] [place] [id number]   [phone]              Future tests that were ordered for you today     Open Future Orders        Priority Expected Expires Ordered    CBC with platelets differential Routine 12/15/2017 11/15/2018 11/15/2017    AST Routine 12/15/2017 11/15/2018 11/15/2017    ALT Routine 12/15/2017 11/15/2018 11/15/2017    Creatinine Routine 12/15/2017 11/15/2018 11/15/2017    CRP inflammation Routine 12/15/2017  11/15/2018 11/15/2017    Erythrocyte sedimentation rate auto Routine 12/15/2017 11/15/2018 11/15/2017            Who to contact     If you have questions or need follow up information about today's clinic visit or your schedule please contact [facility] directly at [phone].  Normal or non-critical lab and imaging results will be communicated to you by MyChart, letter or phone within 4 business days after the clinic has received the results. If you do not hear from us within 7 days, please contact the clinic through MyChart or phone. If you have a critical or abnormal lab result, we will notify you by phone as soon as possible.  Submit refill requests through [employer] or call your pharmacy and they will forward the refill request to us. Please allow 3 business days for your refill to be completed.          Additional Information About Your Visit        [employer] Information     [employer] is an electronic gateway that provides easy, online access to your medical records. With [employer], you can request a clinic appointment, read your test results, renew a prescription or communicate with your care team.     To sign up for [employer] visit the website at [email or web address]   You will be asked to enter the access code listed below, as well as some personal information. Please follow the directions to create your username and password.     Your access code is: [email or web address]  Expires: 2017  4:45 PM     Your access code will  in 90 days. If you need help or a new code, please contact your [facility] or call [phone] for assistance.        Care EveryWhere ID     This is your Care EveryWhere ID. This could be used by other organizations to access your [place] medical records  [id number]        Your Vitals Were     Pulse Temperature Pulse Oximetry BMI (Body Mass Index)          85 97  F (36.1  C) 96% 31.14 kg/m2         Blood Pressure from Last 3  Encounters:   11/15/17 133/71   10/10/17 138/81   09/19/17 141/76    Weight from Last 3 Encounters:   11/15/17 74.8 kg (164 lb 12.8 oz)   10/10/17 75.1 kg (165 lb 9.6 oz)   09/19/17 73.5 kg (162 lb)               Primary Care Provider Office Phone # Fax #    Janessa Farias, SHEN [phone]        [place] [address] [address] [place] [address]        Equal Access to Services     [facility] : Hadii aad ku hadasho Soomaali, waaxda luqadaha, qaybta kaalmada adeegyada, waxcelas najera . So [facility] [phone].    ATENCIÓN: Si habla español, tiene a york disposición servicios gratuitos de asistencia lingüística. Llame al [phone].    We comply with applicable federal civil rights laws and [place] laws. We do not discriminate on the basis of race, color, national origin, age, disability, sex, sexual orientation, or gender identity.            Thank you!     Thank you for choosing [facility]  for your care. Our goal is always to provide you with excellent care. Hearing back from our patients is one way we can continue to improve our services. Please take a few minutes to complete the written survey that you may receive in the mail after your visit with us. Thank you!             Your Updated Medication List - Protect others around you: Learn how to safely use, store and throw away your medicines at www.disposemymeds.org.          This list is accurate as of: 11/15/17  2:07 PM.  Always use your most recent med list.                   Brand Name Dispense Instructions for use Diagnosis    etanercept 50 MG/ML injection    ENBREL    4 Syringe    Inject 0.98 mLs (50 mg) Subcutaneous once a week Hold for signs of infection, and then seek medical attention.    Seropositive rheumatoid arthritis (H)       gabapentin 100 MG capsule    NEURONTIN     Take 100 mg by mouth 2 times daily as needed        ibuprofen 800 MG tablet    ADVIL/MOTRIN     Take 800 mg by  mouth every 8 hours as needed for moderate pain        loratadine 10 MG tablet    CLARITIN      Seropositive rheumatoid arthritis (H)       metFORMIN 500 MG 24 hr tablet    GLUCOPHAGE-XR     Take 500 mg by mouth 2 times daily (with meals)        methocarbamol 500 MG tablet    ROBAXIN    30 tablet    Take 1 tablet (500 mg) by mouth 4 times daily as needed for muscle spasms    Lumbar radiculopathy       OMEPRAZOLE PO      Take 20 mg by mouth every morning        ONDANSETRON PO      Take 8 mg by mouth every 8 hours as needed for nausea        sodium chloride 0.65 % nasal spray    OCEAN     Spray 2 sprays in nostril daily as needed for congestion

## 2017-11-15 NOTE — NURSING NOTE
"Lesly Campbell's goals for this visit include:   She requests these members of her care team be copied on today's visit information:    PCP: Janessa Farias    Referring Provider:  No referring provider defined for this encounter.    Chief Complaint   Patient presents with     RECHECK       Initial /71  Pulse 85  Temp 97  F (36.1  C)  Wt 74.8 kg (164 lb 12.8 oz)  SpO2 96%  BMI 31.14 kg/m2 Estimated body mass index is 31.14 kg/(m^2) as calculated from the following:    Height as of 10/10/17: 1.549 m (5' 1\").    Weight as of this encounter: 74.8 kg (164 lb 12.8 oz).  Medication Reconciliation: complete    Do you need any medication refills at today's visit? No  Chief Complaint   Patient presents with     RECHECK         "

## 2017-11-15 NOTE — PROGRESS NOTES
Rheumatology Clinic Visit     Lesly Campbell MRN# [id number]   YOB: 1955 Age: 61 year old     Date of Visit: November 15, 2017    Primary care provider: Janessa Farias          Assessment and Plan:   ASSESSMENT:     --. Seropositive rheumatoid arthritis, positive RF, positive CCP.   --. Elevated ALT.  History of abnormal liver enzymes in the past.   --  Fatty liver on abdominal ultrasound  -- Fatigue and morning stiffness.     -- Numbness and tingling in the fingertips.   --. Diabetes and neuropathy.    Ms. Campbell presented today for management of RA. She is on Enbrel 50 mg subcutaneous once a week for the last 3 months. Her joint pains and morning stiffness have improved. She was given sulfasalazine before but could not tolerate it due to GI side effects. Over all she is doing better with Enbrel but got site reaction and burning pain. She also does not like to give herself injections every week and was wondering about oral medications.     She has elevated LFTs mostly ALT since June of this year. Sulfasalazine use made it worse. She is off of sulfasalazine now for the past 3 months even then her liver function tests have not improved. The ultrasound showed Fatty infiltration of the liver. I will consider GI referral for further evaluation.  F actin antibody for autoimmune hepatitis is negative.     I recommended her to try slowly injecting Enbrel, sometimes that helps. She is willing to give it try for few more injections. If she will still have problems then will consider another DMARD agent. Humira can be considered since it is every 2 weeks. If she would like to do only oral medications then Plaquenil can be given. Xeljanz can cause liver problems.       Plan     -- Labs -  CBC, ESR, CRP, creatinine done on 9/12/17 were normal except for ALT level of 88.     -- She has appointment with Dr Ibarra for liver function abnormality next month.     -- Continue Enbrel 50 mg q. once a week. Will  consider other medications like Plaquenil if does not want to be on Injectables. Xeljanz can cause hepatic problems.     -- RTC in 6 weeks.               Active Problem List:     Patient Active Problem List    Diagnosis Date Noted     Elevated LFTs 09/25/2017     Priority: Medium     Seropositive rheumatoid arthritis (H) 07/14/2017     Priority: Medium     Neuropathy 07/14/2017     Priority: Medium     Pre-diabetes 06/22/2017     Priority: Medium     See telephone note and scanned documents.               History of Present Illness:   Lesly Campbell is a 61 year old female past medical history of rheumatoid arthritis, newly diagnosed diabetes, neuropathy seen in the clinic at consultation request of primary care Janessa Farias for evaluation and treatment of rheumatoid arthritis. She is coming today for follow up.     Interim History : November 15, 2017 - She is on Enbrel 50 mg subcutaneous weekly and has noticed improvement in joint pain and stiffness. Numbness and tingling in her hands improved. She was experiencing site reaction with Enbrel, she tried to injecting it slowly over 20-30 seconds which decreased the pain and burning sensation. She still does not want to be on injectables and was wondering about other oral medication options. She still has abnormal ALT and will be seeing Dr. Ibarra in GI clinic next month.     Interim History : 9/11/17 -  She is on Enbrel 50 mg SQ for 2 months. She denies any side effects with Enbrel use, no local injection site reaction. She has noticed improvement in joint pains and morning stiffness. She is not on prednisone. She occasionally uses ibuprofen. She does complain of fatigue. Denies any abdominal pain, change in appetite, weight loss or diarrhea. She was given sulfasalazine before Enbrel but could not tolerate it due to GI side effects.       Interim History - 7/5/17 - She is taking 5 mg prednisone. She has noticed significant improvement in joint pains especially in  her hands, wrists and feet. Morning stiffness is better and reduced to less than 1 hour. She is not taking Tylenol at present. Takes ibuprofen 800 mg as needed basis, less frequent than before after start of prednisone. She still has numbness and tingling in her fingertips and heel and toes. She was recently diagnosed with diabetes and started metformin and gabapentin 100 mg.    Denies any new fever, chills, weight loss. Denies any raynauds, malar rash, photosensitivity, recurrent mouth/genital ulcers, sicca symptoms, pleuritic chest pains, recurrent sinusitis/rhinitis, swallowing difficulty, hearing or visual changes recently.     HPI from Initial consult  Ms. Campbell was diagnosed with rheumatoid arthritis in 2001.  She was followed by Dr. Jimenez in [facility] and later by Dr. Meade at [facility].  She has taken methotrexate 7 tablets in the past.  She did mention about taking self injectables as well as infusions but could not recall the name of her medications.  Due to loss of insurance she could not take these medications and could not follow up with her rheumatologist.  Since 2010, she has not taken her methotrexate.  She follows up with a primary care at [facility] and her pain is managed with Tylenol 1000 mg and ibuprofen 800 mg on as needed basis.  According to her, she was doing fine since 2010, had no flareups but starting in 05/2017 she started complaining more pain in her hands and feet along with morning stiffness.  She also feels more fatigued and tired.  Her primary care did some labs including CBC, liver, kidney function and rheumatoid factor.  Her rheumatoid factor came back high at 54.  SILVINA was negative.  She had mild increase in ALT to 41.  Otherwise, had normal CRP and CBC.  Along with some mild stiffness and pain in her hands she also complains of a significant burning pain in her fingertips.  She was prescribed metformin and gabapentin by her primary care, but has not  started taking these medications yet.  She was given a prednisone taper starting with 60 mg and tapering by 10 mg every 2 weeks to off.  She just finished her prednisone taper a few days ago.  Prednisone did help with her joint pains but it did not help with the numbness and tingling.  Plus, she did not like the side effects of prednisone.  She felt anxious and had sleep disturbances on it.              Review of Systems:   Review Of Systems  Constitutional: denies fever, chills, night sweats and weight loss.  Skin: No skin rash. Burning Sensation at the Site of Enbrel Injection.   Eyes: No dryness or irritation in eyes. No episode of eye inflammation or redness.   Ears/Nose/Throat: no recurrent sinus infections.  Respiratory: No shortness of breath, dyspnea on exertion, cough, or hemoptysis  Cardiovascular: no chest pain or palpitations.  Gastrointestinal: no nausea, vomiting, abdominal pain.  Normal bowel movements.  Genitourinary: no dysuria, frequency  or hematuria.  Musculoskeletal: as in HPI  Neurologic: + numbness, tingling.  Psychiatric: no mood disorders.  Hematologic/Lymphatic/Immunologic: no history of easy bruising, petechia or purpura.  No abnormal bleeding.   Endocrine: no h/o thyroid disease, + Diabetes.                  Past Medical History:[facility]     Past Medical History:   Diagnosis Date     Elevated LFTs      Epigastric abdominal pain      Myalgia      Myositis      Neuropathy      Pharyngitis      Prediabetes      Rheumatoid arthritis (H)      Past Surgical History:   Procedure Laterality Date     NO HISTORY OF SURGERY              Social History:     Social History     Occupational History     Not on file.     Social History Main Topics     Smoking status: Former Smoker     Quit date: 2008     Smokeless tobacco: Never Used     Alcohol use Yes     Drug use: No     Sexual activity: Not on file            Family History:     Family History   Problem Relation Age of Onset     Cirrhosis Mother       Liver Cancer Mother             Allergies:     Allergies   Allergen Reactions     Prednisone      bloating     Sulfa Drugs GI Disturbance     Antibiotics caused GI upset, diarrhea, vomiting, and cramps            Medications:     Current Outpatient Prescriptions   Medication Sig Dispense Refill     etanercept (ENBREL) 50 MG/ML injection Inject 0.98 mLs (50 mg) Subcutaneous once a week Hold for signs of infection, and then seek medical attention. 4 Syringe 5     ibuprofen (ADVIL/MOTRIN) 800 MG tablet Take 800 mg by mouth every 8 hours as needed for moderate pain       sodium chloride (OCEAN) 0.65 % nasal spray Spray 2 sprays in nostril daily as needed for congestion       metFORMIN (GLUCOPHAGE-XR) 500 MG 24 hr tablet Take 500 mg by mouth 2 times daily (with meals)        gabapentin (NEURONTIN) 100 MG capsule Take 100 mg by mouth 2 times daily as needed       OMEPRAZOLE PO Take 20 mg by mouth every morning       loratadine (CLARITIN) 10 MG tablet        methocarbamol (ROBAXIN) 500 MG tablet Take 1 tablet (500 mg) by mouth 4 times daily as needed for muscle spasms (Patient not taking: Reported on 11/15/2017) 30 tablet 0     ONDANSETRON PO Take 8 mg by mouth every 8 hours as needed for nausea              Physical Exam:   Blood pressure 133/71, pulse 85, temperature 97  F (36.1  C), weight 74.8 kg (164 lb 12.8 oz), SpO2 96 %.  Wt Readings from Last 4 Encounters:   11/15/17 74.8 kg (164 lb 12.8 oz)   10/10/17 75.1 kg (165 lb 9.6 oz)   09/19/17 73.5 kg (162 lb)   09/11/17 73.8 kg (162 lb 11.2 oz)       Constitutional: well-developed, appearing stated age; cooperative  Eyes: nl EOM, PERRLA, vision, conjunctiva, sclera  ENT: nl external ears, nose, hearing, lips, teeth, gums, throat  No mucous membrane lesions, normal saliva pool  Neck: no mass or thyroid enlargement  Resp: lungs clear to auscultation, nl to palpation  CV: RRR, no murmurs, rubs or gallops, no edema  GI: no ABD mass or tenderness, no HSM  : not  tested  Lymph: no cervical, supraclavicular, inguinal or epitrochlear nodes    MS: All TMJ, neck, shoulder, elbow, wrist, MCP/PIP/DIP, spine, hip, knee, ankle, and foot MTP/IP joints were examined.  --  No active synovitis or deformity present over MCP, PIP, wrists, elbows, shoulders bilaterally. Full ROM.  Normal  strength.   -- No tenderness on MTP squeeze. No synovitis tenderness over MTP and ankles. No knee effusions.  -- No dactylitis,  tenosynovitis, enthesopathy.    Skin: no nail pitting, alopecia, rash, nodules or lesions  Neuro: nl cranial nerves, strength, sensation, DTRs.   Psych: nl judgement, orientation, memory, affect.         Data:     Results for orders placed or performed in visit on 10/10/17   XR Lumbar Spine 2/3 Views    Narrative    XR LUMBAR SPINE 2-3 VIEWS 10/10/2017 10:40 AM    COMPARISON: None.    HISTORY: Low back pain.      Impression    IMPRESSION: Vertebral heights are preserved without evidence of  fracture. Moderate to severe disc space loss at L5-S1 and to a lesser  extent at L3-4. No significant listhesis identified at any level.    AMELIA GALVAN MD       Recent Labs   Lab Test  06/14/17   1215   WBC  7.1   RBC  4.37   HGB  13.2   HCT  40.3   MCV  92   RDW  14.7   PLT  283   ALBUMIN  3.6   CRP  9.7*      Recent Labs   Lab Test  06/14/17   1215   TSH  0.98     Hemoglobin   Date Value Ref Range Status   09/11/2017 13.7 11.7 - 15.7 g/dL Final   07/28/2017 13.4 11.7 - 15.7 g/dL Final   06/14/2017 13.2 11.7 - 15.7 g/dL Final     Sed Rate   Date Value Ref Range Status   09/11/2017 9 0 - 30 mm/h Final   07/05/2017 12 0 - 30 mm/h Final   06/14/2017 29 0 - 30 mm/h Final     CRP Inflammation   Date Value Ref Range Status   09/11/2017 <2.9 0.0 - 8.0 mg/L Final   07/05/2017 <2.9 0.0 - 8.0 mg/L Final   06/14/2017 9.7 (H) 0.0 - 8.0 mg/L Final     AST   Date Value Ref Range Status   09/11/2017 43 0 - 45 U/L Final   08/09/2017 32 0 - 45 U/L Final   07/28/2017 46 (H) 0 - 45 U/L Final     Albumin    Date Value Ref Range Status   09/11/2017 3.8 3.4 - 5.0 g/dL Final   08/09/2017 3.7 3.4 - 5.0 g/dL Final   07/28/2017 3.8 3.4 - 5.0 g/dL Final     Alkaline Phosphatase   Date Value Ref Range Status   09/11/2017 63 40 - 150 U/L Final   08/09/2017 57 40 - 150 U/L Final   07/28/2017 64 40 - 150 U/L Final     ALT   Date Value Ref Range Status   09/11/2017 88 (H) 0 - 50 U/L Final   08/09/2017 65 (H) 0 - 50 U/L Final   07/28/2017 71 (H) 0 - 50 U/L Final     Recent Labs   Lab Test  09/11/17   1035  08/09/17   0927  07/28/17   1158   06/14/17   1215   WBC  6.9   --   7.6   --   7.1   HGB  13.7   --   13.4   --   13.2   HCT  42.0   --   41.9   --   40.3   MCV  92   --   93   --   92   PLT  312   --   318   --   283   TSH   --    --    --    --   0.98   AST  43  32  46*   < >  45   ALT  88*  65*  71*   < >  63*   ALKPHOS  63  57  64   < >  69    < > = values in this interval not displayed.     Other studies:   X-ray hands  FINDINGS: AP, oblique, and lateral views of the bilateral hands and  wrists were obtained. Bones are well aligned. Joint spaces are  well-maintained. No erosive changes. No displaced fractures. No soft  tissue abnormalities.         IMPRESSION: No erosive changes in the bilateral hands or wrists.    Outside studies reviewed:   Primary care at [facility] office labs done on 06/06/2017 showed creatinine of 0.7, BUN 15, AST 21, ALT raised to 44.  Lyme screen done, not back yet.  Rheumatoid factor high 54.  CRP was normal 0.13, SILVINA negative.  Hepatitis C is negative.  CBC in 05/2017 which showed normal hemoglobin.  No leukopenia.  Past labs in 2010, she had a CCP antibody which was high at 34.  rheumatoid factor was 16.     Reviewed Rheumatology lab flowsheet    Aida Meza MD  [facility]   Pager - [phone]

## 2017-11-30 ENCOUNTER — OFFICE VISIT (OUTPATIENT)
Dept: URGENT CARE | Facility: URGENT CARE | Age: 62
End: 2017-11-30
Payer: COMMERCIAL

## 2017-11-30 VITALS
OXYGEN SATURATION: 92 % | TEMPERATURE: 97.6 F | HEART RATE: 87 BPM | WEIGHT: 161 LBS | SYSTOLIC BLOOD PRESSURE: 137 MMHG | BODY MASS INDEX: 30.42 KG/M2 | DIASTOLIC BLOOD PRESSURE: 84 MMHG

## 2017-11-30 DIAGNOSIS — R07.0 THROAT PAIN: Primary | ICD-10-CM

## 2017-11-30 PROCEDURE — 99213 OFFICE O/P EST LOW 20 MIN: CPT | Performed by: PHYSICIAN ASSISTANT

## 2017-11-30 PROCEDURE — 87880 STREP A ASSAY W/OPTIC: CPT | Performed by: PHYSICIAN ASSISTANT

## 2017-11-30 PROCEDURE — 87081 CULTURE SCREEN ONLY: CPT | Performed by: PHYSICIAN ASSISTANT

## 2017-11-30 RX ORDER — MONTELUKAST SODIUM 10 MG/1
1 TABLET ORAL DAILY
Refills: 5 | COMMUNITY
Start: 2017-11-16

## 2017-11-30 ASSESSMENT — ENCOUNTER SYMPTOMS
NEUROLOGICAL NEGATIVE: 1
GASTROINTESTINAL NEGATIVE: 1
RESPIRATORY NEGATIVE: 1
MUSCULOSKELETAL NEGATIVE: 1
EYES NEGATIVE: 1
CONSTITUTIONAL NEGATIVE: 1
PSYCHIATRIC NEGATIVE: 1
CARDIOVASCULAR NEGATIVE: 1

## 2017-11-30 ASSESSMENT — PAIN SCALES - GENERAL: PAINLEVEL: SEVERE PAIN (6)

## 2017-11-30 NOTE — MR AVS SNAPSHOT
After Visit Summary   11/30/2017    Lesly Campbell    MRN: [id number]           Patient Information     Date Of Birth          1955        Visit Information        Provider Department      11/30/2017 12:40 PM Estephanie Hua PA-C [facility]        Today's Diagnoses     Throat pain    -  1       Follow-ups after your visit        Your next 10 appointments already scheduled     Dec 28, 2017  1:00 PM CST   LAB with LAB FIRST FLOOR [facility])    [address] [place] [id number] [phone]           Please do not eat 10-12 hours before your appointment if you are coming in fasting for labs on lipids, cholesterol, or glucose (sugar). This does not apply to pregnant women. Water, hot tea and black coffee (with nothing added) are okay. Do not drink other fluids, diet soda or chew gum.            Dec 28, 2017  1:40 PM CST   New Visit with Darryl Ibarra MD   [facility])    [address] [place] [address] [phone]            Dec 28, 2017  2:30 PM CST   Return Visit with Aida Meza MD   [facility])    [address] [place] [address] [phone]              Who to contact     If you have questions or need follow up information about today's clinic visit or your schedule please contact [facility] directly at [phone].  Normal or non-critical lab and imaging results will be communicated to you by MyChart, letter or phone within 4 business days after the clinic has received the results. If you do not hear from us within 7 days, please contact the clinic through MyChart or phone. If you have a critical or abnormal lab result, we will notify you by phone as soon as possible.  Submit refill requests through [employer] or call your pharmacy and they  "will forward the refill request to us. Please allow 3 business days for your refill to be completed.          Additional Information About Your Visit        FlatoraharInaika Information     smsPREP lets you send messages to your doctor, view your test results, renew your prescriptions, schedule appointments and more. To sign up, go to [email or web address] . Click on \"Log in\" on the left side of the screen, which will take you to the Welcome page. Then click on \"Sign up Now\" on the right side of the page.     You will be asked to enter the access code listed below, as well as some personal information. Please follow the directions to create your username and password.     Your access code is: [email or web address]  Expires: 2017  4:45 PM     Your access code will  in 90 days. If you need help or a new code, please call your [facility] clinic or [phone].        Care EveryWhere ID     This is your [facility] EveryWhere ID. This could be used by other organizations to access your [facility] medical records  [id number]        Your Vitals Were     Pulse Temperature Pulse Oximetry Breastfeeding? BMI (Body Mass Index)       87 97.6  F (36.4  C) (Oral) 92% No 30.42 kg/m2        Blood Pressure from Last 3 Encounters:   17 137/84   11/15/17 133/71   10/10/17 138/81    Weight from Last 3 Encounters:   17 161 lb (73 kg)   11/15/17 164 lb 12.8 oz (74.8 kg)   10/10/17 165 lb 9.6 oz (75.1 kg)              We Performed the Following     Beta strep group A culture     Rapid strep screen        Primary Care Provider Office Phone # Fax #    Janessa Farias [phone]        [facility] [address] [facility] [address]        Equal Access to Services     SHANNON GREENE : Colt Lopez, rachel choudhury, qatray crawford, chi falcon. So [facility] [phone].    ATENCIÓN: Si habla español, tiene a york disposición servicios gratuitos de asistencia " lingüística. Ritchie al [phone].    We comply with applicable federal civil rights laws and [place] laws. We do not discriminate on the basis of race, color, national origin, age, disability, sex, sexual orientation, or gender identity.            Thank you!     Thank you for choosing [facility]  for your care. Our goal is always to provide you with excellent care. Hearing back from our patients is one way we can continue to improve our services. Please take a few minutes to complete the written survey that you may receive in the mail after your visit with us. Thank you!             Your Updated Medication List - Protect others around you: Learn how to safely use, store and throw away your medicines at www.disposemymeds.org.          This list is accurate as of: 11/30/17  9:55 PM.  Always use your most recent med list.                   Brand Name Dispense Instructions for use Diagnosis    etanercept 50 MG/ML injection    ENBREL    4 Syringe    Inject 0.98 mLs (50 mg) Subcutaneous once a week Hold for signs of infection, and then seek medical attention.    Seropositive rheumatoid arthritis (H)       gabapentin 100 MG capsule    NEURONTIN     Take 100 mg by mouth 2 times daily as needed        ibuprofen 800 MG tablet    ADVIL/MOTRIN     Take 800 mg by mouth every 8 hours as needed for moderate pain        loratadine 10 MG tablet    CLARITIN      Seropositive rheumatoid arthritis (H)       metFORMIN 500 MG 24 hr tablet    GLUCOPHAGE-XR     Take 500 mg by mouth 2 times daily (with meals)        montelukast 10 MG tablet    SINGULAIR     Take 1 tablet by mouth daily        OMEPRAZOLE PO      Take 20 mg by mouth every morning        sodium chloride 0.65 % nasal spray    OCEAN     Spray 2 sprays in nostril daily as needed for congestion

## 2017-11-30 NOTE — LETTER
[facility]  [address]  [facility] [address]  Phone: [phone]    12/03/17    Lesly Campbell  [address]  [place] [address]      To whom it may concern:     The results of your recent lab results were normal. Enclosed are a copy of the results. Please call us with any questions/concerns regarding your results. Thank you for choosing [facility]. Have a great day!  Results for orders placed or performed in visit on 11/30/17   Rapid strep screen   Result Value Ref Range    Specimen Description Throat     Rapid Strep A Screen       NEGATIVE: No Group A streptococcal antigen detected by immunoassay, await culture report.   Beta strep group A culture   Result Value Ref Range    Specimen Description Throat     Culture Micro No beta hemolytic Streptococcus Group A isolated          Sincerely,      Estephanie Hua PA-C, PA-C

## 2017-11-30 NOTE — NURSING NOTE
"Chief Complaint   Patient presents with     Throat Pain     Pt c/o sore throat since Saturday.        Initial /84 (BP Location: Left arm, Patient Position: Chair, Cuff Size: Adult Regular)  Pulse 87  Temp 97.6  F (36.4  C) (Oral)  Wt 161 lb (73 kg)  SpO2 92%  Breastfeeding? No  BMI 30.42 kg/m2 Estimated body mass index is 30.42 kg/(m^2) as calculated from the following:    Height as of 10/10/17: 5' 1\" (1.549 m).    Weight as of this encounter: 161 lb (73 kg).  Medication Reconciliation: complete     Maddi Mcpherson CMA (AAMA)      "

## 2017-11-30 NOTE — PROGRESS NOTES
SUBJECTIVE:   Lesly Campbell is a 62 year old female presenting with a chief complaint of   Chief Complaint   Patient presents with     Throat Pain     Pt c/o sore throat since Saturday.    .    Onset of symptoms was 5 day(s) ago.  Course of illness is same.    Severity moderate  Current and Associated symptoms: sore throat  Treatment measures tried include Fluids and Rest, cough drops, ibuprofen.  Predisposing factors include None.    It is not improving  No runny nose, no postnasal drip  No fever or chills  Headache  No cough    Review of Systems   Constitutional: Negative.    HENT:        As in HPI   Eyes: Negative.    Respiratory: Negative.    Cardiovascular: Negative.    Gastrointestinal: Negative.    Genitourinary: Negative.    Musculoskeletal: Negative.    Skin: Negative.    Neurological: Negative.    Psychiatric/Behavioral: Negative.        Past Medical History:   Diagnosis Date     Elevated LFTs      Epigastric abdominal pain      Myalgia      Myositis      Neuropathy      Pharyngitis      Prediabetes      Rheumatoid arthritis (H)      Current Outpatient Prescriptions   Medication Sig Dispense Refill     montelukast (SINGULAIR) 10 MG tablet Take 1 tablet by mouth daily  5     loratadine (CLARITIN) 10 MG tablet        etanercept (ENBREL) 50 MG/ML injection Inject 0.98 mLs (50 mg) Subcutaneous once a week Hold for signs of infection, and then seek medical attention. 4 Syringe 5     ibuprofen (ADVIL/MOTRIN) 800 MG tablet Take 800 mg by mouth every 8 hours as needed for moderate pain       sodium chloride (OCEAN) 0.65 % nasal spray Spray 2 sprays in nostril daily as needed for congestion       metFORMIN (GLUCOPHAGE-XR) 500 MG 24 hr tablet Take 500 mg by mouth 2 times daily (with meals)        gabapentin (NEURONTIN) 100 MG capsule Take 100 mg by mouth 2 times daily as needed       OMEPRAZOLE PO Take 20 mg by mouth every morning       Social History   Substance Use Topics     Smoking status: Former Smoker      Quit date: 2008     Smokeless tobacco: Never Used     Alcohol use Yes       OBJECTIVE  /84 (BP Location: Left arm, Patient Position: Chair, Cuff Size: Adult Regular)  Pulse 87  Temp 97.6  F (36.4  C) (Oral)  Wt 161 lb (73 kg)  SpO2 92%  Breastfeeding? No  BMI 30.42 kg/m2    Physical Exam   Constitutional: She is oriented to person, place, and time and well-developed, well-nourished, and in no distress.   HENT:   Head: Normocephalic and atraumatic.   Right Ear: Ear canal normal. A middle ear effusion is present.   Left Ear: Ear canal normal. A middle ear effusion is present.   Nose: Nose normal.   Mouth/Throat: Uvula is midline and mucous membranes are normal. Posterior oropharyngeal erythema present. No oropharyngeal exudate or posterior oropharyngeal edema.   Eyes: Conjunctivae and EOM are normal. Pupils are equal, round, and reactive to light.   Neck: Normal range of motion. Neck supple.   Cardiovascular: Normal rate, regular rhythm and normal heart sounds.    Pulmonary/Chest: Effort normal and breath sounds normal.   Neurological: She is alert and oriented to person, place, and time. Gait normal.   Skin: Skin is warm and dry.   Psychiatric: Mood and affect normal.       Labs:  No results found for this or any previous visit (from the past 24 hour(s)).        ASSESSMENT:      ICD-10-CM    1. Throat pain R07.0 Rapid strep screen     Beta strep group A culture        PLAN:    URI Adult:  Tylenol, Ibuprofen, Fluids and Rest    Followup:    If not improving or if condition worsens, follow up with your Primary Care Provider    There are no Patient Instructions on file for this visit.    Estephanie Hua PA-C

## 2017-12-01 ENCOUNTER — TELEPHONE (OUTPATIENT)
Dept: GASTROENTEROLOGY | Facility: CLINIC | Age: 62
End: 2017-12-01

## 2017-12-01 LAB
BACTERIA SPEC CULT: NORMAL
SPECIMEN SOURCE: NORMAL

## 2017-12-01 NOTE — TELEPHONE ENCOUNTER
PREVISIT INFORMATION                                                    Lesly ANDUJAR Adrian scheduled for future visit at [facility] specialty clinics.    Patient is scheduled to see Dr. Ibarra on 12/28  Reason for visit: elevated liver enzymes  Referring provider: Dr. Meza  Has patient seen previous specialist? No  Medical Records:  Available in chart.  Patient was previously seen at a [facility] or [facility] facility.     REVIEW                                                      New patient packet mailed to patient: Yes  Medication reconciliation complete: No      Current Outpatient Prescriptions   Medication Sig Dispense Refill     montelukast (SINGULAIR) 10 MG tablet Take 1 tablet by mouth daily  5     loratadine (CLARITIN) 10 MG tablet        etanercept (ENBREL) 50 MG/ML injection Inject 0.98 mLs (50 mg) Subcutaneous once a week Hold for signs of infection, and then seek medical attention. 4 Syringe 5     ibuprofen (ADVIL/MOTRIN) 800 MG tablet Take 800 mg by mouth every 8 hours as needed for moderate pain       sodium chloride (OCEAN) 0.65 % nasal spray Spray 2 sprays in nostril daily as needed for congestion       metFORMIN (GLUCOPHAGE-XR) 500 MG 24 hr tablet Take 500 mg by mouth 2 times daily (with meals)        gabapentin (NEURONTIN) 100 MG capsule Take 100 mg by mouth 2 times daily as needed       OMEPRAZOLE PO Take 20 mg by mouth every morning         Allergies: Prednisone and Sulfa drugs        PLAN/FOLLOW-UP NEEDED                                                      Previsit review complete.  Patient will see provider at future scheduled appointment.     Patient Reminders Given:  Please, make sure you bring an updated list of your medications.   If you are having a procedure, please, present 15 minutes early.  If you need to cancel or reschedule,please call [phone].    Opal Scruggs

## 2017-12-02 LAB
DEPRECATED S PYO AG THROAT QL EIA: NORMAL
SPECIMEN SOURCE: NORMAL

## 2017-12-18 ENCOUNTER — OFFICE VISIT (OUTPATIENT)
Dept: FAMILY MEDICINE | Facility: CLINIC | Age: 62
End: 2017-12-18
Payer: COMMERCIAL

## 2017-12-18 VITALS
BODY MASS INDEX: 31.37 KG/M2 | TEMPERATURE: 99 F | DIASTOLIC BLOOD PRESSURE: 77 MMHG | OXYGEN SATURATION: 95 % | HEART RATE: 92 BPM | SYSTOLIC BLOOD PRESSURE: 128 MMHG | WEIGHT: 166 LBS

## 2017-12-18 DIAGNOSIS — R09.81 CONGESTION OF PARANASAL SINUS: ICD-10-CM

## 2017-12-18 DIAGNOSIS — R07.0 THROAT PAIN: Primary | ICD-10-CM

## 2017-12-18 LAB
DEPRECATED S PYO AG THROAT QL EIA: NORMAL
SPECIMEN SOURCE: NORMAL

## 2017-12-18 PROCEDURE — 87081 CULTURE SCREEN ONLY: CPT | Performed by: FAMILY MEDICINE

## 2017-12-18 PROCEDURE — 99213 OFFICE O/P EST LOW 20 MIN: CPT | Performed by: FAMILY MEDICINE

## 2017-12-18 PROCEDURE — 87880 STREP A ASSAY W/OPTIC: CPT | Performed by: FAMILY MEDICINE

## 2017-12-18 RX ORDER — AMOXICILLIN 500 MG/1
1000 CAPSULE ORAL 2 TIMES DAILY
Qty: 40 CAPSULE | Refills: 0 | Status: SHIPPED | OUTPATIENT
Start: 2017-12-18 | End: 2017-12-28

## 2017-12-18 NOTE — MR AVS SNAPSHOT
After Visit Summary   12/18/2017    Lesly Campbell    MRN: [id number]           Patient Information     Date Of Birth          1955        Visit Information        Provider Department      12/18/2017 10:15 AM Luiz Olivares MD [facility]        Today's Diagnoses     Throat pain    -  1    Congestion of paranasal sinus           Follow-ups after your visit        Your next 10 appointments already scheduled     Dec 28, 2017  1:00 PM CST   LAB with LAB FIRST FLOOR [facility])    [address] [place] [address] [phone] [phone]           Please do not eat 10-12 hours before your appointment if you are coming in fasting for labs on lipids, cholesterol, or glucose (sugar). This does not apply to pregnant women. Water, hot tea and black coffee (with nothing added) are okay. Do not drink other fluids, diet soda or chew gum.            Dec 28, 2017  1:40 PM CST   New Visit with Darryl Ibarra MD   [facility])    [address] [place] [address] [phone]            Dec 28, 2017  2:30 PM CST   Return Visit with Aida Meza MD   [facility])    [address] [place] [phone] [phone]              Who to contact     If you have questions or need follow up information about today's clinic visit or your schedule please contact [facility] directly at [phone].  Normal or non-critical lab and imaging results will be communicated to you by MyChart, letter or phone within 4 business days after the clinic has received the results. If you do not hear from us within 7 days, please contact the clinic through MyChart or phone. If you have a critical or abnormal lab result, we will notify you by phone as soon as possible.  Submit refill requests through [employer] or call your  "pharmacy and they will forward the refill request to us. Please allow 3 business days for your refill to be completed.          Additional Information About Your Visit        MyChart Information     [employer] lets you send messages to your doctor, view your test results, renew your prescriptions, schedule appointments and more. To sign up, go to [email or web address] . Click on \"Log in\" on the left side of the screen, which will take you to the Welcome page. Then click on \"Sign up Now\" on the right side of the page.     You will be asked to enter the access code listed below, as well as some personal information. Please follow the directions to create your username and password.     Your access code is: [email or web address]  Expires: 2017  4:45 PM     Your access code will  in 90 days. If you need help or a new code, please call your [facility] clinic or [phone].        Care EveryWhere ID     This is your Care EveryWhere ID. This could be used by other organizations to access your [facility] medical records  [id number]        Your Vitals Were     Pulse Temperature Pulse Oximetry BMI (Body Mass Index)          92 99  F (37.2  C) (Oral) 95% 31.37 kg/m2         Blood Pressure from Last 3 Encounters:   17 128/77   17 137/84   11/15/17 133/71    Weight from Last 3 Encounters:   17 166 lb (75.3 kg)   17 161 lb (73 kg)   11/15/17 164 lb 12.8 oz (74.8 kg)              We Performed the Following     Beta strep group A culture     Strep, Rapid Screen          Today's Medication Changes          These changes are accurate as of: 17 10:52 AM.  If you have any questions, ask your nurse or doctor.               Start taking these medicines.        Dose/Directions    amoxicillin 500 MG capsule   Commonly known as:  AMOXIL   Used for:  Congestion of paranasal sinus   Started by:  Luiz Olivares MD        Dose:  1000 mg   Take 2 capsules (1,000 mg) by mouth 2 times daily for 10 days " antibiotic   Quantity:  40 capsule   Refills:  0            Where to get your medicines      Some of these will need a paper prescription and others can be bought over the counter.  Ask your nurse if you have questions.     Bring a paper prescription for each of these medications     amoxicillin 500 MG capsule                Primary Care Provider Office Phone # Fax #    Janessa Farias [phone]        [place] [address] [facility] [address]        Equal Access to Services     SHANNON GREENE : Hadii aad ku hadasho Soomaali, waaxda luqadaha, qaybta kaalmada adeegyada, waxay idiin hayaan adebenjie menardrooseveltsh laromel . So [facility] [phone].    ATENCIÓN: Si habla español, tiene a york disposición servicios gratuitos de asistencia lingüística. Llame al [phone].    We comply with applicable federal civil rights laws and [place] laws. We do not discriminate on the basis of race, color, national origin, age, disability, sex, sexual orientation, or gender identity.            Thank you!     [facility]  [facility]. Our goal is always to provide you with excellent care. Hearing back from our patients is one way we can continue to improve our services. Please take a few minutes to complete the written survey that you may receive in the mail after your visit with us. Thank you!             Your Updated Medication List - Protect others around you: Learn how to safely use, store and throw away your medicines at www.disposemymeds.org.          This list is accurate as of: 12/18/17 10:52 AM.  Always use your most recent med list.                   Brand Name Dispense Instructions for use Diagnosis    amoxicillin 500 MG capsule    AMOXIL    40 capsule    Take 2 capsules (1,000 mg) by mouth 2 times daily for 10 days antibiotic    Congestion of paranasal sinus       etanercept 50 MG/ML injection    ENBREL    4 Syringe    Inject 0.98 mLs (50 mg) Subcutaneous  once a week Hold for signs of infection, and then seek medical attention.    Seropositive rheumatoid arthritis (H)       gabapentin 100 MG capsule    NEURONTIN     Take 100 mg by mouth 2 times daily as needed        ibuprofen 800 MG tablet    ADVIL/MOTRIN     Take 800 mg by mouth every 8 hours as needed for moderate pain        loratadine 10 MG tablet    CLARITIN      Seropositive rheumatoid arthritis (H)       metFORMIN 500 MG 24 hr tablet    GLUCOPHAGE-XR     Take 500 mg by mouth 2 times daily (with meals)        montelukast 10 MG tablet    SINGULAIR     Take 1 tablet by mouth daily        OMEPRAZOLE PO      Take 20 mg by mouth every morning        sodium chloride 0.65 % nasal spray    OCEAN     Spray 2 sprays in nostril daily as needed for congestion

## 2017-12-18 NOTE — NURSING NOTE
"Chief Complaint   Patient presents with     Pharyngitis     started 12-       Initial /77 (Cuff Size: Adult Regular)  Pulse 92  Temp 99  F (37.2  C) (Oral)  Wt 166 lb (75.3 kg)  SpO2 95%  BMI 31.37 kg/m2 Estimated body mass index is 31.37 kg/(m^2) as calculated from the following:    Height as of 10/10/17: 5' 1\" (1.549 m).    Weight as of this encounter: 166 lb (75.3 kg).  Medication Reconciliation: complete   Staff was masked during visit.    Teodora Ruiz LPN    "

## 2017-12-18 NOTE — PROGRESS NOTES
SUBJECTIVE:  Lesly Campbell, a 62 year old female scheduled an appointment to discuss the following issues:  Throat pain  Sick x3 days. Mild rhinorrha and cough. No nausea, vomiting or diarrhea. No gerd symptoms on prilosec. Low grade fevers. No ear pain.   History ALLERGIC RHINITIS - year round.   Medical, social, surgical, and family histories reviewed.    ROS:  All other ROS negative  OBJECTIVE:  /77 (Cuff Size: Adult Regular)  Pulse 92  Temp 99  F (37.2  C) (Oral)  Wt 166 lb (75.3 kg)  SpO2 95%  BMI 31.37 kg/m2  EXAM:  GENERAL APPEARANCE: healthy, alert and no distress  EYES: EOMI,  PERRL  HENT: ear canals and TM's normal and nose thick discharge and mouth without ulcers or lesions/mild erythema  NECK: no adenopathy, no asymmetry, masses, or scars and thyroid normal to palpation  RESP: lungs clear to auscultation - no rales, rhonchi or wheezes  CV: regular rates and rhythm, normal S1 S2, no S3 or S4 and no murmur, click or rub -  ABDOMEN:  soft, nontender, no HSM or masses and bowel sounds normal  MS: extremities normal- no gross deformities noted, no evidence of inflammation in joints, FROM in all extremities.  SKIN: no suspicious lesions or rashes  PSYCH: mentation appears normal and affect normal/bright    ASSESSMENT / PLAN:  (R07.0) Throat pain  (primary encounter diagnosis)  Comment: likely viral uri  Plan: Strep, Rapid Screen, Beta strep group A culture        Expected course and warning signs reviewed. Tylenol/rest/fluids. Expected course and warning signs reviewed. Call/email with questions/concerns     (R09.81) Congestion of paranasal sinus  Comment: history sinus infections but likely viral at this point  Plan: amoxicillin (AMOXIL) 500 MG capsule        HOLD amox - can take if worsening pressure/ fevers or chills/etc. Expected course and warning signs reviewed. Call/email with questions/concerns    Luiz Olivares MD

## 2017-12-18 NOTE — LETTER
December 19, 2017    Lesly Campbell  [address] [facility]  [place] [address]        Dear Lesly,    The results of your recent tests were normal.  Below is a copy of the results.  It was a pleasure to see you at your last appointment.    If you have any questions or concerns, please call myself or my nurse at [phone].    Sincerely,    Luiz Olivares MD/wing      Results for orders placed or performed in visit on 12/18/17   Strep, Rapid Screen   Result Value Ref Range    Specimen Description Throat     Rapid Strep A Screen       NEGATIVE: No Group A streptococcal antigen detected by immunoassay, await culture report.   Beta strep group A culture   Result Value Ref Range    Specimen Description Throat     Culture Micro No beta hemolytic Streptococcus Group A isolated

## 2017-12-18 NOTE — Clinical Note
Please abstract the following data from this visit with this patient into the appropriate field in Epic:  Mammogram done on this date: 06/01/2017 -normal- at (approximately), by this group:[facility]  results were normal.  Pap smear done on this date: 06/01/2017 (approximately), by this group: [facility], results were normal.  FIT testing done 06/01/2017- Normal- at [facility]

## 2017-12-19 LAB
BACTERIA SPEC CULT: NORMAL
SPECIMEN SOURCE: NORMAL

## 2017-12-27 ENCOUNTER — TELEPHONE (OUTPATIENT)
Dept: RHEUMATOLOGY | Facility: CLINIC | Age: 62
End: 2017-12-27

## 2017-12-27 NOTE — TELEPHONE ENCOUNTER
Form completed/signed by provider. See LOV, plan continue with Enbrel injections, if worsen to contact clinic.form faxed to [employer].     Next 5 appointments (look out 90 days)     Dec 28, 2017  2:30 PM CST   Return Visit with Aida Meza MD   [facility] ([facility])    [address] [place] [id number]   [phone]                    RYAN StephensN, RN, PHN  Rheumatology Care Coordinator    [facility]

## 2018-02-12 ENCOUNTER — OFFICE VISIT (OUTPATIENT)
Dept: OPTOMETRY | Facility: CLINIC | Age: 63
End: 2018-02-12
Payer: COMMERCIAL

## 2018-02-12 ENCOUNTER — APPOINTMENT (OUTPATIENT)
Dept: OPTOMETRY | Facility: CLINIC | Age: 63
End: 2018-02-12
Payer: COMMERCIAL

## 2018-02-12 DIAGNOSIS — H52.4 PRESBYOPIA: Primary | ICD-10-CM

## 2018-02-12 DIAGNOSIS — H18.413 CORNEAL ARCUS SENILIS, BILATERAL: ICD-10-CM

## 2018-02-12 DIAGNOSIS — H52.03 HYPEROPIA, BILATERAL: ICD-10-CM

## 2018-02-12 PROCEDURE — 92341 FIT SPECTACLES BIFOCAL: CPT | Performed by: OPTOMETRIST

## 2018-02-12 PROCEDURE — 92015 DETERMINE REFRACTIVE STATE: CPT | Performed by: OPTOMETRIST

## 2018-02-12 PROCEDURE — 92004 COMPRE OPH EXAM NEW PT 1/>: CPT | Performed by: OPTOMETRIST

## 2018-02-12 ASSESSMENT — VISUAL ACUITY
OS_SC+: -1
CORRECTION_TYPE: GLASSES
OD_SC: 20/400
OS_SC: 20/30
OD_SC: 20/40
OD_CC: 20/200
OD_SC+: -1
METHOD: SNELLEN - LINEAR
OD_PH_SC: 20/25-2
OS_CC: 20/50
OS_SC: 20/200

## 2018-02-12 ASSESSMENT — KERATOMETRY
OS_K1POWER_DIOPTERS: 45.75
OD_K2POWER_DIOPTERS: 45.50
OD_K1POWER_DIOPTERS: 46.50
OS_K2POWER_DIOPTERS: 46.25
OD_AXISANGLE2_DEGREES: 40
OS_AXISANGLE2_DEGREES: 32

## 2018-02-12 ASSESSMENT — REFRACTION_WEARINGRX
OD_SPHERE: +2.00
SPECS_TYPE: BIFOCAL
OS_SPHERE: +2.00
SPECS_TYPE: OTC'S

## 2018-02-12 ASSESSMENT — CONF VISUAL FIELD
OD_NORMAL: 1
OS_NORMAL: 1
METHOD: COUNTING FINGERS

## 2018-02-12 ASSESSMENT — REFRACTION_MANIFEST
OS_ADD: +2.50
OS_SPHERE: +0.50
OD_AXIS: 059
OS_AXIS: 090
OS_CYLINDER: SPHERE
OS_SPHERE: +0.25
OD_SPHERE: +0.50
OD_CYLINDER: SPHERE
OD_SPHERE: +1.25
OS_CYLINDER: +0.25
OD_CYLINDER: +0.50
OD_ADD: +2.50
METHOD_AUTOREFRACTION: 1

## 2018-02-12 ASSESSMENT — EXTERNAL EXAM - LEFT EYE: OS_EXAM: NORMAL

## 2018-02-12 ASSESSMENT — EXTERNAL EXAM - RIGHT EYE: OD_EXAM: NORMAL

## 2018-02-12 ASSESSMENT — CUP TO DISC RATIO
OD_RATIO: 0.2
OS_RATIO: 0.2

## 2018-02-12 ASSESSMENT — TONOMETRY
IOP_METHOD: APPLANATION
OS_IOP_MMHG: 17
OD_IOP_MMHG: 17

## 2018-02-12 ASSESSMENT — SLIT LAMP EXAM - LIDS
COMMENTS: NORMAL
COMMENTS: NORMAL

## 2018-02-12 NOTE — LETTER
2/12/2018         RE: Lesly Campbell  [address] [facility] [address]        Dear Colleague,    Thank you for referring your patient, Lesly Campbell, to the [facility]. Please see a copy of my visit note below.    Chief Complaint   Patient presents with     COMPREHENSIVE EYE EXAM     New to Eye Dept        Here with daughter, out in [place]. Also Pre-Diabetic.     Lab Results   Component Value Date    A1C 5.9 05/23/2017         Last Eye Exam: 3 years ago   Dilated Previously: Yes    What are you currently using to see?  Has Rx Bifocals that she uses when her eyes are tired or she's driving. She also wears OTC readers usually. She did not bring the Rx pair today. Bifocals not comfortable for reading        Distance Vision Acuity: Satisfied with vision, no changes noted.     Near Vision Acuity: Patient thinks that there may be some changes to her near vision. She uses the OTC readers for reading and computer.     Eye Comfort: Eyes get tired and she has concerns about her eyes changing colors on the top of the colored part of her eye.   Do you use eye drops? : Yes: Uses an artificial tear as needed   Occupation or Hobbies: Retired - thinking of going back to work using computer    Brianna Apple Optometric Assistant           Medical, surgical and family histories reviewed and updated 2/12/2018.       OBJECTIVE: See Ophthalmology exam    ASSESSMENT:    ICD-10-CM    1. Presbyopia H52.4 EYE EXAM (SIMPLE-NONBILLABLE)     REFRACTION   2. Hyperopia, bilateral H52.03 EYE EXAM (SIMPLE-NONBILLABLE)     REFRACTION   3. 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  Fill glasses prescription  Allow 2 weeks to adapt to change in glasses  Use over the counter artificial tears 2 times a day as needed (Blink Tears, Systane Ultra or Refresh Optive)  Return in 1 year for eye  exam    Aleena Guaman O.D.  [facility]   [address] [place] [address] [phone]           Again, thank you for allowing me to participate in the care of your patient.        Sincerely,        Aleena Guaman, OD

## 2018-02-12 NOTE — PATIENT INSTRUCTIONS
Patient was advised of today's exam findings.  Fill glasses prescription  Allow 2 weeks to adapt to change in glasses  Use over the counter artificial tears 2 times a day as needed (Blink Tears, Systane Ultra or Refresh Optive)  Return in 1 year for eye exam    Aleena Guaman O.D.  [facility]   [address] [place] [address] [phone]

## 2018-02-12 NOTE — PROGRESS NOTES
Chief Complaint   Patient presents with     COMPREHENSIVE EYE EXAM     New to Eye Dept        Here with daughter, out in [facility]. Also Pre-Diabetic.     Lab Results   Component Value Date    A1C 5.9 05/23/2017         Last Eye Exam: 3 years ago   Dilated Previously: Yes    What are you currently using to see?  Has Rx Bifocals that she uses when her eyes are tired or she's driving. She also wears OTC readers usually. She did not bring the Rx pair today. Bifocals not comfortable for reading        Distance Vision Acuity: Satisfied with vision, no changes noted.     Near Vision Acuity: Patient thinks that there may be some changes to her near vision. She uses the OTC readers for reading and computer.     Eye Comfort: Eyes get tired and she has concerns about her eyes changing colors on the top of the colored part of her eye.   Do you use eye drops? : Yes: Uses an artificial tear as needed   Occupation or Hobbies: Retired - thinking of going back to work using computer    Brianna Apple Optometric Assistant           Medical, surgical and family histories reviewed and updated 2/12/2018.       OBJECTIVE: See Ophthalmology exam    ASSESSMENT:    ICD-10-CM    1. Presbyopia H52.4 EYE EXAM (SIMPLE-NONBILLABLE)     REFRACTION   2. Hyperopia, bilateral H52.03 EYE EXAM (SIMPLE-NONBILLABLE)     REFRACTION   3. 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  Fill glasses prescription  Allow 2 weeks to adapt to change in glasses  Use over the counter artificial tears 2 times a day as needed (Blink Tears, Systane Ultra or Refresh Optive)  Return in 1 year for eye exam    Aleena Guaman O.D.  [facility]   [address] [place], [place] [id number] [phone]

## 2018-02-12 NOTE — MR AVS SNAPSHOT
After Visit Summary   2/12/2018    Lesly Campbell    MRN: [id number]           Patient Information     Date Of Birth          1955        Visit Information        Provider Department      2/12/2018 12:00 PM Aleena Guaman, [facility]        Today's Diagnoses     Presbyopia    -  1      Care Instructions    Patient was advised of today's exam findings.  Fill glasses prescription  Allow 2 weeks to adapt to change in glasses  Use over the counter artificial tears 2 times a day as needed (Blink Tears, Systane Ultra or Refresh Optive)  Return in 1 year for eye exam    Aleena Guaman O.D.  [facility]   [address] [place] [address]  [phone]            Follow-ups after your visit        Your next 10 appointments already scheduled     Apr 12, 2018  9:30 AM CDT   LAB with LAB FIRST FLOOR [facility])    [address] [place] [address] [phone]           Please do not eat 10-12 hours before your appointment if you are coming in fasting for labs on lipids, cholesterol, or glucose (sugar). This does not apply to pregnant women. Water, hot tea and black coffee (with nothing added) are okay. Do not drink other fluids, diet soda or chew gum.            Apr 12, 2018 10:00 AM CDT   New Visit with Darryl Ibarra MD   [facility])    [address] [place] [address] [phone]            Apr 12, 2018 11:00 AM CDT   Return Visit with Aida Meza MD   [facility])    [address] [place] [address] [phone]              Who to contact     If you have questions or need follow up information about today's clinic visit or your schedule please contact [facility] directly at [phone].  Normal or non-critical lab and imaging results will be  "communicated to you by MyChart, letter or phone within 4 business days after the clinic has received the results. If you do not hear from us within 7 days, please contact the clinic through [employer] or phone. If you have a critical or abnormal lab result, we will notify you by phone as soon as possible.  Submit refill requests through [employer] or call your pharmacy and they will forward the refill request to us. Please allow 3 business days for your refill to be completed.          Additional Information About Your Visit        Coupad Information     Coupad lets you send messages to your doctor, view your test results, renew your prescriptions, schedule appointments and more. To sign up, go to [email or web address] . Click on \"Log in\" on the left side of the screen, which will take you to the Welcome page. Then click on \"Sign up Now\" on the right side of the page.     You will be asked to enter the access code listed below, as well as some personal information. Please follow the directions to create your username and password.     Your access code is: [email or web address]  Expires: 2018 12:52 PM     Your access code will  in 90 days. If you need help or a new code, please call your [place] clinic or [phone].        Care EveryWhere ID     This is your Care EveryWhere ID. This could be used by other organizations to access your [place] medical records  [id number]         Blood Pressure from Last 3 Encounters:   17 128/77   17 137/84   11/15/17 133/71    Weight from Last 3 Encounters:   17 75.3 kg (166 lb)   17 73 kg (161 lb)   11/15/17 74.8 kg (164 lb 12.8 oz)              Today, you had the following     No orders found for display       Primary Care Provider Office Phone # Fax #    Janessa Farias [phone]       [facility] [address] [facility] [address]        Equal Access to Services     SHANNON GREENE AH: Hadii lauro Lopez, " wafcoda oumousai, qaybta kajt crawford, chi grubbsaainez ah. So [facility] [phone].    ATENCIÓN: Si reggie kne, tiene a york disposición servicios gratuitos de asistencia lingüística. Ritchie al [phone].    We comply with applicable federal civil rights laws and [place] laws. We do not discriminate on the basis of race, color, national origin, age, disability, sex, sexual orientation, or gender identity.            Thank you!     Thank you for choosing [facility]  for your care. Our goal is always to provide you with excellent care. Hearing back from our patients is one way we can continue to improve our services. Please take a few minutes to complete the written survey that you may receive in the mail after your visit with us. Thank you!             Your Updated Medication List - Protect others around you: Learn how to safely use, store and throw away your medicines at www.disposemymeds.org.          This list is accurate as of 2/12/18 12:52 PM.  Always use your most recent med list.                   Brand Name Dispense Instructions for use Diagnosis    etanercept 50 MG/ML injection    ENBREL    4 Syringe    Inject 0.98 mLs (50 mg) Subcutaneous once a week Hold for signs of infection, and then seek medical attention.    Seropositive rheumatoid arthritis (H)       gabapentin 100 MG capsule    NEURONTIN     Take 100 mg by mouth 2 times daily as needed        ibuprofen 800 MG tablet    ADVIL/MOTRIN     Take 800 mg by mouth every 8 hours as needed for moderate pain        loratadine 10 MG tablet    CLARITIN      Seropositive rheumatoid arthritis (H)       metFORMIN 500 MG 24 hr tablet    GLUCOPHAGE-XR     Take 500 mg by mouth 2 times daily (with meals)        montelukast 10 MG tablet    SINGULAIR     Take 1 tablet by mouth daily        OMEPRAZOLE PO      Take 20 mg by mouth every morning        sodium chloride 0.65 % nasal spray    OCEAN     Spray 2 sprays in nostril daily  as needed for congestion

## 2018-03-16 ENCOUNTER — TELEPHONE (OUTPATIENT)
Dept: RHEUMATOLOGY | Facility: CLINIC | Age: 63
End: 2018-03-16

## 2018-03-16 DIAGNOSIS — M05.9 SEROPOSITIVE RHEUMATOID ARTHRITIS (H): ICD-10-CM

## 2018-03-16 NOTE — TELEPHONE ENCOUNTER
Returned patient call. She states she hasnt received Enbrel yet from pharmacy. She called 2 weeks ago she thinks it was, and was told pharmacy is still waiting for authorization. Advised we dont have any PA pending and in fact the PA done previously for Enbrel was approved until 9/18, so there shouldn't be any issues, and she has remaining refills yet. She states she then remembers getting a call from the pharmacy, but didn't call them back yet. She will return call to them now and see if there is a question at the pharmacy.

## 2018-03-16 NOTE — TELEPHONE ENCOUNTER
Health Call Center    Phone Message    May a detailed message be left on voicemail: yes    Reason for Call: Medication Question or concern regarding medication   Prescription Clarification  Name of Medication: etanercept (ENBREL) 50 MG/ML injection [[id number]] (Order [id number])     Prescribing Provider: Dr. Meza   Pharmacy: [facility]   What on the order needs clarification? Needs Rx, PA is not needed and patient has been out of Rx to help expedite this request ok to get  Verbal Rx. Please advise.           Action Taken: Message routed to:  Adult Clinics: Rheumatology p [id number]

## 2018-03-16 NOTE — TELEPHONE ENCOUNTER
Speciality pharmacy is waiting for authorization to fill the Rx etanercept (ENBREL) 50 MG/ML injection [[id number]] (Order [id number]) . Patient is calling to follow up on this and would like a call back to discuss status. Please advise.

## 2018-03-20 ENCOUNTER — OFFICE VISIT (OUTPATIENT)
Dept: FAMILY MEDICINE | Facility: CLINIC | Age: 63
End: 2018-03-20
Payer: COMMERCIAL

## 2018-03-20 VITALS
TEMPERATURE: 97.4 F | HEART RATE: 104 BPM | SYSTOLIC BLOOD PRESSURE: 127 MMHG | BODY MASS INDEX: 29.64 KG/M2 | HEIGHT: 61 IN | WEIGHT: 157 LBS | DIASTOLIC BLOOD PRESSURE: 72 MMHG

## 2018-03-20 DIAGNOSIS — R06.83 SNORING: Primary | ICD-10-CM

## 2018-03-20 DIAGNOSIS — M05.9 SEROPOSITIVE RHEUMATOID ARTHRITIS (H): ICD-10-CM

## 2018-03-20 DIAGNOSIS — H93.13 TINNITUS, BILATERAL: ICD-10-CM

## 2018-03-20 DIAGNOSIS — R07.0 THROAT PAIN: ICD-10-CM

## 2018-03-20 PROCEDURE — 99214 OFFICE O/P EST MOD 30 MIN: CPT | Performed by: FAMILY MEDICINE

## 2018-03-20 PROCEDURE — 92551 PURE TONE HEARING TEST AIR: CPT | Performed by: FAMILY MEDICINE

## 2018-03-20 NOTE — PROGRESS NOTES
"  SUBJECTIVE:   Lesly Campbell is a 62 year old female who presents to clinic today for the following health issues:      Establish care    Sore throat x 1 month -6 weeks, snores  Pt notes she snores, notes sore throat in the morning  Also takes antihistamines, which can be drying. She takes those all year long.    Ringing in ears that does not go away  Pt does not have hearing loss.   Sometimes intensified at night  Has been going on for years.     Would like you to talk to specialist about getting off of enbrel and on to something else  Does not like to do the self injections.   It is working, not sure if she has side effects  Can refer to alternative rheumatologist if she would like        HEARING FREQUENCY    Right Ear:      1000 Hz RESPONSE- on Level: 40 db (Conditioning sound)   1000 Hz: RESPONSE- on Level:   20 db    2000 Hz: RESPONSE- on Level:   20 db    4000 Hz: RESPONSE- on Level:   20 db     Left Ear:      4000 Hz: RESPONSE- on Level:   20 db    2000 Hz: RESPONSE- on Level:   20 db    1000 Hz: RESPONSE- on Level:   20 db     500 Hz: RESPONSE- on Level: 20db    Right Ear:    500 Hz: RESPONSE- on Level:   20 db     Hearing Acuity: Pass      Problem list and histories reviewed & adjusted, as indicated.  Additional history: as documented    Labs reviewed in EPIC    Reviewed and updated as needed this visit by clinical staff  Tobacco  Allergies  Meds  Med Hx  Surg Hx  Fam Hx  Soc Hx      Reviewed and updated as needed this visit by Provider         ROS:  Constitutional, HEENT, cardiovascular, pulmonary, gi and gu systems are negative, except as otherwise noted.    OBJECTIVE:     /72  Pulse 104  Temp 97.4  F (36.3  C) (Oral)  Ht 5' 0.5\" (1.537 m)  Wt 157 lb (71.2 kg)  BMI 30.16 kg/m2  Body mass index is 30.16 kg/(m^2).  GENERAL: healthy, alert and no distress  EYES: Eyes grossly normal to inspection, PERRL and conjunctivae and sclerae normal  HENT: ear canals and TM's normal, nose and mouth " without ulcers or lesions  NECK: no adenopathy, no asymmetry, masses, or scars and thyroid normal to palpation  RESP: lungs clear to auscultation - no rales, rhonchi or wheezes  CV: regular rate and rhythm, normal S1 S2, no S3 or S4, no murmur, click or rub, no peripheral edema and peripheral pulses strong    Diagnostic Test Results:  none     ASSESSMENT/PLAN:     1. Snoring  May be contributing to her fatigue as well or could be autoimmune disease  - SLEEP EVALUATION & MANAGEMENT REFERRAL - ADULT -[facility]  [phone] (Age 15 and up); Future    2. Seropositive rheumatoid arthritis (H)  As above    3. Tinnitus, bilateral  As above, no obvious cause due to hearing loss  - SCREENING TEST, PURE TONE, AIR ONLY  - OTOLARYNGOLOGY REFERRAL    4. Throat pain  Suspect due to dryness/snoring. Start with sleep study first  - OTOLARYNGOLOGY REFERRAL    5. HCD (health care directive)            Candice Haskins MD  [facility]

## 2018-03-20 NOTE — MR AVS SNAPSHOT
After Visit Summary   3/20/2018    Lesly Campbell    MRN: [id number]           Patient Information     Date Of Birth          1955        Visit Information        Provider Department      3/20/2018 11:20 AM Candice Jackson MD [facility]        Today's Diagnoses     Snoring    -  1    HCD (health care directive)        Tinnitus, bilateral        Throat pain           Follow-ups after your visit        Additional Services     OTOLARYNGOLOGY REFERRAL       Your provider has referred you to: [facility]: [facility] [phone]  [email or web address]    Please be aware that coverage of these services is subject to the terms and limitations of your health insurance plan.  Call member services at your health plan with any benefit or coverage questions.      Please bring the following with you to your appointment:    (1) Any X-Rays, CTs or MRIs which have been performed.  Contact the facility where they were done to arrange for  prior to your scheduled appointment.   (2) List of current medications  (3) This referral request   (4) Any documents/labs given to you for this referral            SLEEP EVALUATION & MANAGEMENT REFERRAL - ADULT -[facility]  [phone] (Age 15 and up)       Please be aware that coverage of these services is subject to the terms and limitations of your health insurance plan.  Call member services at your health plan with any benefit or coverage questions.      Please bring the following to your appointment:    >>   List of current medications   >>   This referral request   >>   Any documents/labs given to you for this referral                      Your next 10 appointments already scheduled     Apr 12, 2018  9:30 AM CDT   LAB with LAB FIRST FLOOR [facility])    [address] [place] [address] [phone]            Please do not eat 10-12 hours before your appointment if you are coming in fasting for labs on lipids, cholesterol, or glucose (sugar). This does not apply to pregnant women. Water, hot tea and black coffee (with nothing added) are okay. Do not drink other fluids, diet soda or chew gum.            Apr 12, 2018 10:00 AM CDT   New Visit [facility]    [address] [place] [address] [phone]              Future tests that were ordered for you today     Open Future Orders        Priority Expected Expires Ordered    SLEEP EVALUATION & MANAGEMENT REFERRAL - [facility]  [phone] (Age 15 and up) Routine  3/20/2019 3/20/2018            Who to contact     If you have questions or need follow up information about today's clinic visit or your schedule please contact [facility] directly at [phone].  Normal or non-critical lab and imaging results will be communicated to you by Omisehart, letter or phone within 4 business days after the clinic has received the results. If you do not hear from us within 7 days, please contact the clinic through [employer] or phone. If you have a critical or abnormal lab result, we will notify you by phone as soon as possible.  Submit refill requests through [employer] or call your pharmacy and they will forward the refill request to us. Please allow 3 business days for your refill to be completed.          Additional Information About Your Visit        [employer] Information     [employer] gives you secure access to your electronic health record. If you see a primary care provider, you can also send messages to your care team and make appointments. If you have questions, please call your primary care clinic.  If you do not have a primary care provider, please call [phone] and they will assist you.        Care EveryWhere ID     This is your Care EveryWhere  "ID. This could be used by other organizations to access your [place] medical records  [id number]        Your Vitals Were     Pulse Temperature Height BMI (Body Mass Index)          104 97.4  F (36.3  C) (Oral) 5' 0.5\" (1.537 m) 30.16 kg/m2         Blood Pressure from Last 3 Encounters:   03/20/18 127/72   12/18/17 128/77   11/30/17 137/84    Weight from Last 3 Encounters:   03/20/18 157 lb (71.2 kg)   12/18/17 166 lb (75.3 kg)   11/30/17 161 lb (73 kg)              We Performed the Following     OTOLARYNGOLOGY REFERRAL     SCREENING TEST, PURE TONE, AIR ONLY        Primary Care Provider Office Phone # Fax #    Janessa Farias [phone]        [place] [address] [place] [address]        Equal Access to Services     SHANNON GREENE : Hadii aad ku hadasho Soomaali, waaxda luqadaha, qaybta kaalmada adeegyada, waxay idiin hayolivian debi najera . So [facility] [phone].    ATENCIÓN: Si habla español, tiene a york disposición servicios gratuitos de asistencia lingüística. Ritchie al [phone].    We comply with applicable federal civil rights laws and [place] laws. We do not discriminate on the basis of race, color, national origin, age, disability, sex, sexual orientation, or gender identity.            Thank you!     Thank you for choosing [facility]  for your care. Our goal is always to provide you with excellent care. Hearing back from our patients is one way we can continue to improve our services. Please take a few minutes to complete the written survey that you may receive in the mail after your visit with us. Thank you!             Your Updated Medication List - Protect others around you: Learn how to safely use, store and throw away your medicines at www.disposemymeds.org.          This list is accurate as of 3/20/18 11:57 AM.  Always use your most recent med list.                   Brand Name Dispense Instructions for use Diagnosis    etanercept 50 " MG/ML injection    ENBREL    4 Syringe    Inject 0.98 mLs (50 mg) Subcutaneous once a week Hold for signs of infection, and then seek medical attention.    Seropositive rheumatoid arthritis (H)       gabapentin 100 MG capsule    NEURONTIN     Take 100 mg by mouth 2 times daily as needed        ibuprofen 800 MG tablet    ADVIL/MOTRIN     Take 800 mg by mouth every 8 hours as needed for moderate pain        loratadine 10 MG tablet    CLARITIN      Seropositive rheumatoid arthritis (H)       metFORMIN 500 MG 24 hr tablet    GLUCOPHAGE-XR     Take 500 mg by mouth 2 times daily (with meals)        montelukast 10 MG tablet    SINGULAIR     Take 1 tablet by mouth daily        OMEPRAZOLE PO      Take 20 mg by mouth every morning        sodium chloride 0.65 % nasal spray    OCEAN     Spray 2 sprays in nostril daily as needed for congestion

## 2018-03-21 PROBLEM — H93.13 TINNITUS, BILATERAL: Status: ACTIVE | Noted: 2018-03-21

## 2018-03-21 ASSESSMENT — PATIENT HEALTH QUESTIONNAIRE - PHQ9: SUM OF ALL RESPONSES TO PHQ QUESTIONS 1-9: 5

## 2018-03-29 ENCOUNTER — TELEPHONE (OUTPATIENT)
Dept: OPTOMETRY | Facility: CLINIC | Age: 63
End: 2018-03-29

## 2018-03-29 DIAGNOSIS — H04.123 DRY EYES: Primary | ICD-10-CM

## 2018-03-29 RX ORDER — POLYVINYL ALCOHOL 14 MG/ML
1 SOLUTION/ DROPS OPHTHALMIC PRN
Qty: 15 ML | Refills: 3 | Status: SHIPPED | OUTPATIENT
Start: 2018-03-29 | End: 2018-10-04

## 2018-03-29 NOTE — TELEPHONE ENCOUNTER
She uses a dry eye drop but her bottle is . Can Dr. Guaman send a new rx for plain eye drops to the [facility].    Left message that generic artificial tears tears sent to pharmacy.     Aleena Guaman, OD

## 2018-04-12 ENCOUNTER — OFFICE VISIT (OUTPATIENT)
Dept: GASTROENTEROLOGY | Facility: CLINIC | Age: 63
End: 2018-04-12
Attending: STUDENT IN AN ORGANIZED HEALTH CARE EDUCATION/TRAINING PROGRAM
Payer: COMMERCIAL

## 2018-04-12 VITALS
HEIGHT: 61 IN | OXYGEN SATURATION: 98 % | HEART RATE: 74 BPM | BODY MASS INDEX: 29.64 KG/M2 | DIASTOLIC BLOOD PRESSURE: 75 MMHG | WEIGHT: 157 LBS | SYSTOLIC BLOOD PRESSURE: 130 MMHG

## 2018-04-12 DIAGNOSIS — G89.29 CHRONIC MIDLINE LOW BACK PAIN WITH BILATERAL SCIATICA: ICD-10-CM

## 2018-04-12 DIAGNOSIS — M54.41 CHRONIC MIDLINE LOW BACK PAIN WITH BILATERAL SCIATICA: ICD-10-CM

## 2018-04-12 DIAGNOSIS — R74.8 ELEVATED LIVER ENZYMES: Primary | ICD-10-CM

## 2018-04-12 DIAGNOSIS — M05.9 SEROPOSITIVE RHEUMATOID ARTHRITIS (H): ICD-10-CM

## 2018-04-12 DIAGNOSIS — M54.42 CHRONIC MIDLINE LOW BACK PAIN WITH BILATERAL SCIATICA: ICD-10-CM

## 2018-04-12 DIAGNOSIS — J02.9 SORE THROAT: ICD-10-CM

## 2018-04-12 LAB
ALT SERPL W P-5'-P-CCNC: 88 U/L (ref 0–50)
AST SERPL W P-5'-P-CCNC: 71 U/L (ref 0–45)
BASOPHILS # BLD AUTO: 0.1 10E9/L (ref 0–0.2)
BASOPHILS NFR BLD AUTO: 1.5 %
CREAT SERPL-MCNC: 0.59 MG/DL (ref 0.52–1.04)
CRP SERPL-MCNC: <2.9 MG/L (ref 0–8)
DIFFERENTIAL METHOD BLD: NORMAL
EOSINOPHIL # BLD AUTO: 0.5 10E9/L (ref 0–0.7)
EOSINOPHIL NFR BLD AUTO: 5.8 %
ERYTHROCYTE [DISTWIDTH] IN BLOOD BY AUTOMATED COUNT: 14.6 % (ref 10–15)
ERYTHROCYTE [SEDIMENTATION RATE] IN BLOOD BY WESTERGREN METHOD: 10 MM/H (ref 0–30)
GFR SERPL CREATININE-BSD FRML MDRD: >90 ML/MIN/1.7M2
HCT VFR BLD AUTO: 41.6 % (ref 35–47)
HGB BLD-MCNC: 13.2 G/DL (ref 11.7–15.7)
IMM GRANULOCYTES # BLD: 0 10E9/L (ref 0–0.4)
IMM GRANULOCYTES NFR BLD: 0.2 %
LYMPHOCYTES # BLD AUTO: 2.7 10E9/L (ref 0.8–5.3)
LYMPHOCYTES NFR BLD AUTO: 33.7 %
MCH RBC QN AUTO: 29.1 PG (ref 26.5–33)
MCHC RBC AUTO-ENTMCNC: 31.7 G/DL (ref 31.5–36.5)
MCV RBC AUTO: 92 FL (ref 78–100)
MONOCYTES # BLD AUTO: 0.7 10E9/L (ref 0–1.3)
MONOCYTES NFR BLD AUTO: 8.2 %
NEUTROPHILS # BLD AUTO: 4.1 10E9/L (ref 1.6–8.3)
NEUTROPHILS NFR BLD AUTO: 50.6 %
PLATELET # BLD AUTO: 286 10E9/L (ref 150–450)
RBC # BLD AUTO: 4.54 10E12/L (ref 3.8–5.2)
WBC # BLD AUTO: 8.1 10E9/L (ref 4–11)

## 2018-04-12 PROCEDURE — 85025 COMPLETE CBC W/AUTO DIFF WBC: CPT | Performed by: STUDENT IN AN ORGANIZED HEALTH CARE EDUCATION/TRAINING PROGRAM

## 2018-04-12 PROCEDURE — 85652 RBC SED RATE AUTOMATED: CPT | Performed by: STUDENT IN AN ORGANIZED HEALTH CARE EDUCATION/TRAINING PROGRAM

## 2018-04-12 PROCEDURE — 86140 C-REACTIVE PROTEIN: CPT | Performed by: STUDENT IN AN ORGANIZED HEALTH CARE EDUCATION/TRAINING PROGRAM

## 2018-04-12 PROCEDURE — 84460 ALANINE AMINO (ALT) (SGPT): CPT | Performed by: STUDENT IN AN ORGANIZED HEALTH CARE EDUCATION/TRAINING PROGRAM

## 2018-04-12 PROCEDURE — 99204 OFFICE O/P NEW MOD 45 MIN: CPT | Performed by: INTERNAL MEDICINE

## 2018-04-12 PROCEDURE — 87070 CULTURE OTHR SPECIMN AEROBIC: CPT | Performed by: INTERNAL MEDICINE

## 2018-04-12 PROCEDURE — 82565 ASSAY OF CREATININE: CPT | Performed by: STUDENT IN AN ORGANIZED HEALTH CARE EDUCATION/TRAINING PROGRAM

## 2018-04-12 PROCEDURE — 36415 COLL VENOUS BLD VENIPUNCTURE: CPT | Performed by: STUDENT IN AN ORGANIZED HEALTH CARE EDUCATION/TRAINING PROGRAM

## 2018-04-12 PROCEDURE — 84450 TRANSFERASE (AST) (SGOT): CPT | Performed by: STUDENT IN AN ORGANIZED HEALTH CARE EDUCATION/TRAINING PROGRAM

## 2018-04-12 RX ORDER — ACETAMINOPHEN 325 MG/1
650 TABLET ORAL EVERY 6 HOURS PRN
Qty: 100 TABLET | Refills: 3 | Status: SHIPPED | OUTPATIENT
Start: 2018-04-12

## 2018-04-12 ASSESSMENT — PAIN SCALES - GENERAL: PAINLEVEL: SEVERE PAIN (7)

## 2018-04-12 NOTE — MR AVS SNAPSHOT
After Visit Summary   4/12/2018    Lesly Campbell    MRN: [id number]           Patient Information     Date Of Birth          1955        Visit Information        Provider Department      4/12/2018 10:00 AM Darryl Ibarra MD [facility]        Today's Diagnoses     Elevated liver enzymes    -  1    Chronic midline low back pain with bilateral sciatica        Sore throat           Follow-ups after your visit        Your next 10 appointments already scheduled     Apr 18, 2018 11:00 AM CDT   Return Visit with Aida Meza MD   [facility] ([facility])    [address] [place] [address] [phone]              Future tests that were ordered for you today     Open Future Orders        Priority Expected Expires Ordered    MRI Abdomen w & w/o contrast mrcp Routine  5/12/2018 4/12/2018            Who to contact     If you have questions or need follow up information about today's clinic visit or your schedule please contact [facility] directly at [phone].  Normal or non-critical lab and imaging results will be communicated to you by [employer], letter or phone within 4 business days after the clinic has received the results. If you do not hear from us within 7 days, please contact the clinic through [employer] or phone. If you have a critical or abnormal lab result, we will notify you by phone as soon as possible.  Submit refill requests through [employer] or call your pharmacy and they will forward the refill request to us. Please allow 3 business days for your refill to be completed.          Additional Information About Your Visit        [employer] Information     [employer] gives you secure access to your electronic health record. If you see a primary care provider, you can also send messages to your care team and make appointments. If you have questions, please call your primary care clinic.  If you do not have a  "primary care provider, please call [phone] and they will assist you.      [employer] is an electronic gateway that provides easy, online access to your medical records. With [employer], you can request a clinic appointment, read your test results, renew a prescription or communicate with your care team.     To access your existing account, please contact your [facility] or call [phone] for assistance.        Care EveryWhere ID     This is your Care EveryWhere ID. This could be used by other organizations to access your [place] medical records  [id number]        Your Vitals Were     Pulse Height Pulse Oximetry BMI (Body Mass Index)          74 1.537 m (5' 0.5\") 98% 30.16 kg/m2         Blood Pressure from Last 3 Encounters:   04/12/18 130/75   03/20/18 127/72   12/18/17 128/77    Weight from Last 3 Encounters:   04/12/18 71.2 kg (157 lb)   03/20/18 71.2 kg (157 lb)   12/18/17 75.3 kg (166 lb)              We Performed the Following     BIOPSY LIVER NEEDLE PERCUTANEOUS     Throat Culture Aerobic Bacterial          Today's Medication Changes          These changes are accurate as of 4/12/18 11:14 AM.  If you have any questions, ask your nurse or doctor.               Start taking these medicines.        Dose/Directions    acetaminophen 325 MG tablet   Commonly known as:  TYLENOL   Used for:  Chronic midline low back pain with bilateral sciatica   Started by:  Darryl Ibarra MD        Dose:  650 mg   Take 2 tablets (650 mg) by mouth every 6 hours as needed for mild pain   Quantity:  100 tablet   Refills:  3            Where to get your medicines      These medications were sent to [place] [address] [place], [place] - [address]  [address] [facility] [address]     Phone:  [phone]     acetaminophen 325 MG tablet                Primary Care Provider Office Phone # Fax #    Janessa Farias [phone]        [facility] [address]  [facility] [address]        Equal Access to Services     SHANNON GREENE : Hadii aad ku hadbridgerhéctor Sodakotaali, waaxda luqadaha, qaybta kaalmachi roth. So [facility] [phone].    ATENCIÓN: Si habla español, tiene a york disposición servicios gratuitos de asistencia lingüística. Kiraname al [phone].    We comply with applicable federal civil rights laws and [place] laws. We do not discriminate on the basis of race, color, national origin, age, disability, sex, sexual orientation, or gender identity.            Thank you!     Thank you for choosing [facility]  for your care. Our goal is always to provide you with excellent care. Hearing back from our patients is one way we can continue to improve our services. Please take a few minutes to complete the written survey that you may receive in the mail after your visit with us. Thank you!             Your Updated Medication List - Protect others around you: Learn how to safely use, store and throw away your medicines at www.disposemymeds.org.          This list is accurate as of 4/12/18 11:14 AM.  Always use your most recent med list.                   Brand Name Dispense Instructions for use Diagnosis    acetaminophen 325 MG tablet    TYLENOL    100 tablet    Take 2 tablets (650 mg) by mouth every 6 hours as needed for mild pain    Chronic midline low back pain with bilateral sciatica       etanercept 50 MG/ML injection    ENBREL    4 Syringe    Inject 0.98 mLs (50 mg) Subcutaneous once a week Hold for signs of infection, and then seek medical attention.    Seropositive rheumatoid arthritis (H)       gabapentin 100 MG capsule    NEURONTIN     Take 100 mg by mouth 2 times daily as needed        ibuprofen 800 MG tablet    ADVIL/MOTRIN     Take 800 mg by mouth every 8 hours as needed for moderate pain        loratadine 10 MG tablet    CLARITIN      Seropositive rheumatoid arthritis (H)       metFORMIN 500  MG 24 hr tablet    GLUCOPHAGE-XR     Take 500 mg by mouth 2 times daily (with meals)        montelukast 10 MG tablet    SINGULAIR     Take 1 tablet by mouth daily        OMEPRAZOLE PO      Take 20 mg by mouth every morning        polyvinyl alcohol 1.4 % ophthalmic solution    ARTIFICIAL TEARS    15 mL    Place 1 drop into both eyes as needed for dry eyes All right to substitute any artificial tear that is on formulary.  Use same sig.    Dry eyes       sodium chloride 0.65 % nasal spray    OCEAN     Spray 2 sprays in nostril daily as needed for congestion

## 2018-04-12 NOTE — PROGRESS NOTES
Hepatology Clinic note  Lesly Campbell   Date of Birth 1955  Date of Service 4/12/2018  Reason for consult: Elevated liver enzymes   Requesting provider: Dr. Aida Meza       Impression and plan:   Lesly Campbell is a 62 year old female with history of rheumatoid arthritis, diabetes, chronic back pain, chronically elevated liver enzymes  Multiple family members with with cirrhosis/HCC.  She presents for evaluation of elevated liver enzymes.    Has chronically elevated liver enzymes, hepatocellular pattern.Has intact synthetic function.  Abdominal ultrasound suggestive of diffuse fatty infiltration without signs of advanced liver disease or portal hypertension.    Differential diagnoses includes [place], autoimmune conditions, drug-induced liver injury, versus less likely alcohol or cardiac disease related hepatopathy.    - will complete workup of etiologies including serologic  - ordered MRI/MRCP  - Likely to get a liver biopsy if the above is unrevealing.  - minimize NSAIDs, can add Acetaminophen     RTC in 3-4 months or sooner as needed    Darryl Ibarra MD  [facility] Hepatology clinic    -----------------------------------------------------       HPI:   Lesly Campbell is a 62 year old female with history of rheumatoid arthritis, diabetes, chronic back pain, chronically elevated liver enzymes  Multiple family members with with cirrhosis/HCC.  She presents for evaluation of elevated liver enzymes.    Lesly reports having rheumatoid arthritis for the past 15 years.  It seems she was on number of medications in the past with limited success including methotrexate, however currently is controlled with Enbrel, with some inconsistencies.  She has diabetes with neuropathy, takes metformin, has relatively been well controlled  With a hemoglobin A1c less than 6%.    Reports being informed of abnormal liver enzymes for at least the past couple years. Initial workup including  imaging suggestive of fatty appearing liver.  The abnormalities in hepatocellular pattern, mild elevation,With normal synthetic function.  Denies any history of alcohol abuse.    Interestingly she has significant family history for liver disease including mother who  in her 40s after having an MI, but also had liver cancer, brother with cirrhosis who was an alcohol abuser and positive for hepatitis C, and nephew who is currently listed for liver transplantation with decompensated cirrhosis.    At this time she reports to be stable, and overall is doing OK, but has more issues related to back pain, some improvement with yoga   Otherwise denies jaundice, rash, abdominal pain, nausea vomiting, bleeding, confusion, falls, leg swelling, or abdominal distention.         Past Medical History:     Past Medical History:   Diagnosis Date     Elevated LFTs      Epigastric abdominal pain      Myalgia      Myositis      Neuropathy      Pharyngitis      Prediabetes      Rheumatoid arthritis (H)             Past Surgical History:     Past Surgical History:   Procedure Laterality Date     NO HISTORY OF SURGERY              Medications:     Current Outpatient Prescriptions   Medication     polyvinyl alcohol (ARTIFICIAL TEARS) 1.4 % ophthalmic solution     etanercept (ENBREL) 50 MG/ML injection     montelukast (SINGULAIR) 10 MG tablet     loratadine (CLARITIN) 10 MG tablet     ibuprofen (ADVIL/MOTRIN) 800 MG tablet     sodium chloride (OCEAN) 0.65 % nasal spray     metFORMIN (GLUCOPHAGE-XR) 500 MG 24 hr tablet     gabapentin (NEURONTIN) 100 MG capsule     OMEPRAZOLE PO     No current facility-administered medications for this visit.             Allergies:     Allergies   Allergen Reactions     Prednisone      bloating     Sulfa Drugs GI Disturbance     Antibiotics caused GI upset, diarrhea, vomiting, and cramps            Social History:      reports that she quit smoking about 10 years ago. She has never used smokeless tobacco.  "She reports that she drinks alcohol. She reports that she does not use illicit drugs.   has no sexual activity history on file.           Family History:     Family History   Problem Relation Age of Onset     Cirrhosis Mother      Liver Cancer Mother      Glaucoma Maternal Grandmother      Glaucoma Brother      Macular Degeneration No family hx of             Review of Systems:   10 points ROS was obtained and highlighted in the HPI, otherwise negative.          Physical Exam:   VS:  /75  Pulse 74  Ht 1.537 m (5' 0.5\")  Wt 71.2 kg (157 lb)  SpO2 98%  BMI 30.16 kg/m2      Gen: A&Ox3, NAD  HEENT: non-icteric, oropharynx clear  CV: RRR  Lung: CTAB  Abd: soft, NT, ND  Ext: no edema, intact pulses.   Skin: no stigmata of chronic liver disease.   Neuro: no focal deficits, grossly intact, no asterixis   Psych: appropriate mood and affects       Data:   Reviewed in person and significant for:  Lab Results   Component Value Date    WBC 8.1 04/12/2018     Lab Results   Component Value Date    RBC 4.54 04/12/2018     Lab Results   Component Value Date    HGB 13.2 04/12/2018     Lab Results   Component Value Date    HCT 41.6 04/12/2018     No components found for: MCT  Lab Results   Component Value Date    MCV 92 04/12/2018     Lab Results   Component Value Date    MCH 29.1 04/12/2018     Lab Results   Component Value Date    MCHC 31.7 04/12/2018     Lab Results   Component Value Date    RDW 14.6 04/12/2018     Lab Results   Component Value Date     04/12/2018     Last Basic Metabolic Panel:  No results found for: NA   Lab Results   Component Value Date    POTASSIUM 4.4 06/06/2017     No results found for: CHLORIDE  No results found for: JERARDO  No results found for: CO2  No results found for: BUN  Lab Results   Component Value Date    CR 0.59 04/12/2018     Lab Results   Component Value Date    GLC 90 06/06/2017     Liver Function Studies -   Recent Labs   Lab Test  04/12/18   0914  09/11/17   1035   PROTTOTAL   " --   7.4   ALBUMIN   --   3.8   BILITOTAL   --   0.3   ALKPHOS   --   63   AST  71*  43   ALT  88*  88*

## 2018-04-12 NOTE — NURSING NOTE
"Lesly Campbell's goals for this visit include:   Chief Complaint   Patient presents with     Consult     Liver enzyme Elevation       She requests these members of her care team be copied on today's visit information: yes    PCP: Janessa Farias    Referring Provider:  Aida Meza MD  [address] [place] [address]    Chief Complaint   Patient presents with     Consult     Liver enzyme Elevation       Initial /75  Pulse 74  Ht 1.537 m (5' 0.5\")  Wt 71.2 kg (157 lb)  SpO2 98%  BMI 30.16 kg/m2 Estimated body mass index is 30.16 kg/(m^2) as calculated from the following:    Height as of this encounter: 1.537 m (5' 0.5\").    Weight as of this encounter: 71.2 kg (157 lb).  Medication Reconciliation: complete    Do you need any medication refills at today's visit? New patient.     Amkulwinder Stokes CMA        "

## 2018-04-13 ENCOUNTER — TELEPHONE (OUTPATIENT)
Dept: GASTROENTEROLOGY | Facility: CLINIC | Age: 63
End: 2018-04-13

## 2018-04-13 DIAGNOSIS — R79.89 ELEVATED LFTS: Primary | ICD-10-CM

## 2018-04-13 NOTE — TELEPHONE ENCOUNTER
[facility] Call Center    Phone Message    May a detailed message be left on voicemail: yes    Reason for Call: Other: Needs order for biopsy sent to [facility]. please fax and update patient once sent     Action Taken: Message routed to:  Adult Clinics: Gastroenterology (GI) p [phone]

## 2018-04-13 NOTE — TELEPHONE ENCOUNTER
Nurse spoke to patient and updated that [facility] is hard to schedule with due to them wanting them to be in the system. She stated she would do [place], nurse called them and gave them the information. They will call the patient to schedule.  Patient notified.    Opal Scruggs RN, BSN, PHN  [facility]  GI/Gen Surg/Hepatology Care Coordinator

## 2018-04-14 LAB
BACTERIA SPEC CULT: NORMAL
Lab: NORMAL
SPECIMEN SOURCE: NORMAL

## 2018-04-16 ENCOUNTER — TELEPHONE (OUTPATIENT)
Dept: INTERVENTIONAL RADIOLOGY/VASCULAR | Facility: CLINIC | Age: 63
End: 2018-04-16

## 2018-04-16 NOTE — TELEPHONE ENCOUNTER
Interventional Radiology   Interventional Radiology at [facility] has been requested to perform a Random liver biopsy from Dr Ibarra.  Lesly Campbell is a 62 year old woman with a history of rheumatoid arthritis, pre diabetes, neuropathy, asthma and elevated liver enzymes since June of 2017. She has a fatty liver noted on abdominal ultrasound. She was sent to a hepatologist for further evaluation and part of the work up included a random liver biopsy.      Central scheduling has contacted the patient and scheduled the biopsy for 4/24/18.     Thanks [facility] Interventional Radiology CNP ([phone])

## 2018-04-18 ENCOUNTER — RADIANT APPOINTMENT (OUTPATIENT)
Dept: MRI IMAGING | Facility: CLINIC | Age: 63
End: 2018-04-18
Attending: INTERNAL MEDICINE
Payer: COMMERCIAL

## 2018-04-18 DIAGNOSIS — R74.8 ELEVATED LIVER ENZYMES: ICD-10-CM

## 2018-04-18 PROCEDURE — 74183 MRI ABD W/O CNTR FLWD CNTR: CPT | Performed by: RADIOLOGY

## 2018-04-18 PROCEDURE — A9585 GADOBUTROL INJECTION: HCPCS | Performed by: RADIOLOGY

## 2018-04-18 RX ORDER — GADOBUTROL 604.72 MG/ML
7.5 INJECTION INTRAVENOUS ONCE
Status: COMPLETED | OUTPATIENT
Start: 2018-04-18 | End: 2018-04-18

## 2018-04-18 RX ADMIN — GADOBUTROL 7.5 ML: 604.72 INJECTION INTRAVENOUS at 11:00

## 2018-04-24 ENCOUNTER — HOSPITAL ENCOUNTER (OUTPATIENT)
Dept: ULTRASOUND IMAGING | Facility: CLINIC | Age: 63
End: 2018-04-24
Attending: INTERNAL MEDICINE | Admitting: OBSTETRICS & GYNECOLOGY
Payer: COMMERCIAL

## 2018-04-24 ENCOUNTER — HOSPITAL ENCOUNTER (OUTPATIENT)
Facility: CLINIC | Age: 63
Discharge: HOME | End: 2018-04-24
Attending: OBSTETRICS & GYNECOLOGY | Admitting: OBSTETRICS & GYNECOLOGY
Payer: COMMERCIAL

## 2018-04-24 VITALS
OXYGEN SATURATION: 93 % | TEMPERATURE: 96.5 F | BODY MASS INDEX: 28.7 KG/M2 | SYSTOLIC BLOOD PRESSURE: 124 MMHG | WEIGHT: 162 LBS | DIASTOLIC BLOOD PRESSURE: 57 MMHG | HEART RATE: 78 BPM | RESPIRATION RATE: 16 BRPM | HEIGHT: 63 IN

## 2018-04-24 DIAGNOSIS — R74.8 ELEVATED LIVER ENZYMES: ICD-10-CM

## 2018-04-24 DIAGNOSIS — R74.8 ELEVATED LIVER ENZYMES: Primary | ICD-10-CM

## 2018-04-24 LAB — INR PPP: 0.87 (ref 0.86–1.14)

## 2018-04-24 PROCEDURE — 88313 SPECIAL STAINS GROUP 2: CPT | Mod: 26 | Performed by: OBSTETRICS & GYNECOLOGY

## 2018-04-24 PROCEDURE — 25000125 ZZHC RX 250: Performed by: RADIOLOGY

## 2018-04-24 PROCEDURE — 25000128 H RX IP 250 OP 636: Performed by: RADIOLOGY

## 2018-04-24 PROCEDURE — 88307 TISSUE EXAM BY PATHOLOGIST: CPT | Performed by: OBSTETRICS & GYNECOLOGY

## 2018-04-24 PROCEDURE — 85610 PROTHROMBIN TIME: CPT | Performed by: RADIOLOGY

## 2018-04-24 PROCEDURE — 40000863 ZZH STATISTIC RADIOLOGY XRAY, US, CT, MAR, NM

## 2018-04-24 PROCEDURE — 88307 TISSUE EXAM BY PATHOLOGIST: CPT | Mod: 26 | Performed by: OBSTETRICS & GYNECOLOGY

## 2018-04-24 PROCEDURE — 88313 SPECIAL STAINS GROUP 2: CPT | Performed by: OBSTETRICS & GYNECOLOGY

## 2018-04-24 PROCEDURE — 76942 ECHO GUIDE FOR BIOPSY: CPT

## 2018-04-24 RX ORDER — FLUMAZENIL 0.1 MG/ML
0.2 INJECTION, SOLUTION INTRAVENOUS
Status: DISCONTINUED | OUTPATIENT
Start: 2018-04-24 | End: 2018-04-24 | Stop reason: HOSPADM

## 2018-04-24 RX ORDER — LIDOCAINE 40 MG/G
CREAM TOPICAL
Status: DISCONTINUED | OUTPATIENT
Start: 2018-04-24 | End: 2018-04-24 | Stop reason: HOSPADM

## 2018-04-24 RX ORDER — FENTANYL CITRATE 50 UG/ML
25-50 INJECTION, SOLUTION INTRAMUSCULAR; INTRAVENOUS EVERY 5 MIN PRN
Status: DISCONTINUED | OUTPATIENT
Start: 2018-04-24 | End: 2018-04-24 | Stop reason: HOSPADM

## 2018-04-24 RX ORDER — LIDOCAINE HYDROCHLORIDE 10 MG/ML
5 INJECTION, SOLUTION EPIDURAL; INFILTRATION; INTRACAUDAL; PERINEURAL ONCE
Status: COMPLETED | OUTPATIENT
Start: 2018-04-24 | End: 2018-04-24

## 2018-04-24 RX ORDER — NALOXONE HYDROCHLORIDE 0.4 MG/ML
.1-.4 INJECTION, SOLUTION INTRAMUSCULAR; INTRAVENOUS; SUBCUTANEOUS
Status: DISCONTINUED | OUTPATIENT
Start: 2018-04-24 | End: 2018-04-24 | Stop reason: HOSPADM

## 2018-04-24 RX ADMIN — MIDAZOLAM HYDROCHLORIDE 0.5 MG: 1 INJECTION, SOLUTION INTRAMUSCULAR; INTRAVENOUS at 10:59

## 2018-04-24 RX ADMIN — FENTANYL CITRATE 25 MCG: 50 INJECTION INTRAMUSCULAR; INTRAVENOUS at 11:01

## 2018-04-24 RX ADMIN — FENTANYL CITRATE 25 MCG: 50 INJECTION INTRAMUSCULAR; INTRAVENOUS at 10:55

## 2018-04-24 RX ADMIN — LIDOCAINE HYDROCHLORIDE 5 ML: 10 INJECTION, SOLUTION EPIDURAL; INFILTRATION; INTRACAUDAL; PERINEURAL at 11:10

## 2018-04-24 RX ADMIN — MIDAZOLAM HYDROCHLORIDE 0.5 MG: 1 INJECTION, SOLUTION INTRAMUSCULAR; INTRAVENOUS at 10:54

## 2018-04-24 NOTE — PROCEDURES
RADIOLOGY PROCEDURE NOTE  Patient name: Lesly Campbell  MRN: [id number]  : 1955    Pre-procedure diagnosis: fatty liver  Post-procedure diagnosis: Same    Procedure Date/Time: 2018  11:01 AM  Procedure: US guided liver biopsy  Estimated blood loss: None  Specimen(s) collected with description: 18ga cores  The patient tolerated the procedure well with no immediate complications.  Significant findings:none    See imaging dictation for procedural details.    Provider name: Cheng Elliott  Assistant(s):None

## 2018-04-24 NOTE — IP AVS SNAPSHOT
[address] [place] [address]    Phone:  [phone]                                       After Visit Summary   4/24/2018    Lesly Campbell    MRN: [id number]           After Visit Summary Signature Page     I have received my discharge instructions, and my questions have been answered. I have discussed any challenges I see with this plan with the nurse or doctor.    ..........................................................................................................................................  Patient/Patient Representative Signature      ..........................................................................................................................................  Patient Representative Print Name and Relationship to Patient    ..................................................               ................................................  Date                                            Time    ..........................................................................................................................................  Reviewed by Signature/Title    ...................................................              ..............................................  Date                                                            Time

## 2018-04-24 NOTE — PROGRESS NOTES
Here with daughter for liver biopsy.  Explained biopsy and answered questions.  Gave d/c instructions with verbalized understanding.

## 2018-04-24 NOTE — PROGRESS NOTES
Liver bx performed in Ultrasound with Dr Elliott.  Tolerated well, VSS.  Band aid to site CDI.  Reports no discomfort post procedure.   Sedation given: Versed 1 mg, Fentanyl 50 mcg  Will cont to monitor.

## 2018-04-24 NOTE — IP AVS SNAPSHOT
MRN:[id number]                      After Visit Summary   4/24/2018    Lesly Campbell    MRN: [id number]           Visit Information        Department      4/24/2018  9:14 AM [facility]          Review of your medicines      UNREVIEWED medicines. Ask your doctor about these medicines        Dose / Directions    acetaminophen 325 MG tablet   Commonly known as:  TYLENOL   Used for:  Chronic midline low back pain with bilateral sciatica        Dose:  650 mg   Take 2 tablets (650 mg) by mouth every 6 hours as needed for mild pain   Quantity:  100 tablet   Refills:  3       etanercept 50 MG/ML injection   Commonly known as:  ENBREL   Used for:  Seropositive rheumatoid arthritis (H)        Dose:  50 mg   Inject 0.98 mLs (50 mg) Subcutaneous once a week Hold for signs of infection, and then seek medical attention.   Quantity:  4 Syringe   Refills:  5       gabapentin 100 MG capsule   Commonly known as:  NEURONTIN        Dose:  100 mg   Take 100 mg by mouth 2 times daily as needed   Refills:  0       ibuprofen 800 MG tablet   Commonly known as:  ADVIL/MOTRIN        Dose:  800 mg   Take 800 mg by mouth every 8 hours as needed for moderate pain   Refills:  0       loratadine 10 MG tablet   Commonly known as:  CLARITIN   Used for:  Seropositive rheumatoid arthritis (H)        daily   Refills:  0       metFORMIN 500 MG 24 hr tablet   Commonly known as:  GLUCOPHAGE-XR   Indication:  take 1 tablet daily for 1 week, then increase to 2 tablet daily with a meal        Dose:  500 mg   Take 500 mg by mouth 2 times daily (with meals)   Refills:  0       montelukast 10 MG tablet   Commonly known as:  SINGULAIR        Dose:  1 tablet   Take 1 tablet by mouth daily   Refills:  5       OMEPRAZOLE PO        Dose:  20 mg   Take 20 mg by mouth every morning   Refills:  0       polyvinyl alcohol 1.4 % ophthalmic solution   Commonly known as:  ARTIFICIAL TEARS   Used for:  Dry eyes        Dose:  1 drop    Place 1 drop into both eyes as needed for dry eyes All right to substitute any artificial tear that is on formulary.  Use same sig.   Quantity:  15 mL   Refills:  3       sodium chloride 0.65 % nasal spray   Commonly known as:  OCEAN        Dose:  2 spray   Spray 2 sprays in nostril daily as needed for congestion   Refills:  0                Protect others around you: Learn how to safely use, store and throw away your medicines at www.disposemymeds.org.         Follow-ups after your visit        Your next 10 appointments already scheduled     Apr 24, 2018 10:30 AM CDT   (Arrive by 9:00 AM)   US BIOPSY LIVER with  IMAGING NURSE, KARANUS4,  BODY RAD   [facility] ([facility])    [address] [place] [id number] [phone]           Tell us in advance if there s any chance you may be pregnant.  Bring a list of your medicines to the exam. Include vitamins, minerals and over-the-counter drugs.  No eating or drinking for 4 hours prior to exam.  If you take blood thinners, you may need to stop taking them a few days before treatment. Talk to your doctor before stopping these medicines. You will need a blood test the morning of your exam.   Stop taking Coumadin (warfarin) 3 days before your exam. Restart the day after your exam.   If you take aspirin, you may need to stop taking it 3 days before your scan.   If you take Plavix, Ticlid, Pletal or Persantine, you may need to stop taking them 5 days before your scan. Please talk to your doctor before stopping these medicines.  If you will receive sedation for this test (medicine to help you relax):   See your family doctor for an exam within 30 days of treatment.   Plan for an adult to drive you home and stay with you for at least 6 hours.   Follow the eating and drinking guidelines checked below:   No eating or drinking for 4 hours before your test. You may take medicine with small sips of water.   If you have diabetes:If  you take insulin, call your diabetes care team. Do not take diabetes pills on the morning of your test. If you take metformin (Avandamet, Glucophage, Glucovance, Metaglip) and received contrast, wait 48 hours before re-starting this medicine.  Please call the Imaging Department at your exam site with any questions.            Jul 12, 2018 11:40 AM CDT   Return Visit with Darryl Ibarra MD   [facility]    [address] [place] [id number] [phone]               Care Instructions        Further instructions from your care team       Liver Biopsy Discharge Instructions     After you go home:      You may resume your normal diet    Have an adult stay with you for 6 hours if you received sedation       For 24 hours - due to the sedation you received:    Relax and take it easy    Do NOT make any important or legal decisions    Do NOT drive or operate machines at home or at work    Do NOT drink alcohol    Care of Puncture Site:      For the first 48 hrs, check your puncture site every couple hours while you are awake     You may remove/change the bandaid tomorrow    You may shower tomorrow    No tub baths, whirlpools or swimming until your puncture site has fully healed    Activity       You may go back to normal activity in 24 hours     Wait 48 hours before lifting, straining, exercise or other strenuous activity    Medicines:      You may resume all medications    Resume your Warfarin/Coumadin at your regular dose today. Follow up with your provider to have your INR rechecked    Resume your Platelet Inhibitors and Aspirin tomorrow at your regular dose    For minor pain, you may take Acetaminophen (Tylenol) or Ibuprofen (Advil)            Call the provider who ordered this test if:      Increased pain or a large or growing hard lump around the site    Blood or fluid is draining from the site    The site is red, swollen, hot or tender    Chills or a fever  "greater than 101 F (38 C)    Pain that is getting worse    Any questions or concerns    Call  911 or go to the Emergency Room if:      Severe pain or trouble breathing    Bleeding that you cannot control        If you have questions call:          [facility] Radiology Dept @ [phone]      The provider who performed your procedure was Dr Elliott.     Additional Information About Your Visit        MyChart Information     [employer] gives you secure access to your electronic health record. If you see a primary care provider, you can also send messages to your care team and make appointments. If you have questions, please call your primary care clinic.  If you do not have a primary care provider, please call [phone] and they will assist you.        Care EveryWhere ID     This is your Care EveryWhere ID. This could be used by other organizations to access your [facility] medical records  [id number]        Your Vitals Were     Blood Pressure Pulse Temperature Respirations Height Weight    142/66 (BP Location: Left arm) 72 96.5  F (35.8  C) (Oral) 16 1.6 m (5' 3\") 73.5 kg (162 lb)    Pulse Oximetry BMI (Body Mass Index)                98% 28.7 kg/m2           Primary Care Provider Office Phone # Fax #    Janessa Farias [phone]      Equal Access to Services     SHANNON GREENE AH: Hadii aad ku hadasho Soomaali, waaxda luqadaha, qaybta kaalmada adeegyada, waxay idiin hayolivian debi mabry laromel falcon. So [facility] [phone].    ATENCIÓN: Si habla español, tiene a york disposición servicios gratuitos de asistencia lingüística. Llame al [phone].    We comply with applicable federal civil rights laws and [place] laws. We do not discriminate on the basis of race, color, national origin, age, disability, sex, sexual orientation, or gender identity.            Thank you!     Thank you for choosing [facility] for your care. Our goal is always to provide you with excellent care. Hearing back from our patients " is one way we can continue to improve our services. Please take a few minutes to complete the written survey that you may receive in the mail after you visit with us. Thank you!             Medication List: This is a list of all your medications and when to take them. Check marks below indicate your daily home schedule. Keep this list as a reference.      Medications           Morning Afternoon Evening Bedtime As Needed    acetaminophen 325 MG tablet   Commonly known as:  TYLENOL   Take 2 tablets (650 mg) by mouth every 6 hours as needed for mild pain                                etanercept 50 MG/ML injection   Commonly known as:  ENBREL   Inject 0.98 mLs (50 mg) Subcutaneous once a week Hold for signs of infection, and then seek medical attention.                                gabapentin 100 MG capsule   Commonly known as:  NEURONTIN   Take 100 mg by mouth 2 times daily as needed                                ibuprofen 800 MG tablet   Commonly known as:  ADVIL/MOTRIN   Take 800 mg by mouth every 8 hours as needed for moderate pain                                loratadine 10 MG tablet   Commonly known as:  CLARITIN   daily                                metFORMIN 500 MG 24 hr tablet   Commonly known as:  GLUCOPHAGE-XR   Take 500 mg by mouth 2 times daily (with meals)                                montelukast 10 MG tablet   Commonly known as:  SINGULAIR   Take 1 tablet by mouth daily                                OMEPRAZOLE PO   Take 20 mg by mouth every morning                                polyvinyl alcohol 1.4 % ophthalmic solution   Commonly known as:  ARTIFICIAL TEARS   Place 1 drop into both eyes as needed for dry eyes All right to substitute any artificial tear that is on formulary.  Use same sig.                                sodium chloride 0.65 % nasal spray   Commonly known as:  OCEAN   Spray 2 sprays in nostril daily as needed for congestion

## 2018-04-24 NOTE — DISCHARGE INSTRUCTIONS
Liver Biopsy Discharge Instructions     After you go home:      You may resume your normal diet    Have an adult stay with you for 6 hours if you received sedation       For 24 hours - due to the sedation you received:    Relax and take it easy    Do NOT make any important or legal decisions    Do NOT drive or operate machines at home or at work    Do NOT drink alcohol    Care of Puncture Site:      For the first 48 hrs, check your puncture site every couple hours while you are awake     You may remove/change the bandaid tomorrow    You may shower tomorrow    No tub baths, whirlpools or swimming until your puncture site has fully healed    Activity       You may go back to normal activity in 24 hours     Wait 48 hours before lifting, straining, exercise or other strenuous activity    Medicines:      You may resume all medications    Resume your Warfarin/Coumadin at your regular dose today. Follow up with your provider to have your INR rechecked    Resume your Platelet Inhibitors and Aspirin tomorrow at your regular dose    For minor pain, you may take Acetaminophen (Tylenol) or Ibuprofen (Advil)            Call the provider who ordered this test if:      Increased pain or a large or growing hard lump around the site    Blood or fluid is draining from the site    The site is red, swollen, hot or tender    Chills or a fever greater than 101 F (38 C)    Pain that is getting worse    Any questions or concerns    Call  911 or go to the Emergency Room if:      Severe pain or trouble breathing    Bleeding that you cannot control        If you have questions call:          [facility] Radiology Dept @ [phone]      The provider who performed your procedure was Dr Elliott.

## 2018-04-24 NOTE — PROGRESS NOTES
Patient ambulating.  Pain is minimal.  Patient encouraged to drink fluids and have a light meal upon arrival to home.  Patient refused tylenol/ibuprofen here but will take some when home. Patient refused any beverages post procedure here.  Patient getting dressed.  Family went to get the car from [facility].  Patient brought out in a wheelchair to door number 1.

## 2018-04-25 ENCOUNTER — TELEPHONE (OUTPATIENT)
Dept: RHEUMATOLOGY | Facility: CLINIC | Age: 63
End: 2018-04-25

## 2018-04-25 DIAGNOSIS — M05.9 SEROPOSITIVE RHEUMATOID ARTHRITIS (H): ICD-10-CM

## 2018-04-25 LAB — COPATH REPORT: NORMAL

## 2018-04-25 NOTE — TELEPHONE ENCOUNTER
----- Message from Marialuisa Sanches CMA sent at 4/25/2018  1:33 PM CDT -----  The patient is calling inquiring about possibly starting Enbrel in a tablet form based on the results from her recent liver studies. She is requesting a call back to discuss further. Thanks!    Marialuisa Sanches CMA

## 2018-04-26 NOTE — TELEPHONE ENCOUNTER
Pharmacy is calling to follow up this request. States that patient received a defective item and needs a new Rx. Please advise.

## 2018-04-27 NOTE — TELEPHONE ENCOUNTER
Returned patient call. She states she attempted to use Enbrel Auto Injectors, and states it didn't work so attempted to use others. She states all four Auto injectors were not working. She states called Enb[employer] and was told new shipment would be made. She hadn't received anything yet, so called and spoke to them again today. She is wondering what to do next. Advised would call and speak to [facility] and get back to patient with info.

## 2018-04-27 NOTE — TELEPHONE ENCOUNTER
Called and spoke to [facility]. They had not received a report from [employer] re the defective Enbrel Autoinjectors. Pharmacy tech requested patient call them directly to give them all of the information to assist her with replacemant of items. Patient was then called back and no answer. Left this information in detailed message with instructions per Pharmacy  along with phone # [phone], and they will assist patient with replacement process.

## 2018-05-01 NOTE — TELEPHONE ENCOUNTER
Sent a script to [facility] this time only to authorize the 2 free syringes to replace the previous reported defective syringes. Attempted to call [facility] by phone to return their call, and was unbale to speak to rep after holding for 20 mins. Script then sent.

## 2018-05-01 NOTE — TELEPHONE ENCOUNTER
Reason for call:  Medication   If this is a refill request, has the caller requested the refill from the pharmacy already? Yes  Will the patient be using a [facility]? No  Name of the pharmacy and phone number for the current request: [facility],   [address] [place], [place] [address]    Name of the medication requested: IN MED LIST    Other request: PATIENT HAD MEDICATION, DEFECTIVE, CAN GET TWO SURE CLICKS FOR FREE WITH A VERBAL OKAY.     Phone number to reach patient:  Other phone number:      Best Time:  ANY    Can we leave a detailed message on this number?  Not Applicable

## 2018-05-02 ENCOUNTER — TELEPHONE (OUTPATIENT)
Dept: RHEUMATOLOGY | Facility: CLINIC | Age: 63
End: 2018-05-02

## 2018-05-02 DIAGNOSIS — M05.9 SEROPOSITIVE RHEUMATOID ARTHRITIS (H): ICD-10-CM

## 2018-05-02 DIAGNOSIS — F40.298 NEEDLE PHOBIA: Primary | ICD-10-CM

## 2018-05-02 NOTE — TELEPHONE ENCOUNTER
"Spoke with patient, reports 3 of 4 autoinjector Enbrel syringes were defective. States that she has history of needle phobia, trouble giving self injections because of positioning, hand dexterity issues to self administrate autoinjector. \"I already have so much anxiety, after prepping for the injection, then it doesn't work. I feel like this autoinjector is not working for me. I just want to take my medication without any problems. Maybe I should go on pills\".     She reports Enbrel is managing her arthritis symptoms. her last injection on 4/15/18, normal takes her meds on Sunday. She is waiting for her replacement Enbrel autoinjectors. Reports some mild morning stiffness and discomfort. denies any flare symptoms at this time. She declines any prednisone for symptoms.     Writer suggested Enbrel Mini first, it may help with her needle phobia, hand dexterity and time for self injection administration this could help her be more successful for medication management since the medication is working to manage her arthritis symptoms. She verbalizes understanding and agrees with plan.     Chart reviewed with Dr. Meza.     Plan:  Order Enbrel mini 50 mg SQ once a week.   Reviewed previously tried/failed medications.     Lizet Adame, RYANN, RN, PHN  Rheumatology Care Coordinator    [facility]          "

## 2018-05-11 NOTE — TELEPHONE ENCOUNTER
[employer] Safety Queries form received re: patient reported burning with injection of Enbrel. Forms completed/signed by Dr. Meza, faxed form to [employer]. Patient will start Auto Touch next week to see if this will help with needle phobia, self injections and dexterity issues for self management of medications.    Patient scheduled for nurse only visit on Monday at 10:00 AM.     RYAN StephensN, RN, PHN  Rheumatology Care Coordinator    [facility]

## 2018-05-14 ENCOUNTER — ALLIED HEALTH/NURSE VISIT (OUTPATIENT)
Dept: NURSING | Facility: CLINIC | Age: 63
End: 2018-05-14
Payer: COMMERCIAL

## 2018-05-14 VITALS
OXYGEN SATURATION: 97 % | BODY MASS INDEX: 27.6 KG/M2 | WEIGHT: 155.8 LBS | DIASTOLIC BLOOD PRESSURE: 76 MMHG | TEMPERATURE: 97.8 F | RESPIRATION RATE: 16 BRPM | SYSTOLIC BLOOD PRESSURE: 133 MMHG | HEART RATE: 92 BPM

## 2018-05-14 DIAGNOSIS — Z71.89 ENCOUNTER FOR EDUCATION ABOUT INJECTION: ICD-10-CM

## 2018-05-14 DIAGNOSIS — M05.9 SEROPOSITIVE RHEUMATOID ARTHRITIS (H): Primary | ICD-10-CM

## 2018-05-14 PROCEDURE — 99207 ZZC NO CHARGE NURSE ONLY: CPT

## 2018-05-14 NOTE — PATIENT INSTRUCTIONS
The following is a summary of your office visit:    Patient instructions:    Enbrel Auto Touch review and follow instructions. If you have any problems please contact [phone]        If you have had any blood work, imaging or other testing completed we will be in touch within 1-2 weeks regarding the results.     If you need refills please contact your pharmacy.     Urgent after hour care please call the [facility] at [phone].    It was a pleasure meeting with you today. Please let us know if there is anything else we can do for you so that we can be sure you are leaving completely satisfied with your care experience.     If you have any questions, concerns or need to schedule a follow up, please contact us at [phone] and our fax [phone].    Your Rheumatology Team at [facility]  Provider: Dr. Susana KISER Care Coordinator: Lizet JUDD: Hina  CMA: Rakel      Self-management of your rheumatoid arthritis medication: self-injection Tip Sheet    Self-injection tips  It is helpful to review the [employer] web site, you can watch the self-injection video to see how to use your syringe or autoinjector pen prior to giving your first injection. Most [employer] companies offer nurse support services.   We offer nurse visits in the clinic to help with self-injections and if you need additional support for medication management.     First step:     Review package insert, ask the pharmacist or contact [employer] to find out when you should take the medication out of the Fridge and for how long.    Take your medication out of the fridge about 30 minutes to 1 hour prior to giving your injection.    Then you want to gather your supplies: alcohol swab or cotton, band aid, cotton ball, prefilled or auto-injector syringe and sharps disposal container.    Find a clean, flat surface.    Wash your hands well with soap and water.    Look at the syringe, check the expiration  date, check if the syringe for any cracks or if broken. Check the liquid to make sure it is clear or colorless-see instruction package.  Choosing an injection site    Upper outer part of your thighs.    Abdomen, stay away from umbilical area at must be at least 2 inches away.    Remember that these are intended to be subcutaneous injections therefore you need to select a site that has adequate fat.    Avoid areas that have heavy scarring, bruising or stretch marks, raised areas or psoriasis.    You should always rotate your injection site. There needs to be 1 inch space between each site.  Once you have picked your injection site:     Clean site with alcohol swab and let it air dry (do not fan or blow on the clean area. Also, do not touch the site again before giving injection).    Remember to record the date, time and site where you injected your medication.       What happens if I experience any reactions from my medication?  Site reactions can occur immediately following an injection or up to a week later.    You can apply ice before and after injection to minimize local reactions.    You can apply a topical antihistamine to the injection site, however, DO NOT MASSAGE THE INJECTION SITE or AREA.     You can also take oral antihistamine as well. However, you should notify your rheumatology team if the reaction becomes increasingly worse.    Systemic reactions can include rash, itching, chest tightness, shortness of breath, itchy throat.   -- If you have hives-rash, you need to go to Urgent Care or Primary Care.   -- If you experience severe symptoms like chest tightness, shortness of breath, difficulty breathing, itchy throat or anaphylactic reaction you need to call 911 and go to the Emergency department. Take an oral antihistamine.    If you experience these symptoms, stop the injections and schedule follow-up appointment.     When should I hold my injection and contact the clinic?     If you have an active  infection: fever, chills, persistent cough and/or drainage, urinary symptoms indicating a possible UTI.     If you have any symptoms of infections, like a temperature of 101 or greater, chills, night sweats, shortness of breath, productive cough, tightness in chest, or other signs of illness, please seek medical attention from either your primary care provider or Urgent Care immediately.     If you have a wound that is open or redden or non-healing or drainage.    If you have mouth ulcers- especially reoccurring ulcers, contact the clinic.    If you have a scheduled surgery and procedures, contact your rheumatology team for instructions on your medication.    Also, if you have surgery, your incision site/wound needs to be closed, dry -- no drainage, and no signs of infections before you restart your medication.    You should hold injection if currently on an antibiotic treatment.    Ask questions about your immunizations (vaccines)    NO LIVE VACCINES WHILE YOU ARE ON IMMUNOSUPPRESSION THERAPY.    Influenza vaccine yearly-injection only.     Do not use the influenza NASAL MIST, it contain live virus.    If patient needs LIVE VACCINES, we advise that you contact the clinic for further advisement on your biologic medication from your rheumatologist.     Keep up to date with your immunizations: ask about Pneumococcal, influenza vaccines with your provider.     Clinical Reference:   ALISTAIR Carbone., STEFANIE Beasley., AMY Sherman, & YONAS Vicente. (2015). Core Curriculum for Rheumatology Nursing-First Edition. [place], [place]: [employer]  VIVIEN Gamboa (2015, 12 15). Tips for Educating Patients in the Age of Biologics. Retrieved 10 2, 2017, from The Rheumatologist:  http://www.the-rheumatologist.org/article/tips-for-educating-patients-in-the-age-of-biologics/2/?singlepage=1

## 2018-05-14 NOTE — MR AVS SNAPSHOT
After Visit Summary   5/14/2018    Lesly Campbell    MRN: [id number]           Patient Information     Date Of Birth          1955        Visit Information        Provider Department      5/14/2018 10:15 AM Spec, Nurse Only [facility]        Care Instructions    The following is a summary of your office visit:    Patient instructions:    Enbrel Auto Touch review and follow instructions. If you have any problems please contact [phone]        If you have had any blood work, imaging or other testing completed we will be in touch within 1-2 weeks regarding the results.     If you need refills please contact your pharmacy.     Urgent after hour care please call the [facility] at [phone].    It was a pleasure meeting with you today. Please let us know if there is anything else we can do for you so that we can be sure you are leaving completely satisfied with your care experience.     If you have any questions, concerns or need to schedule a follow up, please contact us at [phone] and our fax [phone].    Your Rheumatology Team at [facility]  Provider: Dr. Susana KISER Care Coordinator: Lizet JUDD: Hina  CMA: Rakel      Self-management of your rheumatoid arthritis medication: self-injection Tip Sheet    Self-injection tips  It is helpful to review the UrgentRx web site, you can watch the self-injection video to see how to use your syringe or autoinjector pen prior to giving your first injection. Most manufacture companies offer nurse support services.   We offer nurse visits in the clinic to help with self-injections and if you need additional support for medication management.     First step:     Review package insert, ask the pharmacist or contact [employer] to find out when you should take the medication out of the Fridge and for how long.    Take your medication out of the fridge about 30 minutes to 1 hour  prior to giving your injection.    Then you want to gather your supplies: alcohol swab or cotton, band aid, cotton ball, prefilled or auto-injector syringe and sharps disposal container.    Find a clean, flat surface.    Wash your hands well with soap and water.    Look at the syringe, check the expiration date, check if the syringe for any cracks or if broken. Check the liquid to make sure it is clear or colorless-see instruction package.  Choosing an injection site    Upper outer part of your thighs.    Abdomen, stay away from umbilical area at must be at least 2 inches away.    Remember that these are intended to be subcutaneous injections therefore you need to select a site that has adequate fat.    Avoid areas that have heavy scarring, bruising or stretch marks, raised areas or psoriasis.    You should always rotate your injection site. There needs to be 1 inch space between each site.  Once you have picked your injection site:     Clean site with alcohol swab and let it air dry (do not fan or blow on the clean area. Also, do not touch the site again before giving injection).    Remember to record the date, time and site where you injected your medication.       What happens if I experience any reactions from my medication?  Site reactions can occur immediately following an injection or up to a week later.    You can apply ice before and after injection to minimize local reactions.    You can apply a topical antihistamine to the injection site, however, DO NOT MASSAGE THE INJECTION SITE or AREA.     You can also take oral antihistamine as well. However, you should notify your rheumatology team if the reaction becomes increasingly worse.    Systemic reactions can include rash, itching, chest tightness, shortness of breath, itchy throat.   -- If you have hives-rash, you need to go to Urgent Care or Primary Care.   -- If you experience severe symptoms like chest tightness, shortness of breath, difficulty  breathing, itchy throat or anaphylactic reaction you need to call 911 and go to the Emergency department. Take an oral antihistamine.    If you experience these symptoms, stop the injections and schedule follow-up appointment.     When should I hold my injection and contact the clinic?     If you have an active infection: fever, chills, persistent cough and/or drainage, urinary symptoms indicating a possible UTI.     If you have any symptoms of infections, like a temperature of 101 or greater, chills, night sweats, shortness of breath, productive cough, tightness in chest, or other signs of illness, please seek medical attention from either your primary care provider or Urgent Care immediately.     If you have a wound that is open or redden or non-healing or drainage.    If you have mouth ulcers- especially reoccurring ulcers, contact the clinic.    If you have a scheduled surgery and procedures, contact your rheumatology team for instructions on your medication.    Also, if you have surgery, your incision site/wound needs to be closed, dry -- no drainage, and no signs of infections before you restart your medication.    You should hold injection if currently on an antibiotic treatment.    Ask questions about your immunizations (vaccines)    NO LIVE VACCINES WHILE YOU ARE ON IMMUNOSUPPRESSION THERAPY.    Influenza vaccine yearly-injection only.     Do not use the influenza NASAL MIST, it contain live virus.    If patient needs LIVE VACCINES, we advise that you contact the clinic for further advisement on your biologic medication from your rheumatologist.     Keep up to date with your immunizations: ask about Pneumococcal, influenza vaccines with your provider.     Clinical Reference:   ALISTAIR Carbone., STEFANIE Beasley., AMY Sherman, & YONAS Vicente. (2015). Core Curriculum for Rheumatology Nursing-First Edition. [place], [place]: Rheumatology Nurses Society, Inc.  VIVIEN Gamboa (2015, 12 15). Tips for Educating Patients in the  Age of Biologics. Retrieved 10 2, 2017, from The Rheumatologist:  http://www.the-rheumatologist.org/article/tips-for-educating-patients-in-the-age-of-biologics/2/?singlepage=1            Follow-ups after your visit        Follow-up notes from your care team     Return if symptoms worsen or fail to improve.      Your next 10 appointments already scheduled     Jul 12, 2018 11:00 AM CDT   [facility]    [address] [place] [id number] [phone]            Jul 12, 2018 11:40 AM CDT   Return Visit with Darryl Ibarra MD   [facility]    [address] [place] [address] [phone]              Who to contact     If you have questions or need follow up information about today's clinic visit or your schedule please contact [facility] directly at [phone].  Normal or non-critical lab and imaging results will be communicated to you by EcoNovahart, letter or phone within 4 business days after the clinic has received the results. If you do not hear from us within 7 days, please contact the clinic through [employer] or phone. If you have a critical or abnormal lab result, we will notify you by phone as soon as possible.  Submit refill requests through ScanCafe or call your pharmacy and they will forward the refill request to us. Please allow 3 business days for your refill to be completed.          Additional Information About Your Visit        ScanCafe Information     ScanCafe gives you secure access to your electronic health record. If you see a primary care provider, you can also send messages to your care team and make appointments. If you have questions, please call your primary care clinic.  If you do not have a primary care provider, please call [phone] and they will assist you.      ScanCafe is an electronic gateway that provides  easy, online access to your medical records. With PostBeyond, you can request a clinic appointment, read your test results, renew a prescription or communicate with your care team.     To access your existing account, please contact your [facility] or call [phone] for assistance.        Care EveryWhere ID     This is your Care EveryWhere ID. This could be used by other organizations to access your [facility] medical records  [id number]        Your Vitals Were     Pulse Temperature Respirations Pulse Oximetry          92 97.8  F (36.6  C) (Oral) 16 97%         Blood Pressure from Last 3 Encounters:   05/14/18 133/76   04/24/18 124/57   04/12/18 130/75    Weight from Last 3 Encounters:   04/24/18 162 lb (73.5 kg)   04/12/18 157 lb (71.2 kg)   03/20/18 157 lb (71.2 kg)              Today, you had the following     No orders found for display         Today's Medication Changes          These changes are accurate as of 5/14/18 10:36 AM.  If you have any questions, ask your nurse or doctor.               These medicines have changed or have updated prescriptions.        Dose/Directions    Etanercept 50 MG/ML Soct   This may have changed:  Another medication with the same name was removed. Continue taking this medication, and follow the directions you see here.   Used for:  Seropositive rheumatoid arthritis (H), Needle phobia        Dose:  50 mg   Inject 50 mg Subcutaneous every 7 days Use with AutoTouch. Hold for signs of infection and seek medical attention.   Quantity:  4 Cartridge   Refills:  5                Primary Care Provider Office Phone # Fax #    Janessa Farias [phone]        [facility] [address] [facility] [address]        Equal Access to Services     SHANNON GREENE : Hadrosangela Lopez, rachel choudhury, chi sheppard. So [facility] [phone].    ATENCIÓN: Taryn ken,  tiene a [place] disposición servicios gratuitos de asistencia lingüística. Ritchie leija [phone].    We comply with applicable federal civil rights laws and [place] laws. We do not discriminate on the basis of race, color, national origin, age, disability, sex, sexual orientation, or gender identity.            Thank you!     Thank you for choosing [facility] [[email or web address]]  for your care. Our goal is always to provide you with excellent care. Hearing back from our patients is one way we can continue to improve our services. Please take a few minutes to complete the written survey that you may receive in the mail after your visit with us. Thank you!             Your Updated Medication List - Protect others around you: Learn how to safely use, store and throw away your medicines at www.disposemymeds.org.          This list is accurate as of 5/14/18 10:36 AM.  Always use your most recent med list.                   Brand Name Dispense Instructions for use Diagnosis    acetaminophen 325 MG tablet    TYLENOL    100 tablet    Take 2 tablets (650 mg) by mouth every 6 hours as needed for mild pain    Chronic midline low back pain with bilateral sciatica       Etanercept 50 MG/ML Soct     4 Cartridge    Inject 50 mg Subcutaneous every 7 days Use with AutoTouch. Hold for signs of infection and seek medical attention.    Seropositive rheumatoid arthritis (H), Needle phobia       gabapentin 100 MG capsule    NEURONTIN     Take 100 mg by mouth 2 times daily as needed        ibuprofen 800 MG tablet    ADVIL/MOTRIN     Take 800 mg by mouth every 8 hours as needed for moderate pain        loratadine 10 MG tablet    CLARITIN     daily    Seropositive rheumatoid arthritis (H)       metFORMIN 500 MG 24 hr tablet    GLUCOPHAGE-XR     Take 500 mg by mouth 2 times daily (with meals)        montelukast 10 MG tablet    SINGULAIR     Take 1 tablet by mouth daily        OMEPRAZOLE PO      Take 20 mg by mouth every morning        polyvinyl  alcohol 1.4 % ophthalmic solution    ARTIFICIAL TEARS    15 mL    Place 1 drop into both eyes as needed for dry eyes All right to substitute any artificial tear that is on formulary.  Use same sig.    Dry eyes       sodium chloride 0.65 % nasal spray    OCEAN     Spray 2 sprays in nostril daily as needed for congestion

## 2018-05-14 NOTE — PROGRESS NOTES
"    [facility]     Subjective/Objective:    Lesly Campbell is a 62 year old female who presents for education/teaching on Enbrel Auto Touch for management of her rheumatoid arthritis.     Reason for visit: Dr. Meza requested self injection teaching and training.     Discussion:  Patient eager to try Enbrel Auto Touch and begin treatment for her rheumatoid arthritis again. She reports having had issues with Enbrel syringe and Auto injector device because of needle phobia and hand dexterity with arthritis. States that it was discouraging and felt overwhelmed when prepping to give herself injections, \"I was so worried about giving myself another injection and still having fear/anxiety from the needles plus site reactions from the medication. I felt that maybe it was time to find a different treatment option without injections. But now I am ready to try this Auto Touch. I just want to be able to give my injection and go on with my day\".     Patient concerns: has no specific complaints and has been feeling well. no further questions, proceeded to self injection teaching and training.     Relevant Diagnosis:  Rheumatoid Arthritis    MEDICATIONS:   Taking medication(s) as prescribed? Yes  Taking over the counter medication(s?) No  Any medication side effects? No significant side effects    Any barriers to taking medication(s) as prescribed?  No  Medication(s) improving/managing symptoms?  N/A  Medication reconciliation completed: Yes  Allergies:   Allergies   Allergen Reactions     Prednisone      bloating     Sulfa Drugs GI Disturbance     Antibiotics caused GI upset, diarrhea, vomiting, and cramps       Teaching Topic:  Self injection of Enbrel      Person(s) involved in teaching:  Patient    Motivation Level:   Asks Questions:   Yes  Eager to Learn:  Yes  Cooperative:  Yes  Receptive (willing/able to accept information):  Yes  Comments: Pt had reviewed Enbrel Auto Touch educational material " prior to visit.    Patient demonstrates understanding of the following:  Reason for the appointment, diagnosis and treatment plan:  Yes  Knowledge of proper use of medications and conditions for which they are ordered (with special attention to potential side effects or drug interactions):  Yes  Which situations necessitate calling provider and whom to contact:  Yes (signs of infection, temp > 101, recurrent bouts of illness or infection or if patient has been put on an antibiotic)    Teaching Concerns:  No.  Demonstration was given by nurse with patient doing return demonstrations using practice supplies until she is comfortable , Patient completed self injection of Enbrel Auto Touch using good technique with minimal coaching using her own supply of medication. , Patient denied any adverse effects post injection, and tolerated it well. and Discharge to home per self    Proper use and care of Medication:  Yes and Enbrel Cartridge with Auto Touch device; Yes, Sharps container disposal:     Nutritional needs and diet plan:  Yes  Pain management techniques:  Yes  Patient instructed on hand hygiene:  Yes  How and/when to access community resources:  Yes      Infection Prevention:  Patient demonstrates understanding of the following:  Signs and symptoms of infection taught:  Yes      Instructional Materials Used/Given: Enbrel handout and web site; American College of Rheumatology Patient Fact Sheet on TNF inhibitors.      NURSING PLAN: Nursing advice to patient to contact clinic if any problems or concerns with self-management of RA medications or self injections    RECOMMENDED DISPOSITION:  Home care advice - reviewed self management of medications, when to contact the clinic and continue with current treatment plan.     Scheduled follow-up with Dr. Meza on 7/12/18.    Next 5 appointments (look out 90 days)     Jul 12, 2018 11:00 AM CDT   Return Visit with Aida Meza MD   [facility]  [facility])    [address] [place] [address]   [phone]            Jul 12, 2018 11:40 AM CDT   Return Visit with Darryl Ibarra MD   [facility]    [address] [place] [address]   [phone]                  Will comply with recommendation: Yes  If further questions/concerns or if symptoms do not improve, worsen or new symptoms develop, call your PCP, [facility] or our office as soon as possible.    Guideline used:  American College of Rheumatology, Enbrel, TNF inhibitors, self injection Clinical References    Time spent teaching with patient:  Spent 45 minutes with patient teaching proper technique and observation of patient performing self injection.Answered all of the patient s questions to his satisfaction.    Lesly Campbell comes into clinic today at the request of Dr. Meza Ordering Provider for Medication Management:  self injection training and Pt Teaching Enbrel safety, instructions for use, infection prevention and when to contact the clinic.      This service provided today was under the supervising provider of the day Dr. Meza, who was available if needed.          Lizet Adame, BSN, RN, PHN  Rheumatology Care Coordinator    [facility]

## 2018-07-12 ENCOUNTER — OFFICE VISIT (OUTPATIENT)
Dept: GASTROENTEROLOGY | Facility: CLINIC | Age: 63
End: 2018-07-12
Payer: COMMERCIAL

## 2018-07-12 ENCOUNTER — OFFICE VISIT (OUTPATIENT)
Dept: RHEUMATOLOGY | Facility: CLINIC | Age: 63
End: 2018-07-12
Payer: COMMERCIAL

## 2018-07-12 VITALS
TEMPERATURE: 96.9 F | HEART RATE: 96 BPM | BODY MASS INDEX: 28.85 KG/M2 | WEIGHT: 152.8 LBS | DIASTOLIC BLOOD PRESSURE: 74 MMHG | HEIGHT: 61 IN | OXYGEN SATURATION: 96 % | SYSTOLIC BLOOD PRESSURE: 121 MMHG

## 2018-07-12 VITALS
HEART RATE: 88 BPM | BODY MASS INDEX: 27 KG/M2 | HEIGHT: 63 IN | SYSTOLIC BLOOD PRESSURE: 113 MMHG | WEIGHT: 152.4 LBS | DIASTOLIC BLOOD PRESSURE: 68 MMHG | OXYGEN SATURATION: 95 %

## 2018-07-12 DIAGNOSIS — K75.81 NASH (NONALCOHOLIC STEATOHEPATITIS): Primary | ICD-10-CM

## 2018-07-12 DIAGNOSIS — R74.8 ELEVATED LIVER ENZYMES: ICD-10-CM

## 2018-07-12 DIAGNOSIS — M05.9 SEROPOSITIVE RHEUMATOID ARTHRITIS (H): Primary | ICD-10-CM

## 2018-07-12 LAB
ALBUMIN SERPL-MCNC: 4.4 G/DL (ref 3.4–5)
ALT SERPL W P-5'-P-CCNC: 112 U/L (ref 0–50)
AST SERPL W P-5'-P-CCNC: 74 U/L (ref 0–45)
CREAT SERPL-MCNC: 0.59 MG/DL (ref 0.52–1.04)
CRP SERPL-MCNC: <2.9 MG/L (ref 0–8)
ERYTHROCYTE [DISTWIDTH] IN BLOOD BY AUTOMATED COUNT: 13.7 % (ref 10–15)
ERYTHROCYTE [SEDIMENTATION RATE] IN BLOOD BY WESTERGREN METHOD: 13 MM/H (ref 0–30)
FERRITIN SERPL-MCNC: 53 NG/ML (ref 8–252)
GFR SERPL CREATININE-BSD FRML MDRD: >90 ML/MIN/1.7M2
HCT VFR BLD AUTO: 40 % (ref 35–47)
HGB BLD-MCNC: 13 G/DL (ref 11.7–15.7)
IRON SATN MFR SERPL: 18 % (ref 15–46)
IRON SERPL-MCNC: 69 UG/DL (ref 35–180)
MCH RBC QN AUTO: 30 PG (ref 26.5–33)
MCHC RBC AUTO-ENTMCNC: 32.5 G/DL (ref 31.5–36.5)
MCV RBC AUTO: 92 FL (ref 78–100)
PLATELET # BLD AUTO: 279 10E9/L (ref 150–450)
RBC # BLD AUTO: 4.34 10E12/L (ref 3.8–5.2)
TIBC SERPL-MCNC: 374 UG/DL (ref 240–430)
WBC # BLD AUTO: 8.2 10E9/L (ref 4–11)

## 2018-07-12 PROCEDURE — 82728 ASSAY OF FERRITIN: CPT | Performed by: INTERNAL MEDICINE

## 2018-07-12 PROCEDURE — 85027 COMPLETE CBC AUTOMATED: CPT | Performed by: STUDENT IN AN ORGANIZED HEALTH CARE EDUCATION/TRAINING PROGRAM

## 2018-07-12 PROCEDURE — 84450 TRANSFERASE (AST) (SGOT): CPT | Performed by: STUDENT IN AN ORGANIZED HEALTH CARE EDUCATION/TRAINING PROGRAM

## 2018-07-12 PROCEDURE — 36415 COLL VENOUS BLD VENIPUNCTURE: CPT | Performed by: INTERNAL MEDICINE

## 2018-07-12 PROCEDURE — 83540 ASSAY OF IRON: CPT | Performed by: INTERNAL MEDICINE

## 2018-07-12 PROCEDURE — 99214 OFFICE O/P EST MOD 30 MIN: CPT | Performed by: STUDENT IN AN ORGANIZED HEALTH CARE EDUCATION/TRAINING PROGRAM

## 2018-07-12 PROCEDURE — 84460 ALANINE AMINO (ALT) (SGPT): CPT | Performed by: STUDENT IN AN ORGANIZED HEALTH CARE EDUCATION/TRAINING PROGRAM

## 2018-07-12 PROCEDURE — 99214 OFFICE O/P EST MOD 30 MIN: CPT | Performed by: INTERNAL MEDICINE

## 2018-07-12 PROCEDURE — 82565 ASSAY OF CREATININE: CPT | Performed by: STUDENT IN AN ORGANIZED HEALTH CARE EDUCATION/TRAINING PROGRAM

## 2018-07-12 PROCEDURE — 86140 C-REACTIVE PROTEIN: CPT | Performed by: STUDENT IN AN ORGANIZED HEALTH CARE EDUCATION/TRAINING PROGRAM

## 2018-07-12 PROCEDURE — 82784 ASSAY IGA/IGD/IGG/IGM EACH: CPT | Performed by: INTERNAL MEDICINE

## 2018-07-12 PROCEDURE — 82103 ALPHA-1-ANTITRYPSIN TOTAL: CPT | Performed by: INTERNAL MEDICINE

## 2018-07-12 PROCEDURE — 83550 IRON BINDING TEST: CPT | Performed by: INTERNAL MEDICINE

## 2018-07-12 PROCEDURE — 83516 IMMUNOASSAY NONANTIBODY: CPT | Performed by: INTERNAL MEDICINE

## 2018-07-12 PROCEDURE — 85652 RBC SED RATE AUTOMATED: CPT | Performed by: STUDENT IN AN ORGANIZED HEALTH CARE EDUCATION/TRAINING PROGRAM

## 2018-07-12 PROCEDURE — 82040 ASSAY OF SERUM ALBUMIN: CPT | Performed by: STUDENT IN AN ORGANIZED HEALTH CARE EDUCATION/TRAINING PROGRAM

## 2018-07-12 ASSESSMENT — PAIN SCALES - GENERAL
PAINLEVEL: MILD PAIN (2)
PAINLEVEL: NO PAIN (0)

## 2018-07-12 NOTE — PROGRESS NOTES
Rheumatology Clinic Visit     Lesly Campbell MRN# [id number]   YOB: 1955 Age: 61 year old     Date of Visit: July 12, 2018  Primary care provider: Janessa Farias          Assessment and Plan:   ASSESSMENT:     -- Seropositive rheumatoid arthritis, positive RF, positive CCP.   -- Elevated LFT - Steatohepatitis - liver Bx - 4/24/18  -- Fatty liver on abdominal ultrasound  -- Fatigue and morning stiffness - better      -- Numbness and tingling in the feet  -- Headaches   -- Diabetes and neuropathy.    Ms. Campbell presented today for management of RA. She is on Enbrel 50 mg subcutaneous injection autoinjector once a week for the last 2 months, Enbrel was started in 8/2017 but changed into auto injector 2 months ago. Her joint pains and morning stiffness have improved.     She was given sulfasalazine before but could not tolerate it due to GI side effects.    She has elevated LFTs since June of 2017. Has seen Dr Ibarra and had liver biopsy which showed fatty liver.       Plan     -- Labs -  CBC, ESR, CRP, creatinine done today 7/12/18 shows normal CBC, Creatinine, elevated AST, ALT.     -- Continue Enbrel 50 mg SQ once a week.     -- RTC in 6 months.               Active Problem List:     Patient Active Problem List    Diagnosis Date Noted     Tinnitus, bilateral 03/21/2018     Priority: Medium     HCD (health care directive) 03/20/2018     Priority: Medium     Information given to review  Anastacia Ratliff MA         Elevated LFTs 09/25/2017     Priority: Medium     Seropositive rheumatoid arthritis (H) 07/14/2017     Priority: Medium     Neuropathy 07/14/2017     Priority: Medium     Pre-diabetes 06/22/2017     Priority: Medium     See telephone note and scanned documents.               History of Present Illness:   Lesly Campbell is a 61 year old female past medical history of rheumatoid arthritis, newly diagnosed diabetes, neuropathy seen in the clinic at consultation request of primary care Janessa  Dinora for evaluation and treatment of rheumatoid arthritis. She is coming today for follow up.     July 12, 2018 - She has headaches now going on for 2 months. She take tyelnol and Ibuprofen for headaches. Using Enbrel autoinjector since 5/2018. Her joint pains are better. Denies morning stiffness. Had liver biopsy in 4/2018 which has shown fatty liver.     Interim History : November 15, 2017 - She is on Enbrel 50 mg subcutaneous weekly and has noticed improvement in joint pain and stiffness. Numbness and tingling in her hands improved. She was experiencing site reaction with Enbrel, she tried to injecting it slowly over 20-30 seconds which decreased the pain and burning sensation. She still does not want to be on injectables and was wondering about other oral medication options. She still has abnormal ALT and will be seeing Dr. Ibarra in GI clinic next month.     Interim History : 9/11/17 -  She is on Enbrel 50 mg SQ for 2 months. She denies any side effects with Enbrel use, no local injection site reaction. She has noticed improvement in joint pains and morning stiffness. She is not on prednisone. She occasionally uses ibuprofen. She does complain of fatigue. Denies any abdominal pain, change in appetite, weight loss or diarrhea. She was given sulfasalazine before Enbrel but could not tolerate it due to GI side effects.       Interim History - 7/5/17 - She is taking 5 mg prednisone. She has noticed significant improvement in joint pains especially in her hands, wrists and feet. Morning stiffness is better and reduced to less than 1 hour. She is not taking Tylenol at present. Takes ibuprofen 800 mg as needed basis, less frequent than before after start of prednisone. She still has numbness and tingling in her fingertips and heel and toes. She was recently diagnosed with diabetes and started metformin and gabapentin 100 mg.    Denies any new fever, chills, weight loss. Denies any raynauds, malar rash,  photosensitivity, recurrent mouth/genital ulcers, sicca symptoms, pleuritic chest pains, recurrent sinusitis/rhinitis, swallowing difficulty, hearing or visual changes recently.     HPI from Initial consult  Ms. Campbell was diagnosed with rheumatoid arthritis in 2001.  She was followed by Dr. Jimenez in [facility] and later by Dr. Meade at [facility].  She has taken methotrexate 7 tablets in the past.  She did mention about taking self injectables as well as infusions but could not recall the name of her medications.  Due to loss of insurance she could not take these medications and could not follow up with her rheumatologist.  Since 2010, she has not taken her methotrexate.  She follows up with a primary care at [facility] and her pain is managed with Tylenol 1000 mg and ibuprofen 800 mg on as needed basis.  According to her, she was doing fine since 2010, had no flareups but starting in 05/2017 she started complaining more pain in her hands and feet along with morning stiffness.  She also feels more fatigued and tired.  Her primary care did some labs including CBC, liver, kidney function and rheumatoid factor.  Her rheumatoid factor came back high at 54.  SILVINA was negative.  She had mild increase in ALT to 41.  Otherwise, had normal CRP and CBC.  Along with some mild stiffness and pain in her hands she also complains of a significant burning pain in her fingertips.  She was prescribed metformin and gabapentin by her primary care, but has not started taking these medications yet.  She was given a prednisone taper starting with 60 mg and tapering by 10 mg every 2 weeks to off.  She just finished her prednisone taper a few days ago.  Prednisone did help with her joint pains but it did not help with the numbness and tingling.  Plus, she did not like the side effects of prednisone.  She felt anxious and had sleep disturbances on it.              Review of Systems:   Review Of Systems  Constitutional:  denies fever, chills, night sweats and weight loss.  Skin: No skin rash. Burning Sensation at the Site of Enbrel Injection.   Eyes: No dryness or irritation in eyes. No episode of eye inflammation or redness.   Ears/Nose/Throat: no recurrent sinus infections.  Respiratory: No shortness of breath, dyspnea on exertion, cough, or hemoptysis  Cardiovascular: no chest pain or palpitations.  Gastrointestinal: no nausea, vomiting, abdominal pain.  Normal bowel movements.  Genitourinary: no dysuria, frequency  or hematuria.  Musculoskeletal: as in HPI  Neurologic: + numbness, tingling.  Psychiatric: no mood disorders.  Hematologic/Lymphatic/Immunologic: no history of easy bruising, petechia or purpura.  No abnormal bleeding.   Endocrine: no h/o thyroid disease, + Diabetes.                  Past Medical History:[facility]     Past Medical History:   Diagnosis Date     Elevated LFTs      Epigastric abdominal pain      Myalgia      Myositis      Neuropathy      Pharyngitis      Prediabetes      Rheumatoid arthritis (H)      Past Surgical History:   Procedure Laterality Date     NO HISTORY OF SURGERY              Social History:     Social History     Occupational History     Not on file.     Social History Main Topics     Smoking status: Former Smoker     Quit date: 2008     Smokeless tobacco: Never Used     Alcohol use Yes      Comment: beer once/week     Drug use: No     Sexual activity: Not on file            Family History:     Family History   Problem Relation Age of Onset     Cirrhosis Mother      Liver Cancer Mother      Glaucoma Maternal Grandmother      Glaucoma Brother      Macular Degeneration No family hx of             Allergies:     Allergies   Allergen Reactions     Prednisone      bloating     Sulfa Drugs GI Disturbance     Antibiotics caused GI upset, diarrhea, vomiting, and cramps            Medications:     Current Outpatient Prescriptions   Medication Sig Dispense Refill     acetaminophen (TYLENOL) 325 MG tablet  "Take 2 tablets (650 mg) by mouth every 6 hours as needed for mild pain 100 tablet 3     Atorvastatin Calcium (LIPITOR PO)        Cyanocobalamin (B-12 PO)        Etanercept 50 MG/ML SOCT Inject 50 mg Subcutaneous every 7 days Use with AutoTouch. Hold for signs of infection and seek medical attention. 4 Cartridge 5     gabapentin (NEURONTIN) 100 MG capsule Take 100 mg by mouth 2 times daily as needed       ibuprofen (ADVIL/MOTRIN) 800 MG tablet Take 800 mg by mouth every 8 hours as needed for moderate pain       loratadine (CLARITIN) 10 MG tablet daily        metFORMIN (GLUCOPHAGE-XR) 500 MG 24 hr tablet Take 500 mg by mouth 2 times daily (with meals)        montelukast (SINGULAIR) 10 MG tablet Take 1 tablet by mouth daily  5     OMEPRAZOLE PO Take 20 mg by mouth every morning       polyvinyl alcohol (ARTIFICIAL TEARS) 1.4 % ophthalmic solution Place 1 drop into both eyes as needed for dry eyes All right to substitute any artificial tear that is on formulary.  Use same sig. 15 mL 3     sodium chloride (OCEAN) 0.65 % nasal spray Spray 2 sprays in nostril daily as needed for congestion              Physical Exam:   Blood pressure 113/68, pulse 88, height 1.6 m (5' 3\"), weight 69.1 kg (152 lb 6.4 oz), SpO2 95 %, not currently breastfeeding.  Wt Readings from Last 4 Encounters:   07/12/18 69.1 kg (152 lb 6.4 oz)   05/14/18 70.7 kg (155 lb 12.8 oz)   04/24/18 73.5 kg (162 lb)   04/12/18 71.2 kg (157 lb)       Constitutional: well-developed, appearing stated age; cooperative  Eyes: nl EOM, PERRLA, vision, conjunctiva, sclera  ENT: nl external ears, nose, hearing, lips, teeth, gums, throat  No mucous membrane lesions, normal saliva pool  Neck: no mass or thyroid enlargement  Resp: lungs clear to auscultation, nl to palpation  CV: RRR, no murmurs, rubs or gallops, no edema  GI: no ABD mass or tenderness, no HSM  : not tested  Lymph: no cervical, supraclavicular, inguinal or epitrochlear nodes    MS: All TMJ, neck, shoulder, " elbow, wrist, MCP/PIP/DIP, spine, hip, knee, ankle, and foot MTP/IP joints were examined.  --  No active synovitis or deformity present over MCP, PIP, wrists, elbows, shoulders bilaterally. Full ROM.  Normal  strength.   -- No tenderness on MTP squeeze. No synovitis tenderness over MTP and ankles. No knee effusions.  -- No dactylitis,  tenosynovitis, enthesopathy.    Skin: no nail pitting, alopecia, rash, nodules or lesions  Neuro: nl cranial nerves, strength, sensation, DTRs.   Psych: nl judgement, orientation, memory, affect.         Data:     Results for orders placed or performed during the hospital encounter of 04/24/18   INR   Result Value Ref Range    INR 0.87 0.86 - 1.14       Recent Labs   Lab Test  06/14/17   1215   WBC  7.1   RBC  4.37   HGB  13.2   HCT  40.3   MCV  92   RDW  14.7   PLT  283   ALBUMIN  3.6   CRP  9.7*      Recent Labs   Lab Test  06/14/17   1215   TSH  0.98     Hemoglobin   Date Value Ref Range Status   04/12/2018 13.2 11.7 - 15.7 g/dL Final   09/11/2017 13.7 11.7 - 15.7 g/dL Final   07/28/2017 13.4 11.7 - 15.7 g/dL Final     Sed Rate   Date Value Ref Range Status   04/12/2018 10 0 - 30 mm/h Final   09/11/2017 9 0 - 30 mm/h Final   07/05/2017 12 0 - 30 mm/h Final     CRP Inflammation   Date Value Ref Range Status   04/12/2018 <2.9 0.0 - 8.0 mg/L Final   09/11/2017 <2.9 0.0 - 8.0 mg/L Final   07/05/2017 <2.9 0.0 - 8.0 mg/L Final     AST   Date Value Ref Range Status   04/12/2018 71 (H) 0 - 45 U/L Final   09/11/2017 43 0 - 45 U/L Final   08/09/2017 32 0 - 45 U/L Final     Albumin   Date Value Ref Range Status   09/11/2017 3.8 3.4 - 5.0 g/dL Final   08/09/2017 3.7 3.4 - 5.0 g/dL Final   07/28/2017 3.8 3.4 - 5.0 g/dL Final     Alkaline Phosphatase   Date Value Ref Range Status   09/11/2017 63 40 - 150 U/L Final   08/09/2017 57 40 - 150 U/L Final   07/28/2017 64 40 - 150 U/L Final     ALT   Date Value Ref Range Status   04/12/2018 88 (H) 0 - 50 U/L Final   09/11/2017 88 (H) 0 - 50 U/L  Final   08/09/2017 65 (H) 0 - 50 U/L Final     Recent Labs   Lab Test  04/12/18   0914  09/11/17   1035  08/09/17   0927  07/28/17   1158   06/14/17   1215   WBC  8.1  6.9   --   7.6   --   7.1   HGB  13.2  13.7   --   13.4   --   13.2   HCT  41.6  42.0   --   41.9   --   40.3   MCV  92  92   --   93   --   92   PLT  286  312   --   318   --   283   TSH   --    --    --    --    --   0.98   AST  71*  43  32  46*   < >  45   ALT  88*  88*  65*  71*   < >  63*   ALKPHOS   --   63  57  64   < >  69    < > = values in this interval not displayed.     Other studies:   X-ray hands  FINDINGS: AP, oblique, and lateral views of the bilateral hands and  wrists were obtained. Bones are well aligned. Joint spaces are  well-maintained. No erosive changes. No displaced fractures. No soft  tissue abnormalities.         IMPRESSION: No erosive changes in the bilateral hands or wrists.    Outside studies reviewed:   Primary care at [facility] office labs done on 06/06/2017 showed creatinine of 0.7, BUN 15, AST 21, ALT raised to 44.  Lyme screen done, not back yet.  Rheumatoid factor high 54.  CRP was normal 0.13, SILVINA negative.  Hepatitis C is negative.  CBC in 05/2017 which showed normal hemoglobin.  No leukopenia.  Past labs in 2010, she had a CCP antibody which was high at 34.  rheumatoid factor was 16.     Reviewed Rheumatology lab flowsheet    Aida Meza MD  [facility]   Pager - [phone]

## 2018-07-12 NOTE — NURSING NOTE
"Lesly Campbell's goals for this visit include:   She requests these members of her care team be copied on today's visit information:     PCP: Janessa Farias    Referring Provider:  No referring provider defined for this encounter.    /68  Pulse 88  Ht 1.6 m (5' 3\")  Wt 69.1 kg (152 lb 6.4 oz)  SpO2 95%  BMI 27 kg/m2   "

## 2018-07-12 NOTE — MR AVS SNAPSHOT
After Visit Summary   7/12/2018    Lesly Campbell    MRN: [id number]           Patient Information     Date Of Birth          1955        Visit Information        Provider Department      7/12/2018 11:40 AM Darryl Ibarra MD [facility]        Today's Diagnoses     NAPOLES (nonalcoholic steatohepatitis)    -  1       Follow-ups after your visit        Your next 10 appointments already scheduled     Oct 08, 2018 11:00 AM CDT   US ABDOMEN/PELVIS DUPLEX COMPLETE with MGUS1, MG US TECH   [facility] ([facility])    [address] [place] [id number] [phone]           Please bring a list of your medicines (including vitamins, minerals and over-the-counter drugs). Also, tell your doctor about any allergies you may have. Wear comfortable clothes and leave your valuables at home.  Adults: No eating, smoking, gum chewing or drinking for 8 hours before the exam. You may take medicine with a small sip of water.  Children: - Infants, breast-fed: may have breast milk up to 2 hours before exam. - Infants, formula: may have bottle until 4 hours before exam. - Children 1-5 years: No food or drink for 4 hours before exam. - Children 6 -12 years: No food or drink for 6 hours before exam. - Children over 12 years: No food or drink for 8 hours before exam. - J Tube Fed: No need to stop feedings.  Please call the Imaging Department at your exam site with any questions.            Don 10, 2019 11:00 AM CST   LAB with LAB FIRST FLOOR [facility] ([facility])    [address] [place] [id number] [phone]           Please do not eat 10-12 hours before your appointment if you are coming in fasting for labs on lipids, cholesterol, or glucose (sugar). This does not apply to pregnant women. Water, hot tea and black coffee (with nothing added) are okay. Do not drink other fluids, diet soda or  chew gum.            Don 10, 2019 11:30 AM CST   [facility]    [id number] [address] [place] [id number] [phone]              Future tests that were ordered for you today     Open Standing Orders        Priority Remaining Interval Expires Ordered    US Abdomen Complete w Doppler Complete Routine 2/2 6 months 7/12/2019 7/12/2018    CBC with platelets Routine 99/99 6 MONTHS 7/12/2019 7/12/2018    AST Routine 99/99 6 MONTHS 7/12/2019 7/12/2018    ALT Routine 99/99 6 MONTHS 7/12/2019 7/12/2018    Albumin level Routine 99/99 6 MONTHS 7/12/2019 7/12/2018    Creatinine Routine 99/99 6 MONTHS 7/12/2019 7/12/2018    CRP inflammation Routine 99/99 6 MONTHS 7/12/2019 7/12/2018    Erythrocyte sedimentation rate auto Routine 99/99 6 MONTHS 7/12/2019 7/12/2018          Open Future Orders        Priority Expected Expires Ordered    US Abdomen Complete w Doppler Complete Routine  7/12/2019 7/12/2018            Who to contact     If you have questions or need follow up information about today's clinic visit or your schedule please contact [facility] directly at [phone].  Normal or non-critical lab and imaging results will be communicated to you by [email or web address], letter or phone within 4 business days after the clinic has received the results. If you do not hear from us within 7 days, please contact the clinic through [employer] or phone. If you have a critical or abnormal lab result, we will notify you by phone as soon as possible.  Submit refill requests through [employer] or call your pharmacy and they will forward the refill request to us. Please allow 3 business days for your refill to be completed.          Additional Information About Your Visit        [email or web address] Information     [employer] gives you secure access to your electronic health record. If you see a primary care provider, you can also send messages to your care  "team and make appointments. If you have questions, please call your primary care clinic.  If you do not have a primary care provider, please call [phone] and they will assist you.      [employer] is an electronic gateway that provides easy, online access to your medical records. With [employer], you can request a clinic appointment, read your test results, renew a prescription or communicate with your care team.     To access your existing account, please contact your [facility] or call [phone] for assistance.        Care EveryWhere ID     This is your Care EveryWhere ID. This could be used by other organizations to access your [facility] medical records  [id number]        Your Vitals Were     Pulse Temperature Height Pulse Oximetry BMI (Body Mass Index)       96 96.9  F (36.1  C) 1.549 m (5' 1\") 96% 28.87 kg/m2        Blood Pressure from Last 3 Encounters:   07/12/18 121/74   07/12/18 113/68   05/14/18 133/76    Weight from Last 3 Encounters:   07/12/18 69.3 kg (152 lb 12.8 oz)   07/12/18 69.1 kg (152 lb 6.4 oz)   05/14/18 70.7 kg (155 lb 12.8 oz)               Primary Care Provider Office Phone # Fax #    Janessa Farias [phone]        COMMUNITY [address] [facility] [address]        Equal Access to Services     [facility] Center[facility] : Hadii aad ku hadasho Soomaali, waaxda luqadaha, qaybta kaalmada adeegyada, chi stephenson haytalia najera . So [facility] [phone].    ATENCIÓN: Si habla español, tiene a york disposición servicios gratuitos de asistencia lingüística. Llame al [phone].    We comply with applicable federal civil rights laws and [place] laws. We do not discriminate on the basis of race, color, national origin, age, disability, sex, sexual orientation, or gender identity.            Thank you!     Thank you for choosing [facility]  for your care. Our goal is always to provide you with excellent care. " Hearing back from our patients is one way we can continue to improve our services. Please take a few minutes to complete the written survey that you may receive in the mail after your visit with us. Thank you!             Your Updated Medication List - Protect others around you: Learn how to safely use, store and throw away your medicines at www.disposemymeds.org.          This list is accurate as of 7/12/18 12:56 PM.  Always use your most recent med list.                   Brand Name Dispense Instructions for use Diagnosis    acetaminophen 325 MG tablet    TYLENOL    100 tablet    Take 2 tablets (650 mg) by mouth every 6 hours as needed for mild pain    Chronic midline low back pain with bilateral sciatica       B-12 PO       Seropositive rheumatoid arthritis (H)       Etanercept 50 MG/ML Soct     4 Cartridge    Inject 50 mg Subcutaneous every 7 days Use with AutoTouch. Hold for signs of infection and seek medical attention.    Seropositive rheumatoid arthritis (H), Needle phobia       gabapentin 100 MG capsule    NEURONTIN     Take 100 mg by mouth 2 times daily as needed        ibuprofen 800 MG tablet    ADVIL/MOTRIN     Take 800 mg by mouth every 8 hours as needed for moderate pain        LIPITOR PO       Seropositive rheumatoid arthritis (H)       loratadine 10 MG tablet    CLARITIN     daily    Seropositive rheumatoid arthritis (H)       metFORMIN 500 MG 24 hr tablet    GLUCOPHAGE-XR     Take 500 mg by mouth 2 times daily (with meals)        montelukast 10 MG tablet    SINGULAIR     Take 1 tablet by mouth daily        OMEPRAZOLE PO      Take 20 mg by mouth every morning        polyvinyl alcohol 1.4 % ophthalmic solution    ARTIFICIAL TEARS    15 mL    Place 1 drop into both eyes as needed for dry eyes All right to substitute any artificial tear that is on formulary.  Use same sig.    Dry eyes       sodium chloride 0.65 % nasal spray    OCEAN     Spray 2 sprays in nostril daily as needed for congestion

## 2018-07-12 NOTE — MR AVS SNAPSHOT
After Visit Summary   7/12/2018    Lesly Campbell    MRN: [id number]           Patient Information     Date Of Birth          1955        Visit Information        Provider Department      7/12/2018 11:00 AM Aida Meza MD [facility]        Today's Diagnoses     Seropositive rheumatoid arthritis (H)    -  1      Care Instructions    -- Will continue Enbrel 50 SQ q weekly     -- RTC in 6 months           Follow-ups after your visit        Your next 10 appointments already scheduled     Don 10, 2019 11:00 AM CST   LAB with LAB FIRST FLOOR [facility] ([facility])    [address] [address] [place] [id number]   [phone]           Please do not eat 10-12 hours before your appointment if you are coming in fasting for labs on lipids, cholesterol, or glucose (sugar). This does not apply to pregnant women. Water, hot tea and black coffee (with nothing added) are okay. Do not drink other fluids, diet soda or chew gum.            Don 10, 2019 11:30 AM CST   Return Visit with Aida Meza MD   [facility] ([facility])    [address] [place] [id number]   [phone]              Future tests that were ordered for you today     Open Standing Orders        Priority Remaining Interval Expires Ordered    CBC with platelets Routine 99/99 6 MONTHS 7/12/2019 7/12/2018    AST Routine 99/99 6 MONTHS 7/12/2019 7/12/2018    ALT Routine 99/99 6 MONTHS 7/12/2019 7/12/2018    Albumin level Routine 99/99 6 MONTHS 7/12/2019 7/12/2018    Creatinine Routine 99/99 6 MONTHS 7/12/2019 7/12/2018    CRP inflammation Routine 99/99 6 MONTHS 7/12/2019 7/12/2018    Erythrocyte sedimentation rate auto Routine 99/99 6 MONTHS 7/12/2019 7/12/2018            Who to contact     If you have questions or need follow up information about today's clinic visit or your schedule please contact [facility]  "CLINICS directly at [phone].  Normal or non-critical lab and imaging results will be communicated to you by eblizzhart, letter or phone within 4 business days after the clinic has received the results. If you do not hear from us within 7 days, please contact the clinic through eblizzhart or phone. If you have a critical or abnormal lab result, we will notify you by phone as soon as possible.  Submit refill requests through Wirama or call your pharmacy and they will forward the refill request to us. Please allow 3 business days for your refill to be completed.          Additional Information About Your Visit        eblizzharZiipa Information     Wirama gives you secure access to your electronic health record. If you see a primary care provider, you can also send messages to your care team and make appointments. If you have questions, please call your primary care clinic.  If you do not have a primary care provider, please call [phone] and they will assist you.      Wirama is an electronic gateway that provides easy, online access to your medical records. With Wirama, you can request a clinic appointment, read your test results, renew a prescription or communicate with your care team.     To access your existing account, please contact your [facility] or call [phone] for assistance.        Care EveryWhere ID     This is your Care EveryWhere ID. This could be used by other organizations to access your [facility] medical records  [id number]        Your Vitals Were     Pulse Height Pulse Oximetry BMI (Body Mass Index)          88 1.6 m (5' 3\") 95% 27 kg/m2         Blood Pressure from Last 3 Encounters:   07/12/18 113/68   05/14/18 133/76   04/24/18 124/57    Weight from Last 3 Encounters:   07/12/18 69.1 kg (152 lb 6.4 oz)   05/14/18 70.7 kg (155 lb 12.8 oz)   04/24/18 73.5 kg (162 lb)               Primary Care Provider Office Phone # Fax #    Janessa Farias [phone] " [phone]        [place] [address] [address] [facility] [address]        Equal Access to Services     SHANNON GREENE : Hadii lauro rivera hadbridgero Sojorge, waaxda luqadaha, qaybta kaalmada beverlycatarino, chi olimpiain hayaainez paulbenjie sailajacharisse reinaldo falcon. So [facility] [phone].    ATENCIÓN: Si habla español, tiene a york disposición servicios gratuitos de asistencia lingüística. Llame al [phone].    We comply with applicable federal civil rights laws and [place] laws. We do not discriminate on the basis of race, color, national origin, age, disability, sex, sexual orientation, or gender identity.            Thank you!     Thank you for choosing [facility]  for your care. Our goal is always to provide you with excellent care. Hearing back from our patients is one way we can continue to improve our services. Please take a few minutes to complete the written survey that you may receive in the mail after your visit with us. Thank you!             Your Updated Medication List - Protect others around you: Learn how to safely use, store and throw away your medicines at www.disposemymeds.org.          This list is accurate as of 7/12/18 11:45 AM.  Always use your most recent med list.                   Brand Name Dispense Instructions for use Diagnosis    acetaminophen 325 MG tablet    TYLENOL    100 tablet    Take 2 tablets (650 mg) by mouth every 6 hours as needed for mild pain    Chronic midline low back pain with bilateral sciatica       B-12 PO       Seropositive rheumatoid arthritis (H)       Etanercept 50 MG/ML Soct     4 Cartridge    Inject 50 mg Subcutaneous every 7 days Use with AutoTouch. Hold for signs of infection and seek medical attention.    Seropositive rheumatoid arthritis (H), Needle phobia       gabapentin 100 MG capsule    NEURONTIN     Take 100 mg by mouth 2 times daily as needed        ibuprofen 800 MG tablet    ADVIL/MOTRIN     Take 800 mg by mouth every 8 hours as needed for  moderate pain        LIPITOR PO       Seropositive rheumatoid arthritis (H)       loratadine 10 MG tablet    CLARITIN     daily    Seropositive rheumatoid arthritis (H)       metFORMIN 500 MG 24 hr tablet    GLUCOPHAGE-XR     Take 500 mg by mouth 2 times daily (with meals)        montelukast 10 MG tablet    SINGULAIR     Take 1 tablet by mouth daily        OMEPRAZOLE PO      Take 20 mg by mouth every morning        polyvinyl alcohol 1.4 % ophthalmic solution    ARTIFICIAL TEARS    15 mL    Place 1 drop into both eyes as needed for dry eyes All right to substitute any artificial tear that is on formulary.  Use same sig.    Dry eyes       sodium chloride 0.65 % nasal spray    OCEAN     Spray 2 sprays in nostril daily as needed for congestion

## 2018-07-12 NOTE — NURSING NOTE
"Lesly Campbell's goals for this visit include: Follow up  She requests these members of her care team be copied on today's visit information: Yes    PCP: Janessa Farias    Referring Provider:  No referring provider defined for this encounter.    /74 (BP Location: Left arm, Patient Position: Sitting, Cuff Size: Adult Regular)  Pulse 96  Temp 96.9  F (36.1  C)  Ht 1.549 m (5' 1\")  Wt 69.3 kg (152 lb 12.8 oz)  SpO2 96%  BMI 28.87 kg/m2    Do you need any medication refills at today's visit? No    Temo KIM    "

## 2018-07-13 LAB
IGA SERPL-MCNC: 339 MG/DL (ref 70–380)
IGG SERPL-MCNC: 1230 MG/DL (ref 695–1620)
IGM SERPL-MCNC: 49 MG/DL (ref 60–265)
MITOCHONDRIA M2 IGG SER-ACNC: 1 U/ML
TTG IGA SER-ACNC: 1 U/ML
TTG IGG SER-ACNC: <1 U/ML

## 2018-07-16 LAB — A1AT SERPL-MCNC: 146 MG/DL (ref 80–200)

## 2018-07-16 NOTE — PROGRESS NOTES
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      Overall she is doing well, clinically has no signs of advanced liver disease or portal hypertension.  Spent the entire visit reviewing causes of fat in the liver, potential for complications including inflammation (NAPOLES), fibrosis, cirrhosis, HCC, and possible need for liver transplantation.     Encouraged her to focus on controlling their metabolic risk factors including glucose control, hyperlipidemia, and the importance of regular exercise and dietary modifications to result in ~ 7-10% weight loss. This offers the best chance to improve fibrosis.   We worked on identifying goals and exploring strategies to help implement change.     -We plan on repeating labs and ultrasound in approximately 6 months with clinic follow-up.  -Consider fibrosis can next year depending on her progress.  If clear signs of stage III-IV fibrosis then would initiate HCC screening and esophageal varices surveillance.  - otherwise, no change to her management     RTC in 6 months or sooner as needed    Darryl Ibarra MD  [facility] Hepatology clinic    -----------------------------------------------------       HPI:   Lesly Campbell is a 62 year old female with biopsy-proven Napoles, stage II-3 fibrosis who presents for follow-up.    Please refer to prior clinic note for details of initial presentation.   Since last clinic visit she is done relatively well.  Underwent liver biopsy which showed steatohepatitis with pericellular fibrosis and focal bridging.  Has been able to achieve approximately 5 pounds weight loss by changes in dietary habits.    Otherwise, denies chest pain, shortness of breath,  abdominal pain, nausea, vomiting fevers, chills, bleeding, jaundice, confusion, falls, rash, pruritis, leg swelling or abdominal distention. Has stable weight, appetite and bowel habits.         Past Medical History:     Past Medical History:   Diagnosis Date     Elevated LFTs      Epigastric abdominal pain      Myalgia      Myositis      Neuropathy      Pharyngitis      Prediabetes      Rheumatoid arthritis (H)             Past Surgical History:     Past Surgical History:   Procedure Laterality Date     NO HISTORY OF SURGERY              Medications:     Current Outpatient Prescriptions   Medication     acetaminophen (TYLENOL) 325 MG tablet     Atorvastatin Calcium (LIPITOR PO)     Cyanocobalamin (B-12 PO)     Etanercept 50 MG/ML SOCT     gabapentin (NEURONTIN) 100 MG capsule     ibuprofen (ADVIL/MOTRIN) 800 MG tablet     loratadine (CLARITIN) 10 MG tablet     metFORMIN (GLUCOPHAGE-XR) 500 MG 24 hr tablet     montelukast (SINGULAIR) 10 MG tablet     OMEPRAZOLE PO     polyvinyl alcohol (ARTIFICIAL TEARS) 1.4 % ophthalmic solution     sodium chloride (OCEAN) 0.65 % nasal spray     No current facility-administered medications for this visit.             Allergies:     Allergies   Allergen Reactions     Prednisone      bloating     Sulfa Drugs GI Disturbance     Antibiotics caused GI upset, diarrhea, vomiting, and cramps            Social History:     Social History     Social History     Marital status: Single     Spouse name: N/A     Number of children: N/A     Years of education: N/A     Occupational History     Not on file.     Social History Main Topics     Smoking status: Former Smoker     Quit date: 2008     Smokeless tobacco: Never Used     Alcohol use Yes      Comment: beer once/week     Drug use: No     Sexual activity: Not on file     Other Topics Concern     Not on file     Social History Narrative            Family History:     Family History   Problem Relation Age of Onset     Cirrhosis Mother      Liver  "Cancer Mother      Glaucoma Maternal Grandmother      Glaucoma Brother      Macular Degeneration No family hx of               Review of Systems:   10 points ROS was obtained and highlighted in the HPI, otherwise negative.          Physical Exam:   VS:  /74 (BP Location: Left arm, Patient Position: Sitting, Cuff Size: Adult Regular)  Pulse 96  Temp 96.9  F (36.1  C)  Ht 1.549 m (5' 1\")  Wt 69.3 kg (152 lb 12.8 oz)  SpO2 96%  BMI 28.87 kg/m2      Gen: A&Ox3, NAD  HEENT: non-icteric, oropharynx clear  CV: RRR  Lung: CTAB  Abd: soft, NT, ND    Ext: no edema, intact pulses.   Skin: No stigmata of chronic liver disease.   Neuro: no focal deficits, grossly intact, no asterixis   Psych: appropriate mood and affects         Data:   Reviewed in person and significant for:  Lab Results   Component Value Date    WBC 8.2 07/12/2018     Lab Results   Component Value Date    RBC 4.34 07/12/2018     Lab Results   Component Value Date    HGB 13.0 07/12/2018     Lab Results   Component Value Date    HCT 40.0 07/12/2018     No components found for: MCT  Lab Results   Component Value Date    MCV 92 07/12/2018     Lab Results   Component Value Date    MCH 30.0 07/12/2018     Lab Results   Component Value Date    MCHC 32.5 07/12/2018     Lab Results   Component Value Date    RDW 13.7 07/12/2018     Lab Results   Component Value Date     07/12/2018     Last Basic Metabolic Panel:  No results found for: NA   Lab Results   Component Value Date    POTASSIUM 4.4 06/06/2017     No results found for: CHLORIDE  No results found for: JERARDO  No results found for: CO2  No results found for: BUN  Lab Results   Component Value Date    CR 0.59 07/12/2018     Lab Results   Component Value Date    GLC 90 06/06/2017     Liver Function Studies -   Recent Labs   Lab Test  07/12/18   1300   09/11/17   1035   PROTTOTAL   --    --   7.4   ALBUMIN  4.4   --   3.8   BILITOTAL   --    --   0.3   ALKPHOS   --    --   63   AST  74*   < >  43 "   ALT  112*   < >  88*    < > = values in this interval not displayed.

## 2018-10-04 DIAGNOSIS — H04.123 DRY EYES: ICD-10-CM

## 2018-10-04 NOTE — TELEPHONE ENCOUNTER
Routing refill request to provider for review/approval because:  Drug not on the [facility] refill protocol   Jodie Dudley BSN, RN

## 2018-10-05 ENCOUNTER — TELEPHONE (OUTPATIENT)
Dept: RHEUMATOLOGY | Facility: CLINIC | Age: 63
End: 2018-10-05

## 2018-10-05 RX ORDER — POLYVINYL ALCOHOL 14 MG/ML
1 SOLUTION/ DROPS OPHTHALMIC PRN
Qty: 15 ML | Refills: 6 | Status: SHIPPED | OUTPATIENT
Start: 2018-10-05 | End: 2018-10-15

## 2018-10-05 NOTE — TELEPHONE ENCOUNTER
[facility] Call Center    Phone Message    May a detailed message be left on voicemail: yes    Reason for Call: Other: Patient would like to know if she needs wait a few days after her enbral to get the flu shot or can she do it the same day? Please advise.      Action Taken: Message routed to:  Adult Clinics: Rheumatology p [phone]

## 2018-10-08 NOTE — TELEPHONE ENCOUNTER
Per the pharmacist, Fredi Goldman, the pt should be fine to get her flu shot the same day as her Enbrel. The pt was informed and she had no further questions.    Shraddha Hills RN

## 2018-10-15 NOTE — TELEPHONE ENCOUNTER
Fax from [facility] phone [phone],  Product unavailable polyvinyl alcohol artificial tears   Ordered Systane Ultra twice a day as needed   Aleena Guaman, OD

## 2018-10-16 DIAGNOSIS — F40.298 NEEDLE PHOBIA: ICD-10-CM

## 2018-10-16 DIAGNOSIS — M05.9 SEROPOSITIVE RHEUMATOID ARTHRITIS (H): ICD-10-CM

## 2018-10-16 NOTE — TELEPHONE ENCOUNTER
Medication:     Etanercept 50 MG/ML SOCT 4 Cartridge 5 5/2/2018  --      Sig - Route: Inject 50 mg Subcutaneous every 7 days Use with AutoTouch. Hold for signs of infection and seek medical attention. - Subcutaneous       Sig: Inject 50 mg subcutaneous every 7 days.  Date last written: 05/02/2018  Dispensed amount: 4 cartridges  Refills: 5  Date last dispensed: 05/02/2018      Pt's last office visit: 07/12/2018  Next scheduled office visit: 01/10/2019      Per the RN/LPN medication refill protocol, writer is  to refill this request. If able to refill, please call the pt to inform them the RX was sent to the pharmacy. If unable to refill, route this encounter to the prescribing physician for authorization or further instructions.

## 2018-10-30 ENCOUNTER — OFFICE VISIT (OUTPATIENT)
Dept: URGENT CARE | Facility: URGENT CARE | Age: 63
End: 2018-10-30
Payer: COMMERCIAL

## 2018-10-30 VITALS
BODY MASS INDEX: 29.1 KG/M2 | SYSTOLIC BLOOD PRESSURE: 111 MMHG | HEART RATE: 96 BPM | WEIGHT: 154 LBS | DIASTOLIC BLOOD PRESSURE: 67 MMHG | OXYGEN SATURATION: 97 % | TEMPERATURE: 98.6 F

## 2018-10-30 DIAGNOSIS — R35.0 URINARY FREQUENCY: ICD-10-CM

## 2018-10-30 DIAGNOSIS — J11.1 INFLUENZA-LIKE ILLNESS: Primary | ICD-10-CM

## 2018-10-30 DIAGNOSIS — J02.9 SORE THROAT: ICD-10-CM

## 2018-10-30 LAB
ALBUMIN UR-MCNC: NEGATIVE MG/DL
ANISOCYTOSIS BLD QL SMEAR: SLIGHT
APPEARANCE UR: CLEAR
BACTERIA #/AREA URNS HPF: ABNORMAL /HPF
BASOPHILS # BLD AUTO: 0.1 10E9/L (ref 0–0.2)
BASOPHILS NFR BLD AUTO: 1 %
BILIRUB UR QL STRIP: NEGATIVE
COLOR UR AUTO: YELLOW
DEPRECATED S PYO AG THROAT QL EIA: NORMAL
DIFFERENTIAL METHOD BLD: NORMAL
EOSINOPHIL # BLD AUTO: 0.4 10E9/L (ref 0–0.7)
EOSINOPHIL NFR BLD AUTO: 4 %
ERYTHROCYTE [DISTWIDTH] IN BLOOD BY AUTOMATED COUNT: 14.1 % (ref 10–15)
FLUAV+FLUBV AG SPEC QL: NEGATIVE
FLUAV+FLUBV AG SPEC QL: NEGATIVE
GLUCOSE UR STRIP-MCNC: NEGATIVE MG/DL
HCT VFR BLD AUTO: 36.5 % (ref 35–47)
HETEROPH AB SER QL: NEGATIVE
HGB BLD-MCNC: 11.7 G/DL (ref 11.7–15.7)
HGB UR QL STRIP: NEGATIVE
KETONES UR STRIP-MCNC: ABNORMAL MG/DL
LEUKOCYTE ESTERASE UR QL STRIP: ABNORMAL
LYMPHOCYTES # BLD AUTO: 3.4 10E9/L (ref 0.8–5.3)
LYMPHOCYTES NFR BLD AUTO: 37 %
MCH RBC QN AUTO: 29.7 PG (ref 26.5–33)
MCHC RBC AUTO-ENTMCNC: 32.1 G/DL (ref 31.5–36.5)
MCV RBC AUTO: 93 FL (ref 78–100)
MONOCYTES # BLD AUTO: 1.2 10E9/L (ref 0–1.3)
MONOCYTES NFR BLD AUTO: 13 %
MUCOUS THREADS #/AREA URNS LPF: PRESENT /LPF
NEUTROPHILS # BLD AUTO: 4.1 10E9/L (ref 1.6–8.3)
NEUTROPHILS NFR BLD AUTO: 45 %
NITRATE UR QL: NEGATIVE
NON-SQ EPI CELLS #/AREA URNS LPF: ABNORMAL /LPF
PH UR STRIP: 7.5 PH (ref 5–7)
PLATELET # BLD AUTO: 293 10E9/L (ref 150–450)
PLATELET # BLD EST: NORMAL 10*3/UL
RBC # BLD AUTO: 3.94 10E12/L (ref 3.8–5.2)
RBC #/AREA URNS AUTO: ABNORMAL /HPF
SOURCE: ABNORMAL
SP GR UR STRIP: 1.01 (ref 1–1.03)
SPECIMEN SOURCE: NORMAL
SPECIMEN SOURCE: NORMAL
UROBILINOGEN UR STRIP-ACNC: 1 EU/DL (ref 0.2–1)
VARIANT LYMPHS BLD QL SMEAR: PRESENT
WBC # BLD AUTO: 9.2 10E9/L (ref 4–11)
WBC #/AREA URNS AUTO: ABNORMAL /HPF

## 2018-10-30 PROCEDURE — 87081 CULTURE SCREEN ONLY: CPT | Mod: 59 | Performed by: PHYSICIAN ASSISTANT

## 2018-10-30 PROCEDURE — 85025 COMPLETE CBC W/AUTO DIFF WBC: CPT | Performed by: PHYSICIAN ASSISTANT

## 2018-10-30 PROCEDURE — 87086 URINE CULTURE/COLONY COUNT: CPT | Performed by: PHYSICIAN ASSISTANT

## 2018-10-30 PROCEDURE — 81001 URINALYSIS AUTO W/SCOPE: CPT | Performed by: PHYSICIAN ASSISTANT

## 2018-10-30 PROCEDURE — 36415 COLL VENOUS BLD VENIPUNCTURE: CPT | Performed by: PHYSICIAN ASSISTANT

## 2018-10-30 PROCEDURE — 99214 OFFICE O/P EST MOD 30 MIN: CPT | Performed by: PHYSICIAN ASSISTANT

## 2018-10-30 PROCEDURE — 87880 STREP A ASSAY W/OPTIC: CPT | Performed by: PHYSICIAN ASSISTANT

## 2018-10-30 PROCEDURE — 87804 INFLUENZA ASSAY W/OPTIC: CPT | Performed by: PHYSICIAN ASSISTANT

## 2018-10-30 PROCEDURE — 86308 HETEROPHILE ANTIBODY SCREEN: CPT | Performed by: PHYSICIAN ASSISTANT

## 2018-10-30 RX ORDER — CIPROFLOXACIN 500 MG/1
500 TABLET, FILM COATED ORAL 2 TIMES DAILY
Qty: 20 TABLET | Refills: 0 | Status: SHIPPED | OUTPATIENT
Start: 2018-10-30 | End: 2019-03-04

## 2018-10-30 RX ORDER — OSELTAMIVIR PHOSPHATE 75 MG/1
75 CAPSULE ORAL 2 TIMES DAILY
Qty: 10 CAPSULE | Refills: 0 | Status: SHIPPED | OUTPATIENT
Start: 2018-10-30 | End: 2019-03-04

## 2018-10-30 ASSESSMENT — ENCOUNTER SYMPTOMS
FLANK PAIN: 0
NAUSEA: 1
WHEEZING: 0
FEVER: 1
SHORTNESS OF BREATH: 0
PALPITATIONS: 0
DIAPHORESIS: 1
SPUTUM PRODUCTION: 0
HEMOPTYSIS: 0
MYALGIAS: 1
FREQUENCY: 1
WEIGHT LOSS: 0
COUGH: 1
SORE THROAT: 1
EYE PAIN: 0
HEMATURIA: 0
CARDIOVASCULAR NEGATIVE: 1
DYSURIA: 0

## 2018-10-30 NOTE — PROGRESS NOTES
SUBJECTIVE:     HPI  Lesly Campbell is a 63 year old female who presents to clinic today for the following health issues:  RESPIRATORY SYMPTOMS    Duration: 4-5days    Description  sore throat, cough, fever, chills, headache, fatigue/malaise, myalgias and nausea    Severity: moderate    Accompanying signs and symptoms: Nonproductive cough but no shortness of breath or wheezing.  No sinus congestion/pain/pressure.  No abdominal pain, vomiting, constipation, diarrhea, bloody or black tarry stools.  Urinary frequency but no dysuria, urgency or hematuria. LBP but no radicular pain, numbness, tingling or weakness.  No bladder or bowel dysfunction.  No swelling, redness, drainage or fevers.      History (predisposing factors):  Hx of RA, neuropathy    Precipitating or alleviating factors: None    Therapies tried and outcome:  rest and fluids acetaminophen OTC NSAID with temporary relief     Reviewed PMH, FMH and SOH.  Patient Active Problem List   Diagnosis     Pre-diabetes     Seropositive rheumatoid arthritis (H)     Neuropathy     Elevated LFTs     HCD (health care directive)     Tinnitus, bilateral     Current Outpatient Prescriptions   Medication Sig Dispense Refill     acetaminophen (TYLENOL) 325 MG tablet Take 2 tablets (650 mg) by mouth every 6 hours as needed for mild pain 100 tablet 3     Atorvastatin Calcium (LIPITOR PO)        Cyanocobalamin (B-12 PO)        Etanercept 50 MG/ML SOCT Inject 50 mg Subcutaneous every 7 days Use with AutoTouch. Hold for signs of infection and seek medical attention. 4 Cartridge 5     gabapentin (NEURONTIN) 100 MG capsule Take 100 mg by mouth 2 times daily as needed       ibuprofen (ADVIL/MOTRIN) 800 MG tablet Take 800 mg by mouth every 8 hours as needed for moderate pain       loratadine (CLARITIN) 10 MG tablet daily        metFORMIN (GLUCOPHAGE-XR) 500 MG 24 hr tablet Take 500 mg by mouth 2 times daily (with meals)        montelukast (SINGULAIR) 10 MG tablet Take 1 tablet by  mouth daily  5     OMEPRAZOLE PO Take 20 mg by mouth every morning       polyethylene glycol 0.4%- propylene glycol 0.3% (SYSTANE ULTRA) 0.4-0.3 % SOLN ophthalmic solution Place 1 drop into both eyes 2 times daily as needed for dry eyes 1 Bottle 6     sodium chloride (OCEAN) 0.65 % nasal spray Spray 2 sprays in nostril daily as needed for congestion       Allergies   Allergen Reactions     Prednisone      bloating     Sulfa Drugs GI Disturbance     Antibiotics caused GI upset, diarrhea, vomiting, and cramps       Review of Systems   Constitutional: Positive for diaphoresis, fever and malaise/fatigue. Negative for weight loss.   HENT: Positive for sore throat.    Eyes: Negative for pain.   Respiratory: Positive for cough. Negative for hemoptysis, sputum production, shortness of breath and wheezing.    Cardiovascular: Negative.  Negative for chest pain and palpitations.   Gastrointestinal: Positive for nausea.   Genitourinary: Positive for frequency. Negative for dysuria, flank pain, hematuria and urgency.   Musculoskeletal: Positive for myalgias.   Skin: Negative.    All other systems reviewed and are negative.      /67  Pulse 96  Temp 98.6  F (37  C) (Oral)  Wt 154 lb (69.9 kg)  SpO2 97%  BMI 29.1 kg/m2  Physical Exam   Constitutional: She is oriented to person, place, and time and well-developed, well-nourished, and in no distress. No distress.   HENT:   Head: Normocephalic and atraumatic.   Nose: Nose normal.   Mouth/Throat: Oropharynx is clear and moist. No oropharyngeal exudate.   TMs are intact without any erythema or bulging bilaterally.  Airway is patent.   Eyes: Conjunctivae and EOM are normal. Pupils are equal, round, and reactive to light. No scleral icterus.   Neck: Normal range of motion. Neck supple. No thyromegaly present.   Cardiovascular: Normal rate, regular rhythm, normal heart sounds and intact distal pulses.  Exam reveals no gallop and no friction rub.    No murmur  heard.  Pulmonary/Chest: Effort normal and breath sounds normal. No accessory muscle usage. No respiratory distress. She has no decreased breath sounds. She has no wheezes. She has no rhonchi. She has no rales.   Abdominal: Soft. Normal appearance, normal aorta and bowel sounds are normal. She exhibits no mass. There is no hepatosplenomegaly. There is no tenderness. There is no rebound, no guarding, no CVA tenderness, no tenderness at McBurney's point and negative Ford's sign. No hernia.   Lymphadenopathy:     She has no cervical adenopathy.   Neurological: She is alert and oriented to person, place, and time.   Skin: Skin is warm and dry. No cyanosis. Nails show no clubbing.   Psychiatric: Mood and affect normal.   Nursing note and vitals reviewed.        Assessment/Plan:  Influenza-like illness: Rapid influenza and CBC with diff was negative or normal.  Will empirically treat with [id number] based on H&P.  Recommend tylenol/ibuprofen prn pain/fever.  He in considered contagious until he has been fever free for at least 24hours without the use of antipyretics.  Cover coughs, sneezes and wash hands.  Rest, fluids, chicken soup.  Recheck in clinic if symptoms worsen or if symptoms do not improve.  To the ER  she develops hemoptysis, shortness of breath/wheezing, chest pain, stiff neck or fevers >102.  -     Influenza A/B antigen  -     CBC with platelets differential  -     oseltamivir (TAMIFLU) 75 MG capsule; Take 1 capsule (75 mg) by mouth 2 times daily for 5 days    Sore throat:  RST and mono were negative, will send for throat culture.  Recommend tylenol/ibuprofen prn pain/fever, warm salt water gargles, lozenges or cough drops.    -     Rapid strep screen  -     Mononucleosis screen  -     Beta strep group A culture    Urinary frequency:  UA and micro is suspicious for UTI and will empirically treat with cipro [id number].  Will send for UC to help guide abx treatment.  Discussed risks and benefits of  medication along with side effects, direction for use, and tendon rupture.  Recommend increase fluids, regular voids, proper wiping techniques and voiding after intercourse.  Educated patient on warning signs of kidney infection and to go to the ER if she develops any of these symptoms.    -     UA with Microscopic reflex to Culture  -     Urine Culture Aerobic Bacterial  -     ciprofloxacin (CIPRO) 500 MG tablet; Take 1 tablet (500 mg) by mouth 2 times daily for 10 days          Sakina Bustillo PA-C

## 2018-10-30 NOTE — MR AVS SNAPSHOT
After Visit Summary   10/30/2018    Lesly Campbell    MRN: [id number]           Patient Information     Date Of Birth          1955        Visit Information        Provider Department      10/30/2018 5:10 PM Sakina Bustillo PA-C [facility]        Today's Diagnoses     Influenza-like illness    -  1    Sore throat        Urinary frequency           Follow-ups after your visit        Your next 10 appointments already scheduled     Nov 06, 2018 11:00 AM CST   US ABDOMEN/PELVIS DUPLEX COMPLETE with MGUS1, MG US TECH   [facility]    [id number] [place] [id number] [phone]           How do I prepare for my exam? (Food and drink instructions) Adults: No eating, smoking, gum chewing or drinking for 8 hours before the exam. You may take medicine with a small sip of water.  Children: * Infants, breast-fed: may have breast milk up to 2 hours before exam. * Infants, formula: may have bottle until 4 hours before exam. * Children 1-5 years: No food or drink for 4 hours before exam. * Children 6 -12 years: No food or drink for 6 hours before exam. * Children over 12 years: No food or drink for 8 hours before exam.  * J Tube Fed: No need to stop feedings.  What should I wear: Wear comfortable clothes.  How long does the exam take: Most ultrasounds take 30 to 60 minutes.  What should I bring: Bring a list of your medicines, including vitamins, minerals and over-the-counter drugs. It is safest to leave personal items at home.  Do I need a :  No  is needed.  What do I need to tell my doctor: Tell your doctor about any allergies you may have.  What should I do after the exam: No restrictions, You may resume normal activities.  What is this test: An ultrasound uses sound waves to make pictures of the body. Sound waves do not cause pain. The only discomfort may be the pressure of the wand against your skin or full  bladder.  Who should I call with questions: If you have any questions, please call the Imaging Department where you will have your exam. Directions, parking instructions, and other information is available on our website, [email or web address].            Don 10, 2019 11:00 AM CST   LAB with LAB FIRST FLOOR [facility] ([facility])    [address] [place] [id number] [phone]           Please do not eat 10-12 hours before your appointment if you are coming in fasting for labs on lipids, cholesterol, or glucose (sugar). This does not apply to pregnant women. Water, hot tea and black coffee (with nothing added) are okay. Do not drink other fluids, diet soda or chew gum.            Don 10, 2019 11:30 AM CST   Return Visit with Aida Meza MD   [facility] ([facility])    [address] [place] [id number] [phone]              Who to contact     If you have questions or need follow up information about today's clinic visit or your schedule please contact [facility] directly at [phone].  Normal or non-critical lab and imaging results will be communicated to you by MyChart, letter or phone within 4 business days after the clinic has received the results. If you do not hear from us within 7 days, please contact the clinic through MyChart or phone. If you have a critical or abnormal lab result, we will notify you by phone as soon as possible.  Submit refill requests through Voice[employer] or call your pharmacy and they will forward the refill request to us. Please allow 3 business days for your refill to be completed.          Additional Information About Your Visit        [employer] Information     [employer] gives you secure access to your electronic health record. If you see a primary care provider, you can also send messages to your care team and make appointments. If you have questions, please  call your primary care clinic.  If you do not have a primary care provider, please call [phone] and they will assist you.        Care EveryWhere ID     This is your Care EveryWhere ID. This could be used by other organizations to access your [facility] medical records  [id number]        Your Vitals Were     Pulse Temperature Pulse Oximetry BMI (Body Mass Index)          96 98.6  F (37  C) (Oral) 97% 29.1 kg/m2         Blood Pressure from Last 3 Encounters:   10/30/18 111/67   07/12/18 121/74   07/12/18 113/68    Weight from Last 3 Encounters:   10/30/18 154 lb (69.9 kg)   07/12/18 152 lb 12.8 oz (69.3 kg)   07/12/18 152 lb 6.4 oz (69.1 kg)              We Performed the Following     Beta strep group A culture     CBC with platelets differential     Influenza A/B antigen     Mononucleosis screen     Rapid strep screen     UA with Microscopic reflex to Culture     Urine Culture Aerobic Bacterial          Today's Medication Changes          These changes are accurate as of 10/30/18  6:31 PM.  If you have any questions, ask your nurse or doctor.               Start taking these medicines.        Dose/Directions    ciprofloxacin 500 MG tablet   Commonly known as:  CIPRO   Used for:  Urinary frequency        Dose:  500 mg   Take 1 tablet (500 mg) by mouth 2 times daily for 10 days   Quantity:  20 tablet   Refills:  0       oseltamivir 75 MG capsule   Commonly known as:  TAMIFLU   Used for:  Influenza-like illness        Dose:  75 mg   Take 1 capsule (75 mg) by mouth 2 times daily for 5 days   Quantity:  10 capsule   Refills:  0            Where to get your medicines      These medications were sent to [facility] [address], [address]  [address] [facility] [address] [address]     Phone:  [phone]     ciprofloxacin 500 MG tablet    oseltamivir 75 MG capsule                Primary Care Provider Office Phone # Fax #    Janessa Farias [phone]       NATIVE  [place] [address] [facility] [address]        Equal Access to Services     SHANNON GREENE : Hadii aad ku hadbridgerhéctor Cherijorge, wafcoda oumoufeltonha, qakarenyaima mcconnellsusanmiquel crawford, chi menardrooseveltroxana falcon. So [facility] [phone].    ATENCIÓN: Si habla español, tiene a york disposición servicios gratuitos de asistencia lingüística. Llame al [phone].    We comply with applicable federal civil rights laws and [place] laws. We do not discriminate on the basis of race, color, national origin, age, disability, sex, sexual orientation, or gender identity.            Thank you!     Thank you for choosing [facility]  for your care. Our goal is always to provide you with excellent care. Hearing back from our patients is one way we can continue to improve our services. Please take a few minutes to complete the written survey that you may receive in the mail after your visit with us. Thank you!             Your Updated Medication List - Protect others around you: Learn how to safely use, store and throw away your medicines at www.disposemymeds.org.          This list is accurate as of 10/30/18  6:31 PM.  Always use your most recent med list.                   Brand Name Dispense Instructions for use Diagnosis    acetaminophen 325 MG tablet    TYLENOL    100 tablet    Take 2 tablets (650 mg) by mouth every 6 hours as needed for mild pain    Chronic midline low back pain with bilateral sciatica       B-12 PO       Seropositive rheumatoid arthritis (H)       ciprofloxacin 500 MG tablet    CIPRO    20 tablet    Take 1 tablet (500 mg) by mouth 2 times daily for 10 days    Urinary frequency       Etanercept 50 MG/ML Soct     4 Cartridge    Inject 50 mg Subcutaneous every 7 days Use with AutoTouch. Hold for signs of infection and seek medical attention.    Seropositive rheumatoid arthritis (H), Needle phobia       gabapentin 100 MG capsule    NEURONTIN     Take 100 mg by mouth 2 times daily as  needed        ibuprofen 800 MG tablet    ADVIL/MOTRIN     Take 800 mg by mouth every 8 hours as needed for moderate pain        LIPITOR PO       Seropositive rheumatoid arthritis (H)       loratadine 10 MG tablet    CLARITIN     daily    Seropositive rheumatoid arthritis (H)       metFORMIN 500 MG 24 hr tablet    GLUCOPHAGE-XR     Take 500 mg by mouth 2 times daily (with meals)        montelukast 10 MG tablet    SINGULAIR     Take 1 tablet by mouth daily        OMEPRAZOLE PO      Take 20 mg by mouth every morning        oseltamivir 75 MG capsule    TAMIFLU    10 capsule    Take 1 capsule (75 mg) by mouth 2 times daily for 5 days    Influenza-like illness       polyethylene glycol 0.4%- propylene glycol 0.3% 0.4-0.3 % Soln ophthalmic solution    SYSTANE ULTRA    1 Bottle    Place 1 drop into both eyes 2 times daily as needed for dry eyes    Dry eyes       sodium chloride 0.65 % nasal spray    OCEAN     Spray 2 sprays in nostril daily as needed for congestion

## 2018-10-31 LAB
BACTERIA SPEC CULT: NORMAL
BACTERIA SPEC CULT: NORMAL
SPECIMEN SOURCE: NORMAL
SPECIMEN SOURCE: NORMAL

## 2018-11-06 ENCOUNTER — RADIANT APPOINTMENT (OUTPATIENT)
Dept: ULTRASOUND IMAGING | Facility: CLINIC | Age: 63
End: 2018-11-06
Attending: INTERNAL MEDICINE
Payer: COMMERCIAL

## 2018-11-06 DIAGNOSIS — M05.9 SEROPOSITIVE RHEUMATOID ARTHRITIS (H): ICD-10-CM

## 2018-11-06 DIAGNOSIS — K75.81 NASH (NONALCOHOLIC STEATOHEPATITIS): ICD-10-CM

## 2018-11-06 LAB
ALBUMIN SERPL-MCNC: 3.8 G/DL (ref 3.4–5)
ALT SERPL W P-5'-P-CCNC: 70 U/L (ref 0–50)
AST SERPL W P-5'-P-CCNC: 68 U/L (ref 0–45)
CREAT SERPL-MCNC: 0.6 MG/DL (ref 0.52–1.04)
CRP SERPL-MCNC: <2.9 MG/L (ref 0–8)
ERYTHROCYTE [DISTWIDTH] IN BLOOD BY AUTOMATED COUNT: 13.8 % (ref 10–15)
ERYTHROCYTE [SEDIMENTATION RATE] IN BLOOD BY WESTERGREN METHOD: 31 MM/H (ref 0–30)
GFR SERPL CREATININE-BSD FRML MDRD: >90 ML/MIN/1.7M2
HCT VFR BLD AUTO: 37.9 % (ref 35–47)
HGB BLD-MCNC: 12.1 G/DL (ref 11.7–15.7)
MCH RBC QN AUTO: 28.9 PG (ref 26.5–33)
MCHC RBC AUTO-ENTMCNC: 31.9 G/DL (ref 31.5–36.5)
MCV RBC AUTO: 91 FL (ref 78–100)
PLATELET # BLD AUTO: 379 10E9/L (ref 150–450)
RBC # BLD AUTO: 4.18 10E12/L (ref 3.8–5.2)
WBC # BLD AUTO: 8.1 10E9/L (ref 4–11)

## 2018-11-06 PROCEDURE — 76700 US EXAM ABDOM COMPLETE: CPT | Mod: 59 | Performed by: RADIOLOGY

## 2018-11-06 PROCEDURE — 82565 ASSAY OF CREATININE: CPT | Performed by: STUDENT IN AN ORGANIZED HEALTH CARE EDUCATION/TRAINING PROGRAM

## 2018-11-06 PROCEDURE — 84460 ALANINE AMINO (ALT) (SGPT): CPT | Performed by: STUDENT IN AN ORGANIZED HEALTH CARE EDUCATION/TRAINING PROGRAM

## 2018-11-06 PROCEDURE — 84450 TRANSFERASE (AST) (SGOT): CPT | Performed by: STUDENT IN AN ORGANIZED HEALTH CARE EDUCATION/TRAINING PROGRAM

## 2018-11-06 PROCEDURE — 86140 C-REACTIVE PROTEIN: CPT | Performed by: STUDENT IN AN ORGANIZED HEALTH CARE EDUCATION/TRAINING PROGRAM

## 2018-11-06 PROCEDURE — 82040 ASSAY OF SERUM ALBUMIN: CPT | Performed by: STUDENT IN AN ORGANIZED HEALTH CARE EDUCATION/TRAINING PROGRAM

## 2018-11-06 PROCEDURE — 85652 RBC SED RATE AUTOMATED: CPT | Performed by: STUDENT IN AN ORGANIZED HEALTH CARE EDUCATION/TRAINING PROGRAM

## 2018-11-06 PROCEDURE — 85027 COMPLETE CBC AUTOMATED: CPT | Performed by: STUDENT IN AN ORGANIZED HEALTH CARE EDUCATION/TRAINING PROGRAM

## 2018-11-06 PROCEDURE — 36415 COLL VENOUS BLD VENIPUNCTURE: CPT | Performed by: STUDENT IN AN ORGANIZED HEALTH CARE EDUCATION/TRAINING PROGRAM

## 2018-11-06 PROCEDURE — 93975 VASCULAR STUDY: CPT | Performed by: RADIOLOGY

## 2018-11-06 NOTE — LETTER
November 6, 2018      Lesly Campbell  [address] [facility] [address]        Dear ,    We are writing to inform you of your test results.    Liver tests are better than before, Sed rate is high this time.     Resulted Orders   CBC with platelets   Result Value Ref Range    WBC 8.1 4.0 - 11.0 10e9/L    RBC Count 4.18 3.8 - 5.2 10e12/L    Hemoglobin 12.1 11.7 - 15.7 g/dL    Hematocrit 37.9 35.0 - 47.0 %    MCV 91 78 - 100 fl    MCH 28.9 26.5 - 33.0 pg    MCHC 31.9 31.5 - 36.5 g/dL    RDW 13.8 10.0 - 15.0 %    Platelet Count 379 150 - 450 10e9/L   AST   Result Value Ref Range    AST 68 (H) 0 - 45 U/L   ALT   Result Value Ref Range    ALT 70 (H) 0 - 50 U/L   Albumin level   Result Value Ref Range    Albumin 3.8 3.4 - 5.0 g/dL   Creatinine   Result Value Ref Range    Creatinine 0.60 0.52 - 1.04 mg/dL    GFR Estimate >90 >60 mL/min/1.7m2      Comment:      Non  GFR Calc    GFR Estimate If Black >90 >60 mL/min/1.7m2      Comment:       GFR Calc   CRP inflammation   Result Value Ref Range    CRP Inflammation <2.9 0.0 - 8.0 mg/L   Erythrocyte sedimentation rate auto   Result Value Ref Range    Sed Rate 31 (H) 0 - 30 mm/h       If you have any questions or concerns, please call the clinic at the number listed above.       Sincerely,        Thanks,   Rakel Howell CMA.  [facility]   Rheumatology  Phone: [phone]  Fax: [phone]  (Staff for Dr. Meza)

## 2019-03-04 ENCOUNTER — OFFICE VISIT (OUTPATIENT)
Dept: OPTOMETRY | Facility: CLINIC | Age: 64
End: 2019-03-04
Payer: COMMERCIAL

## 2019-03-04 DIAGNOSIS — H52.03 HYPEROPIA, BILATERAL: ICD-10-CM

## 2019-03-04 DIAGNOSIS — H52.4 PRESBYOPIA: Primary | ICD-10-CM

## 2019-03-04 DIAGNOSIS — H04.123 DRY EYES: ICD-10-CM

## 2019-03-04 PROCEDURE — 92014 COMPRE OPH EXAM EST PT 1/>: CPT | Performed by: OPTOMETRIST

## 2019-03-04 PROCEDURE — 92015 DETERMINE REFRACTIVE STATE: CPT | Performed by: OPTOMETRIST

## 2019-03-04 ASSESSMENT — REFRACTION_MANIFEST
OS_CYLINDER: +0.50
OS_SPHERE: +0.50
OD_CYLINDER: SPHERE
OS_ADD: +2.25
OD_SPHERE: +1.00
OD_SPHERE: +1.25
METHOD_AUTOREFRACTION: 1
OD_AXIS: 030
OS_AXIS: 015
OS_AXIS: 023
OS_SPHERE: +0.75
OS_CYLINDER: +0.25
OD_CYLINDER: +0.50
OD_ADD: +2.25

## 2019-03-04 ASSESSMENT — VISUAL ACUITY
OD_CC+: -1
OS_CC+: -2
OD_CC: 20/25
METHOD: SNELLEN - LINEAR
OS_CC: 20/30
CORRECTION_TYPE: GLASSES
OS_CC: 20/25
OD_CC: 20/25

## 2019-03-04 ASSESSMENT — TONOMETRY
OS_IOP_MMHG: 16
OD_IOP_MMHG: 16
IOP_METHOD: APPLANATION

## 2019-03-04 ASSESSMENT — REFRACTION_WEARINGRX
SPECS_TYPE: BIFOCAL
OD_SPHERE: +1.25
OD_ADD: +2.25
OS_SPHERE: +0.50
OS_ADD: +2.25
OD_CYLINDER: SPHERE
OS_CYLINDER: SPHERE

## 2019-03-04 ASSESSMENT — EXTERNAL EXAM - RIGHT EYE: OD_EXAM: NORMAL

## 2019-03-04 ASSESSMENT — SLIT LAMP EXAM - LIDS
COMMENTS: NORMAL
COMMENTS: NORMAL

## 2019-03-04 ASSESSMENT — KERATOMETRY
OD_AXISANGLE2_DEGREES: 40
OD_K1POWER_DIOPTERS: 46.25
OS_K2POWER_DIOPTERS: 45.25
OS_K1POWER_DIOPTERS: 45.75
OS_AXISANGLE2_DEGREES: 171
OD_K2POWER_DIOPTERS: 45.50

## 2019-03-04 ASSESSMENT — CONF VISUAL FIELD
METHOD: COUNTING FINGERS
OD_NORMAL: 1
OS_NORMAL: 1

## 2019-03-04 ASSESSMENT — EXTERNAL EXAM - LEFT EYE: OS_EXAM: NORMAL

## 2019-03-04 ASSESSMENT — CUP TO DISC RATIO
OS_RATIO: 0.2
OD_RATIO: 0.2

## 2019-03-04 NOTE — PATIENT INSTRUCTIONS
Patient was advised of today's exam findings.  Optional to fill new glasses prescription, minimal change  Use over the counter artificial tears 2 times a day - Systane Ultra   Return in 1 year for eye exam    Aleena Guaman O.D.  [facility]   [address] [place], [place] [id number] [phone]

## 2019-03-04 NOTE — PROGRESS NOTES
Chief Complaint   Patient presents with     Annual Eye Exam     Pre- Diabetic         Lab Results   Component Value Date    A1C 5.8 06/07/2017    A1C 5.9 05/23/2017       Last Eye Exam: 2/12/2018  Dilated Previously: Yes    What are you currently using to see?  Glasses, wears glasses for distance tasks, including  driving     Distance Vision Acuity: Satisfied with vision, glasses seem to work but she said that her eyes get real tired. She wonders if she should be wearing them more often     Near Vision Acuity: Satisfied with vision while reading and using computer with readers, uses +1.50 or +1.75's.     Eye Comfort: dry and itchy upon waking right more than left   Do you use eye drops? : Yes: Uses Systane about every other day   Occupation or Hobbies:  for [employer]     Brianna Up Optometric Assistant      Medical, surgical and family histories reviewed and updated 3/4/2019.       OBJECTIVE: See Ophthalmology exam    ASSESSMENT:  No diagnosis found.   PLAN:    Lesly miranda  eye exam results will be sent to Janessa Farias.  There are no Patient Instructions on file for this visit.

## 2019-04-09 DIAGNOSIS — F40.298 NEEDLE PHOBIA: ICD-10-CM

## 2019-04-09 DIAGNOSIS — M05.9 SEROPOSITIVE RHEUMATOID ARTHRITIS (H): ICD-10-CM

## 2019-04-29 ENCOUNTER — OFFICE VISIT (OUTPATIENT)
Dept: URGENT CARE | Facility: URGENT CARE | Age: 64
End: 2019-04-29
Payer: COMMERCIAL

## 2019-04-29 VITALS
HEART RATE: 84 BPM | BODY MASS INDEX: 28.34 KG/M2 | SYSTOLIC BLOOD PRESSURE: 137 MMHG | TEMPERATURE: 97.9 F | OXYGEN SATURATION: 99 % | DIASTOLIC BLOOD PRESSURE: 85 MMHG | WEIGHT: 150 LBS

## 2019-04-29 DIAGNOSIS — J06.9 UPPER RESPIRATORY TRACT INFECTION, UNSPECIFIED TYPE: ICD-10-CM

## 2019-04-29 DIAGNOSIS — D84.9 IMMUNOSUPPRESSION (H): ICD-10-CM

## 2019-04-29 DIAGNOSIS — J02.9 SORETHROAT: Primary | ICD-10-CM

## 2019-04-29 LAB
DEPRECATED S PYO AG THROAT QL EIA: NORMAL
SPECIMEN SOURCE: NORMAL

## 2019-04-29 PROCEDURE — 99213 OFFICE O/P EST LOW 20 MIN: CPT | Performed by: NURSE PRACTITIONER

## 2019-04-29 PROCEDURE — 87880 STREP A ASSAY W/OPTIC: CPT | Performed by: NURSE PRACTITIONER

## 2019-04-29 PROCEDURE — 87081 CULTURE SCREEN ONLY: CPT | Performed by: NURSE PRACTITIONER

## 2019-04-29 RX ORDER — AZITHROMYCIN 250 MG/1
TABLET, FILM COATED ORAL
Qty: 6 TABLET | Refills: 0 | Status: SHIPPED | OUTPATIENT
Start: 2019-04-29 | End: 2019-05-04

## 2019-04-29 ASSESSMENT — ENCOUNTER SYMPTOMS
CHILLS: 1
SORE THROAT: 1
WHEEZING: 0
CARDIOVASCULAR NEGATIVE: 1
NEUROLOGICAL NEGATIVE: 1
EYES NEGATIVE: 1
FEVER: 1
MYALGIAS: 1
SPUTUM PRODUCTION: 0
NAUSEA: 1
SHORTNESS OF BREATH: 0
COUGH: 1

## 2019-04-29 ASSESSMENT — PAIN SCALES - GENERAL: PAINLEVEL: MODERATE PAIN (5)

## 2019-04-29 NOTE — NURSING NOTE
"Chief Complaint   Patient presents with     Pharyngitis     C/o sorethroat, headaches and phlemy cough since last thursday. Had fever last night and thinks today as well.       Initial /85   Pulse 84   Temp 97.9  F (36.6  C) (Oral)   Wt 68 kg (150 lb)   SpO2 99%   BMI 28.34 kg/m   Estimated body mass index is 28.34 kg/m  as calculated from the following:    Height as of 7/12/18: 1.549 m (5' 1\").    Weight as of this encounter: 68 kg (150 lb)..  BP completed using cuff size: lucy Hoffmann CMA    "

## 2019-04-30 LAB
BACTERIA SPEC CULT: NORMAL
SPECIMEN SOURCE: NORMAL

## 2019-04-30 NOTE — PROGRESS NOTES
HPI  Lesly Campbell 63 year old presents with CHIEF COMPLAINT of cough, sore throat, myalgias and low-grade fever. She has been ill for 4 days and feels it has worsened today. It is noted she is immunosuppressed due to her arthritis medication. She has not been taking OTC meds for her symptoms.     Past Medical History:   Diagnosis Date     Elevated LFTs      Epigastric abdominal pain      Myalgia      Myositis      Neuropathy      Pharyngitis      Prediabetes      Rheumatoid arthritis (H)       Patient Active Problem List   Diagnosis     Pre-diabetes     Seropositive rheumatoid arthritis (H)     Neuropathy     Elevated LFTs     HCD (health care directive)     Tinnitus, bilateral      Past Surgical History:   Procedure Laterality Date     NO HISTORY OF SURGERY       Allergies   Allergen Reactions     Prednisone      bloating     Sulfa Drugs GI Disturbance     Antibiotics caused GI upset, diarrhea, vomiting, and cramps     Current Outpatient Medications   Medication     acetaminophen (TYLENOL) 325 MG tablet     Atorvastatin Calcium (LIPITOR PO)     azithromycin (ZITHROMAX) 250 MG tablet     Cyanocobalamin (B-12 PO)     Etanercept 50 MG/ML SOCT     gabapentin (NEURONTIN) 100 MG capsule     ibuprofen (ADVIL/MOTRIN) 800 MG tablet     loratadine (CLARITIN) 10 MG tablet     metFORMIN (GLUCOPHAGE-XR) 500 MG 24 hr tablet     montelukast (SINGULAIR) 10 MG tablet     OMEPRAZOLE PO     polyethylene glycol 0.4%- propylene glycol 0.3% (SYSTANE ULTRA) 0.4-0.3 % SOLN ophthalmic solution     sodium chloride (OCEAN) 0.65 % nasal spray     No current facility-administered medications for this visit.        Review of Systems   Constitutional: Positive for chills, fever and malaise/fatigue.   HENT: Positive for sore throat.    Eyes: Negative.    Respiratory: Positive for cough. Negative for sputum production, shortness of breath and wheezing.    Cardiovascular: Negative.    Gastrointestinal: Positive for nausea.   Genitourinary:  Negative.    Musculoskeletal: Positive for myalgias.   Skin: Negative.    Neurological: Negative.    All other systems reviewed and are negative.        Physical Exam   Constitutional: She is oriented to person, place, and time. She appears well-developed and well-nourished. No distress.   /85   Pulse 84   Temp 97.9  F (36.6  C) (Oral)   Wt 68 kg (150 lb)   SpO2 99%   BMI 28.34 kg/m       HENT:   Head: Normocephalic.   Right Ear: External ear normal.   Left Ear: External ear normal.   Mouth/Throat: Oropharynx is clear and moist.   Eyes: Conjunctivae are normal. Right eye exhibits no discharge. Left eye exhibits no discharge.   Cardiovascular: Normal rate, regular rhythm and normal heart sounds.   Pulmonary/Chest: Effort normal and breath sounds normal. No respiratory distress. She has no wheezes.   Abdominal: Soft.   Musculoskeletal: Normal range of motion.   Lymphadenopathy:     She has cervical adenopathy.   Neurological: She is alert and oriented to person, place, and time.   Skin: Skin is warm and dry.   Psychiatric: She has a normal mood and affect.   Nursing note and vitals reviewed.    Assessment:  1. Sorethroat    2. Upper respiratory tract infection, unspecified type    3. Immunosuppression (H)        Plan:  Orders Placed This Encounter     azithromycin (ZITHROMAX) 250 MG tablet   Tylenol 1-2 tabs po q4h prn pain/fever  Robitussin DM for cough  Instructions regarding self-care of patient with URI reviewed.   Written instructions provided in after visit summary and reviewed.  Patient instructed to see primary care provider for new or persistent symptoms.   Red flag symptoms reviewed and patient has been instructed to seek emergent   Care at the closest emergency room for evaluation and treatment.   Please contact pharmacy for medication questions.  Patient instructed to take medications as directed on package.      Magda Childs, APRN, CNP

## 2019-06-02 ENCOUNTER — OFFICE VISIT (OUTPATIENT)
Dept: URGENT CARE | Facility: URGENT CARE | Age: 64
End: 2019-06-02
Payer: COMMERCIAL

## 2019-06-02 VITALS
BODY MASS INDEX: 28.34 KG/M2 | TEMPERATURE: 98.2 F | HEART RATE: 83 BPM | DIASTOLIC BLOOD PRESSURE: 81 MMHG | SYSTOLIC BLOOD PRESSURE: 126 MMHG | OXYGEN SATURATION: 96 % | WEIGHT: 150 LBS

## 2019-06-02 DIAGNOSIS — R30.0 DYSURIA: Primary | ICD-10-CM

## 2019-06-02 LAB
ALBUMIN UR-MCNC: NEGATIVE MG/DL
APPEARANCE UR: CLEAR
BACTERIA #/AREA URNS HPF: ABNORMAL /HPF
BILIRUB UR QL STRIP: NEGATIVE
COLOR UR AUTO: YELLOW
GLUCOSE UR STRIP-MCNC: NEGATIVE MG/DL
HGB UR QL STRIP: NEGATIVE
KETONES UR STRIP-MCNC: NEGATIVE MG/DL
LEUKOCYTE ESTERASE UR QL STRIP: ABNORMAL
NITRATE UR QL: NEGATIVE
NON-SQ EPI CELLS #/AREA URNS LPF: ABNORMAL /LPF
PH UR STRIP: 5.5 PH (ref 5–7)
RBC #/AREA URNS AUTO: ABNORMAL /HPF
SOURCE: ABNORMAL
SP GR UR STRIP: 1.02 (ref 1–1.03)
UROBILINOGEN UR STRIP-ACNC: 0.2 EU/DL (ref 0.2–1)
WBC #/AREA URNS AUTO: ABNORMAL /HPF

## 2019-06-02 PROCEDURE — 81001 URINALYSIS AUTO W/SCOPE: CPT | Performed by: PHYSICIAN ASSISTANT

## 2019-06-02 PROCEDURE — 87088 URINE BACTERIA CULTURE: CPT | Performed by: PHYSICIAN ASSISTANT

## 2019-06-02 PROCEDURE — 87186 SC STD MICRODIL/AGAR DIL: CPT | Performed by: PHYSICIAN ASSISTANT

## 2019-06-02 PROCEDURE — 99213 OFFICE O/P EST LOW 20 MIN: CPT | Performed by: PHYSICIAN ASSISTANT

## 2019-06-02 PROCEDURE — 87086 URINE CULTURE/COLONY COUNT: CPT | Performed by: PHYSICIAN ASSISTANT

## 2019-06-02 RX ORDER — PHENAZOPYRIDINE HYDROCHLORIDE 100 MG/1
100 TABLET, FILM COATED ORAL 3 TIMES DAILY PRN
Qty: 20 TABLET | Refills: 0 | Status: SHIPPED | OUTPATIENT
Start: 2019-06-02 | End: 2019-06-02

## 2019-06-02 RX ORDER — NITROFURANTOIN 25; 75 MG/1; MG/1
100 CAPSULE ORAL 2 TIMES DAILY
Qty: 20 CAPSULE | Refills: 0 | Status: SHIPPED | OUTPATIENT
Start: 2019-06-02 | End: 2019-06-02

## 2019-06-02 RX ORDER — NITROFURANTOIN 25; 75 MG/1; MG/1
100 CAPSULE ORAL 2 TIMES DAILY
Qty: 20 CAPSULE | Refills: 0 | Status: SHIPPED | OUTPATIENT
Start: 2019-06-02

## 2019-06-02 RX ORDER — PHENAZOPYRIDINE HYDROCHLORIDE 100 MG/1
100 TABLET, FILM COATED ORAL 3 TIMES DAILY PRN
Qty: 20 TABLET | Refills: 0 | Status: SHIPPED | OUTPATIENT
Start: 2019-06-02

## 2019-06-02 ASSESSMENT — ENCOUNTER SYMPTOMS
CHILLS: 0
SHORTNESS OF BREATH: 0
HEARTBURN: 0
COUGH: 0
DYSURIA: 1
FREQUENCY: 1
FLANK PAIN: 0
ABDOMINAL PAIN: 0
CONSTIPATION: 0
WHEEZING: 0
FATIGUE: 0
GASTROINTESTINAL NEGATIVE: 1
RESPIRATORY NEGATIVE: 1
VOMITING: 0
CARDIOVASCULAR NEGATIVE: 1
PALPITATIONS: 0
HEMATURIA: 0
CHEST TIGHTNESS: 0
HEMATOCHEZIA: 0
NAUSEA: 0
FEVER: 0
DIARRHEA: 0

## 2019-06-02 NOTE — PROGRESS NOTES
Subjective   Lesly Campbell is a 63 year old female who presents to clinic today for the following health issues:  HPI   URINARY TRACT SYMPTOMS    Duration: 2weeks    Description  dysuria, frequency, urgency, and hesitancy but no hematuria    Intensity:  moderate    Accompanying signs and symptoms:  Fever/chills: no   Flank pain no   Nausea and vomiting: no   Vaginal symptoms: No vaginal d/c, bleeding, rashes and irritation.  No new partners.   Abdominal/Pelvic Pain: No abdominal pain, n/v, constipation, diarrhea, bloody or black tarry stools.  No fever, chills or sweats    History  History of frequent UTI's: no   History of kidney stones: no   Sexually Active: no   Possibility of pregnancy: No    Precipitating or alleviating factors: None    Therapies tried and outcome: increase fluid intake   Outcome: no relief    Patient Active Problem List   Diagnosis     Pre-diabetes     Seropositive rheumatoid arthritis (H)     Neuropathy     Elevated LFTs     HCD (health care directive)     Tinnitus, bilateral     Past Surgical History:   Procedure Laterality Date     NO HISTORY OF SURGERY         Social History     Tobacco Use     Smoking status: Former Smoker     Last attempt to quit:      Years since quittin.4     Smokeless tobacco: Never Used   Substance Use Topics     Alcohol use: Yes     Comment: beer once/week     Family History   Problem Relation Age of Onset     Cirrhosis Mother      Liver Cancer Mother      Glaucoma Maternal Grandmother      Glaucoma Brother      Macular Degeneration No family hx of          Current Outpatient Medications   Medication Sig Dispense Refill     acetaminophen (TYLENOL) 325 MG tablet Take 2 tablets (650 mg) by mouth every 6 hours as needed for mild pain 100 tablet 3     Atorvastatin Calcium (LIPITOR PO)        Cyanocobalamin (B-12 PO)        Etanercept 50 MG/ML SOCT Inject 50 mg Subcutaneous every 7 days Use with AutoTouch. Hold for signs of infection and seek medical  attention. 4 Cartridge 0     gabapentin (NEURONTIN) 100 MG capsule Take 100 mg by mouth 2 times daily as needed       ibuprofen (ADVIL/MOTRIN) 800 MG tablet Take 800 mg by mouth every 8 hours as needed for moderate pain       loratadine (CLARITIN) 10 MG tablet daily        metFORMIN (GLUCOPHAGE-XR) 500 MG 24 hr tablet Take 500 mg by mouth 2 times daily (with meals)        montelukast (SINGULAIR) 10 MG tablet Take 1 tablet by mouth daily  5     OMEPRAZOLE PO Take 20 mg by mouth every morning       polyethylene glycol 0.4%- propylene glycol 0.3% (SYSTANE ULTRA) 0.4-0.3 % SOLN ophthalmic solution Place 1 drop into both eyes 2 times daily 1 Bottle 11     sodium chloride (OCEAN) 0.65 % nasal spray Spray 2 sprays in nostril daily as needed for congestion       Allergies   Allergen Reactions     Prednisone      bloating     Sulfa Drugs GI Disturbance     Antibiotics caused GI upset, diarrhea, vomiting, and cramps       Reviewed and updated as needed this visit by Provider         Review of Systems   Constitutional: Negative for chills, fatigue and fever.   Respiratory: Negative.  Negative for cough, chest tightness, shortness of breath and wheezing.    Cardiovascular: Negative.  Negative for chest pain, palpitations and peripheral edema.   Gastrointestinal: Negative.  Negative for abdominal pain, constipation, diarrhea, heartburn, hematochezia, nausea and vomiting.   Genitourinary: Positive for dysuria, frequency and urgency. Negative for flank pain, hematuria, pelvic pain, vaginal bleeding and vaginal discharge.   All other systems reviewed and are negative.           Objective    /81   Pulse 83   Temp 98.2  F (36.8  C) (Oral)   Wt 68 kg (150 lb)   SpO2 96%   Breastfeeding? No   BMI 28.34 kg/m    Body mass index is 28.34 kg/m .  Physical Exam   Constitutional: She is oriented to person, place, and time. She appears well-developed and well-nourished. No distress.   Cardiovascular: Normal rate, regular rhythm,  normal heart sounds and intact distal pulses. Exam reveals no gallop.   No murmur heard.  Pulmonary/Chest: Effort normal and breath sounds normal. No respiratory distress. She has no wheezes. She has no rales. She exhibits no tenderness.   Abdominal: Soft. Normal appearance and bowel sounds are normal. She exhibits no distension and no mass. There is no hepatosplenomegaly. There is no tenderness. There is no rebound, no guarding, no CVA tenderness, no tenderness at McBurney's point and negative Ford's sign. No hernia.   Neurological: She is alert and oriented to person, place, and time.   Skin: Skin is warm.   Psychiatric: She has a normal mood and affect. Her behavior is normal. Judgment and thought content normal.   Nursing note and vitals reviewed.    Diagnostic Test Results:  Labs reviewed in Epic  Results for orders placed or performed in visit on 06/02/19 (from the past 24 hour(s))   UA reflex to Microscopic and Culture   Result Value Ref Range    Color Urine Yellow     Appearance Urine Clear     Glucose Urine Negative NEG^Negative mg/dL    Bilirubin Urine Negative NEG^Negative    Ketones Urine Negative NEG^Negative mg/dL    Specific Gravity Urine 1.020 1.003 - 1.035    Blood Urine Negative NEG^Negative    pH Urine 5.5 5.0 - 7.0 pH    Protein Albumin Urine Negative NEG^Negative mg/dL    Urobilinogen Urine 0.2 0.2 - 1.0 EU/dL    Nitrite Urine Negative NEG^Negative    Leukocyte Esterase Urine Small (A) NEG^Negative    Source Midstream Urine    Urine Microscopic   Result Value Ref Range    WBC Urine 5-10 (A) OTO5^0 - 5 /HPF    RBC Urine O - 2 OTO2^O - 2 /HPF    Squamous Epithelial /LPF Urine Few FEW^Few /LPF    Bacteria Urine Few (A) NEG^Negative /HPF           Assessment & Plan   Dysuria:  UA and micro is suspicious for UTI and will empirically treat with [id number].  Allergic to sulfa.  Will also give pyridium as needed for urinary pain.  Will send for UC to help guide abx treatment.  Recommend increase  fluids, regular voids, proper wiping techniques and voiding after intercourse.  Educated patient on warning signs of kidney infection and to go to the ER if she develops any of these symptoms.  Recheck in clinic if symptoms worsen or if symptoms do not improve.  -     UA reflex to Microscopic and Culture  -     Urine Microscopic  -     Urine Culture Aerobic Bacterial  -     nitroFURantoin macrocrystal-monohydrate (MACROBID) 100 MG capsule; Take 1 capsule (100 mg) by mouth 2 times daily  -     phenazopyridine (PYRIDIUM) 100 MG tablet; Take 1 tablet (100 mg) by mouth 3 times daily as needed for urinary tract discomfort             Sakina See NARA Bustillo  [facility]

## 2019-06-03 LAB
BACTERIA SPEC CULT: ABNORMAL
SPECIMEN SOURCE: ABNORMAL

## 2019-06-10 DIAGNOSIS — M05.9 SEROPOSITIVE RHEUMATOID ARTHRITIS (H): ICD-10-CM

## 2019-06-10 DIAGNOSIS — F40.298 NEEDLE PHOBIA: ICD-10-CM

## 2019-06-11 NOTE — TELEPHONE ENCOUNTER
Medication/Dose: Etanercept 50 MG/ML SOCT  Sig - Route: Inject 50 mg Subcutaneous every 7 days Use with AutoTouch. Hold for signs of infection and seek medical attention. - Subcutaneous  Last Written Prescription Date: 4/11/19  Last Fill Quantity: 4, # refills: 0  Last Office Visit with Rheumatology Provider:  7/12/18         WBC   Date Value Ref Range Status   11/06/2018 8.1 4.0 - 11.0 10e9/L Final     RBC Count   Date Value Ref Range Status   11/06/2018 4.18 3.8 - 5.2 10e12/L Final     Hemoglobin   Date Value Ref Range Status   11/06/2018 12.1 11.7 - 15.7 g/dL Final     Hematocrit   Date Value Ref Range Status   11/06/2018 37.9 35.0 - 47.0 % Final     MCV   Date Value Ref Range Status   11/06/2018 91 78 - 100 fl Final     MCH   Date Value Ref Range Status   11/06/2018 28.9 26.5 - 33.0 pg Final     MCHC   Date Value Ref Range Status   11/06/2018 31.9 31.5 - 36.5 g/dL Final     RDW   Date Value Ref Range Status   11/06/2018 13.8 10.0 - 15.0 % Final     Platelet Count   Date Value Ref Range Status   11/06/2018 379 150 - 450 10e9/L Final     AST   Date Value Ref Range Status   11/06/2018 68 (H) 0 - 45 U/L Final     ALT   Date Value Ref Range Status   11/06/2018 70 (H) 0 - 50 U/L Final     Creatinine   Date Value Ref Range Status   11/06/2018 0.60 0.52 - 1.04 mg/dL Final     Albumin   Date Value Ref Range Status   11/06/2018 3.8 3.4 - 5.0 g/dL Final     Color Urine (no units)   Date Value   06/02/2019 Yellow     Appearance Urine (no units)   Date Value   06/02/2019 Clear     Glucose Urine (mg/dL)   Date Value   06/02/2019 Negative     Bilirubin Urine (no units)   Date Value   06/02/2019 Negative     Ketones Urine (mg/dL)   Date Value   06/02/2019 Negative     Specific Gravity Urine (no units)   Date Value   06/02/2019 1.020     pH Urine (pH)   Date Value   06/02/2019 5.5     Protein Albumin Urine (mg/dL)   Date Value   06/02/2019 Negative     Urobilinogen Urine (EU/dL)   Date Value   06/02/2019 0.2     Nitrite Urine  (no units)   Date Value   06/02/2019 Negative     Leukocyte Esterase Urine (no units)   Date Value   06/02/2019 Small (A)         Patient has not been seen in Clinic for almost a year. Does not meet refill guidelines. Will send to provider.     RYAN GibsonN, RN   Rheumatology Care Coordinator   [facility]

## 2019-06-26 DIAGNOSIS — M05.9 SEROPOSITIVE RHEUMATOID ARTHRITIS (H): ICD-10-CM

## 2019-06-26 RX ORDER — ATORVASTATIN CALCIUM 10 MG/1
10 TABLET, FILM COATED ORAL DAILY
Qty: 90 TABLET | Refills: 1 | Status: CANCELLED | OUTPATIENT
Start: 2019-06-26

## 2019-08-05 DIAGNOSIS — M05.9 SEROPOSITIVE RHEUMATOID ARTHRITIS (H): Primary | ICD-10-CM

## 2019-08-05 DIAGNOSIS — Z79.899 HIGH RISK MEDICATION USE: ICD-10-CM

## 2019-12-09 ENCOUNTER — HEALTH MAINTENANCE LETTER (OUTPATIENT)
Age: 64
End: 2019-12-09

## 2020-03-15 ENCOUNTER — HEALTH MAINTENANCE LETTER (OUTPATIENT)
Age: 65
End: 2020-03-15

## 2020-10-05 PROBLEM — Z71.89 OTHER SPECIFIED COUNSELING: Status: ACTIVE | Noted: 2018-03-20

## 2021-01-14 ENCOUNTER — HEALTH MAINTENANCE LETTER (OUTPATIENT)
Age: 66
End: 2021-01-14

## 2021-05-08 ENCOUNTER — HEALTH MAINTENANCE LETTER (OUTPATIENT)
Age: 66
End: 2021-05-08

## 2021-10-23 ENCOUNTER — HEALTH MAINTENANCE LETTER (OUTPATIENT)
Age: 66
End: 2021-10-23

## 2022-04-09 ENCOUNTER — HEALTH MAINTENANCE LETTER (OUTPATIENT)
Age: 67
End: 2022-04-09

## 2022-06-04 ENCOUNTER — HEALTH MAINTENANCE LETTER (OUTPATIENT)
Age: 67
End: 2022-06-04

## 2022-10-10 ENCOUNTER — HEALTH MAINTENANCE LETTER (OUTPATIENT)
Age: 67
End: 2022-10-10

## 2023-06-11 ENCOUNTER — HEALTH MAINTENANCE LETTER (OUTPATIENT)
Age: 68
End: 2023-06-11

## 2023-10-06 NOTE — TELEPHONE ENCOUNTER
Diflucan pended for suspected yeast infection.    Medication Appeal Initiation    We have initiated an appeal for the requested medication:  Medication: Enbrel Injectable 50 mg - Appeal initiated  Appeal Start Date:  9/12/2017  Insurance Company: [employer] - Phone [phone] Fax [phone]  Comments: Manually faxed appeal to [facility] at [phone] - Documented that patient is not taking Enbrel in combination with a biologic DMARD. Also faxed chart notes from Office Visit on 9/11/17 along with appeal.

## (undated) RX ORDER — FENTANYL CITRATE 50 UG/ML
INJECTION, SOLUTION INTRAMUSCULAR; INTRAVENOUS
Status: DISPENSED
Start: 2018-04-24

## (undated) RX ORDER — NALOXONE HYDROCHLORIDE 0.4 MG/ML
INJECTION, SOLUTION INTRAMUSCULAR; INTRAVENOUS; SUBCUTANEOUS
Status: DISPENSED
Start: 2018-04-24

## (undated) RX ORDER — FLUMAZENIL 0.1 MG/ML
INJECTION, SOLUTION INTRAVENOUS
Status: DISPENSED
Start: 2018-04-24